# Patient Record
Sex: FEMALE | Race: WHITE | NOT HISPANIC OR LATINO | Employment: UNEMPLOYED | URBAN - METROPOLITAN AREA
[De-identification: names, ages, dates, MRNs, and addresses within clinical notes are randomized per-mention and may not be internally consistent; named-entity substitution may affect disease eponyms.]

---

## 2017-03-09 ENCOUNTER — HISTORICAL (OUTPATIENT)
Dept: LAB | Facility: HOSPITAL | Age: 57
End: 2017-03-09

## 2017-07-07 ENCOUNTER — HISTORICAL (OUTPATIENT)
Dept: RADIOLOGY | Facility: HOSPITAL | Age: 57
End: 2017-07-07

## 2018-07-27 ENCOUNTER — HISTORICAL (OUTPATIENT)
Dept: LAB | Facility: HOSPITAL | Age: 58
End: 2018-07-27

## 2018-07-27 LAB
ALBUMIN SERPL-MCNC: 3.4 GM/DL (ref 3.4–5)
ALBUMIN/GLOB SERPL: 0.9 {RATIO}
ALP SERPL-CCNC: 101 UNIT/L (ref 38–126)
ALT SERPL-CCNC: 30 UNIT/L (ref 12–78)
AST SERPL-CCNC: 16 UNIT/L (ref 15–37)
BILIRUB SERPL-MCNC: 0.4 MG/DL (ref 0.2–1)
BILIRUBIN DIRECT+TOT PNL SERPL-MCNC: 0.1 MG/DL (ref 0–0.2)
BILIRUBIN DIRECT+TOT PNL SERPL-MCNC: 0.3 MG/DL (ref 0–0.8)
BUN SERPL-MCNC: 19 MG/DL (ref 7–18)
CALCIUM SERPL-MCNC: 8.5 MG/DL (ref 8.5–10.1)
CHLORIDE SERPL-SCNC: 110 MMOL/L (ref 98–107)
CHOLEST SERPL-MCNC: 151 MG/DL (ref 0–200)
CHOLEST/HDLC SERPL: 4 {RATIO} (ref 0–4)
CO2 SERPL-SCNC: 28 MMOL/L (ref 21–32)
CREAT SERPL-MCNC: 0.98 MG/DL (ref 0.55–1.02)
DEPRECATED CALCIDIOL+CALCIFEROL SERPL-MC: 19.63 NG/ML (ref 30–80)
GLOBULIN SER-MCNC: 3.8 GM/DL (ref 2.4–3.5)
GLUCOSE SERPL-MCNC: 98 MG/DL (ref 74–106)
HDLC SERPL-MCNC: 40 MG/DL (ref 35–60)
LDLC SERPL CALC-MCNC: 82 MG/DL (ref 50–140)
POTASSIUM SERPL-SCNC: 4.3 MMOL/L (ref 3.5–5.1)
PROT SERPL-MCNC: 7.2 GM/DL (ref 6.4–8.2)
SODIUM SERPL-SCNC: 146 MMOL/L (ref 136–145)
TRIGL SERPL-MCNC: 143 MG/DL (ref 30–150)
TSH SERPL-ACNC: 0.89 MIU/L (ref 0.36–3.74)
VLDLC SERPL CALC-MCNC: 29 MG/DL

## 2018-08-14 ENCOUNTER — HISTORICAL (OUTPATIENT)
Dept: RADIOLOGY | Facility: HOSPITAL | Age: 58
End: 2018-08-14

## 2018-08-22 ENCOUNTER — HISTORICAL (OUTPATIENT)
Dept: RADIOLOGY | Facility: HOSPITAL | Age: 58
End: 2018-08-22

## 2018-10-10 ENCOUNTER — HISTORICAL (OUTPATIENT)
Dept: LAB | Facility: HOSPITAL | Age: 58
End: 2018-10-10

## 2018-10-10 LAB — DEPRECATED CALCIDIOL+CALCIFEROL SERPL-MC: 36 NG/ML (ref 30–100)

## 2019-07-27 ENCOUNTER — HISTORICAL (OUTPATIENT)
Dept: LAB | Facility: HOSPITAL | Age: 59
End: 2019-07-27

## 2019-07-27 LAB
ALBUMIN SERPL-MCNC: 3.4 GM/DL (ref 3.4–5)
ALBUMIN/GLOB SERPL: 1 RATIO (ref 1.1–2)
ALP SERPL-CCNC: 84 UNIT/L (ref 46–116)
ALT SERPL-CCNC: 31 UNIT/L (ref 12–78)
AST SERPL-CCNC: 16 UNIT/L (ref 10–37)
BILIRUB SERPL-MCNC: 0.6 MG/DL (ref 0.2–1)
BILIRUBIN DIRECT+TOT PNL SERPL-MCNC: 0.14 MG/DL (ref 0–0.2)
BILIRUBIN DIRECT+TOT PNL SERPL-MCNC: 0.46 MG/DL
BUN SERPL-MCNC: 19 MG/DL (ref 7–18)
CALCIUM SERPL-MCNC: 8.7 MG/DL (ref 8.5–10.1)
CHLORIDE SERPL-SCNC: 107 MMOL/L (ref 98–107)
CHOLEST SERPL-MCNC: 153 MG/DL (ref 50–200)
CHOLEST/HDLC SERPL: 4 {RATIO} (ref 0–5)
CO2 SERPL-SCNC: 32.5 MMOL/L (ref 21–32)
CREAT SERPL-MCNC: 0.89 MG/DL (ref 0.55–1.02)
DEPRECATED CALCIDIOL+CALCIFEROL SERPL-MC: 52.46 NG/ML (ref 30–80)
GLOBULIN SER-MCNC: 3.5 GM/DL (ref 2.4–3.5)
GLUCOSE SERPL-MCNC: 103 MG/DL (ref 74–106)
HDLC SERPL-MCNC: 42 MG/DL (ref 35–60)
LDLC SERPL CALC-MCNC: 83 MG/DL (ref 50–140)
POTASSIUM SERPL-SCNC: 3.5 MMOL/L (ref 3.5–5.1)
PROT SERPL-MCNC: 6.9 GM/DL (ref 6.4–8.2)
SODIUM SERPL-SCNC: 144 MMOL/L (ref 136–145)
TRIGL SERPL-MCNC: 138 MG/DL (ref 30–150)
TSH SERPL-ACNC: 0.75 MIU/ML (ref 0.35–3.75)
VLDLC SERPL CALC-MCNC: 28 MG/DL

## 2019-12-09 LAB — CRC RECOMMENDATION EXT: NORMAL

## 2020-07-21 ENCOUNTER — HISTORICAL (OUTPATIENT)
Dept: RADIOLOGY | Facility: HOSPITAL | Age: 60
End: 2020-07-21

## 2020-07-21 LAB
ABS NEUT (OLG): 3
ALBUMIN SERPL-MCNC: 2.9 GM/DL (ref 3.4–4.8)
ALBUMIN/GLOB SERPL: 0.7 RATIO (ref 1.1–2)
ALP SERPL-CCNC: 64 UNIT/L (ref 40–150)
ALT SERPL-CCNC: 25 UNIT/L (ref 0–55)
AST SERPL-CCNC: 34 UNIT/L (ref 5–34)
BASOPHILS # BLD AUTO: 0 X10(3)/MCL
BASOPHILS NFR BLD AUTO: 0 %
BILIRUB SERPL-MCNC: 0.6 MG/DL
BILIRUBIN DIRECT+TOT PNL SERPL-MCNC: 0.3 MG/DL
BILIRUBIN DIRECT+TOT PNL SERPL-MCNC: 0.3 MG/DL (ref 0–0.5)
BUN SERPL-MCNC: 13 MG/DL (ref 9.8–20.1)
CALCIUM SERPL-MCNC: 8.7 MG/DL (ref 8.4–10.2)
CHLORIDE SERPL-SCNC: 104 MMOL/L (ref 98–107)
CO2 SERPL-SCNC: 27 MEQ/L (ref 23–31)
CREAT SERPL-MCNC: 0.81 MG/DL (ref 0.55–1.02)
EOSINOPHIL # BLD AUTO: 0 X10(3)/MCL
EOSINOPHIL NFR BLD AUTO: 0 %
ERYTHROCYTE [DISTWIDTH] IN BLOOD BY AUTOMATED COUNT: 17 %
FERRITIN SERPL-MCNC: 454.82 NG/ML (ref 4.63–204)
GLOBULIN SER-MCNC: 3.9 GM/DL (ref 2.4–3.5)
GLUCOSE SERPL-MCNC: 116 MG/DL (ref 82–115)
HCT VFR BLD AUTO: 39.3 % (ref 34–46)
HGB BLD-MCNC: 12 GM/DL (ref 11.3–15.4)
IMM GRANULOCYTES # BLD AUTO: 0.01 10*3/UL (ref 0–0.1)
IMM GRANULOCYTES NFR BLD AUTO: 0.2 % (ref 0–1)
LYMPHOCYTES # BLD AUTO: 0.77 X10(3)/MCL
LYMPHOCYTES NFR BLD AUTO: 18.4 %
MCH RBC QN AUTO: 26.5 PG (ref 27–33)
MCHC RBC AUTO-ENTMCNC: 30.5 GM/DL (ref 32–35)
MCV RBC AUTO: 86.8 FL (ref 81–97)
MONOCYTES # BLD AUTO: 0.41 X10(3)/MCL
MONOCYTES NFR BLD AUTO: 9.8 %
NEUTROPHILS # BLD AUTO: 3 X10(3)/MCL
NEUTROPHILS NFR BLD AUTO: 71.6 %
PLATELET # BLD AUTO: 199 X10(3)/MCL (ref 140–450)
PMV BLD AUTO: 11 FL
POTASSIUM SERPL-SCNC: 3.7 MMOL/L (ref 3.5–5.1)
PROT SERPL-MCNC: 6.8 GM/DL (ref 5.8–7.6)
RBC # BLD AUTO: 4.53 X10(6)/MCL (ref 3.9–5)
SODIUM SERPL-SCNC: 142 MMOL/L (ref 136–145)
WBC # SPEC AUTO: 4.19 X10(3)/MCL (ref 3.4–9.2)

## 2020-11-04 ENCOUNTER — HISTORICAL (OUTPATIENT)
Dept: LAB | Facility: HOSPITAL | Age: 60
End: 2020-11-04

## 2020-11-04 LAB
ALBUMIN SERPL-MCNC: 3.8 GM/DL (ref 3.4–4.8)
ALBUMIN/GLOB SERPL: 1.2 RATIO (ref 1.1–2)
ALP SERPL-CCNC: 85 UNIT/L (ref 40–150)
ALT SERPL-CCNC: 20 UNIT/L (ref 0–55)
AST SERPL-CCNC: 21 UNIT/L (ref 5–34)
BILIRUB SERPL-MCNC: 0.5 MG/DL
BILIRUBIN DIRECT+TOT PNL SERPL-MCNC: 0.2 MG/DL (ref 0–0.5)
BILIRUBIN DIRECT+TOT PNL SERPL-MCNC: 0.3 MG/DL
BUN SERPL-MCNC: 16 MG/DL (ref 9.8–20.1)
CALCIUM SERPL-MCNC: 9.2 MG/DL (ref 8.4–10.2)
CHLORIDE SERPL-SCNC: 103 MMOL/L (ref 98–107)
CHOLEST SERPL-MCNC: 137 MG/DL
CHOLEST/HDLC SERPL: 3 {RATIO} (ref 0–5)
CO2 SERPL-SCNC: 33 MEQ/L (ref 23–31)
CREAT SERPL-MCNC: 0.76 MG/DL (ref 0.55–1.02)
DEPRECATED CALCIDIOL+CALCIFEROL SERPL-MC: 54.7 NG/ML (ref 6.6–49.9)
GLOBULIN SER-MCNC: 3.1 GM/DL (ref 2.4–3.5)
GLUCOSE SERPL-MCNC: 118 MG/DL (ref 82–115)
HDLC SERPL-MCNC: 40 MG/DL (ref 35–60)
LDLC SERPL CALC-MCNC: 68 MG/DL (ref 50–140)
POTASSIUM SERPL-SCNC: 3.3 MMOL/L (ref 3.5–5.1)
PROT SERPL-MCNC: 6.9 GM/DL (ref 5.8–7.6)
SODIUM SERPL-SCNC: 147 MMOL/L (ref 136–145)
TRIGL SERPL-MCNC: 146 MG/DL (ref 37–140)
VLDLC SERPL CALC-MCNC: 29 MG/DL

## 2021-01-06 ENCOUNTER — HISTORICAL (OUTPATIENT)
Dept: LAB | Facility: HOSPITAL | Age: 61
End: 2021-01-06

## 2021-01-06 LAB
BUN SERPL-MCNC: 16 MG/DL (ref 9.8–20.1)
CALCIUM SERPL-MCNC: 9.4 MG/DL (ref 8.4–10.2)
CHLORIDE SERPL-SCNC: 107 MMOL/L (ref 98–107)
CO2 SERPL-SCNC: 30 MEQ/L (ref 23–31)
CREAT SERPL-MCNC: 0.83 MG/DL (ref 0.55–1.02)
CREAT/UREA NIT SERPL: 19
GLUCOSE SERPL-MCNC: 107 MG/DL (ref 82–115)
POTASSIUM SERPL-SCNC: 3.7 MMOL/L (ref 3.5–5.1)
SODIUM SERPL-SCNC: 146 MMOL/L (ref 136–145)

## 2021-04-09 ENCOUNTER — HISTORICAL (OUTPATIENT)
Dept: RADIOLOGY | Facility: HOSPITAL | Age: 61
End: 2021-04-09

## 2021-05-18 ENCOUNTER — HISTORICAL (OUTPATIENT)
Dept: ANESTHESIOLOGY | Facility: HOSPITAL | Age: 61
End: 2021-05-18

## 2021-06-10 ENCOUNTER — HISTORICAL (OUTPATIENT)
Dept: RADIOLOGY | Facility: HOSPITAL | Age: 61
End: 2021-06-10

## 2021-06-15 ENCOUNTER — HISTORICAL (OUTPATIENT)
Dept: ANESTHESIOLOGY | Facility: HOSPITAL | Age: 61
End: 2021-06-15

## 2021-06-29 ENCOUNTER — HISTORICAL (OUTPATIENT)
Dept: ANESTHESIOLOGY | Facility: HOSPITAL | Age: 61
End: 2021-06-29

## 2021-07-13 ENCOUNTER — HISTORICAL (OUTPATIENT)
Dept: ANESTHESIOLOGY | Facility: HOSPITAL | Age: 61
End: 2021-07-13

## 2021-11-01 ENCOUNTER — HISTORICAL (OUTPATIENT)
Dept: LAB | Facility: HOSPITAL | Age: 61
End: 2021-11-01

## 2021-11-01 LAB
ABS NEUT (OLG): 2.31
ALBUMIN SERPL-MCNC: 3.7 GM/DL (ref 3.4–4.8)
ALBUMIN/GLOB SERPL: 1.2 RATIO (ref 1.1–2)
ALP SERPL-CCNC: 76 UNIT/L (ref 40–150)
ALT SERPL-CCNC: 25 UNIT/L (ref 0–55)
AST SERPL-CCNC: 19 UNIT/L (ref 5–34)
BASOPHILS # BLD AUTO: 0.01 X10(3)/MCL
BASOPHILS NFR BLD AUTO: 0.2 %
BILIRUB SERPL-MCNC: 0.6 MG/DL
BILIRUBIN DIRECT+TOT PNL SERPL-MCNC: 0.3 MG/DL
BILIRUBIN DIRECT+TOT PNL SERPL-MCNC: 0.3 MG/DL (ref 0–0.5)
BUN SERPL-MCNC: 17 MG/DL (ref 9.8–20.1)
CALCIUM SERPL-MCNC: 9.5 MG/DL (ref 8.7–10.5)
CHLORIDE SERPL-SCNC: 104 MMOL/L (ref 98–107)
CHOLEST SERPL-MCNC: 120 MG/DL
CHOLEST/HDLC SERPL: 3 {RATIO} (ref 0–5)
CO2 SERPL-SCNC: 30 MEQ/L (ref 23–31)
CREAT SERPL-MCNC: 0.78 MG/DL (ref 0.55–1.02)
DEPRECATED CALCIDIOL+CALCIFEROL SERPL-MC: 74.4 NG/ML (ref 30–80)
EOSINOPHIL # BLD AUTO: 0.16 X10(3)/MCL
EOSINOPHIL NFR BLD AUTO: 3.8 %
ERYTHROCYTE [DISTWIDTH] IN BLOOD BY AUTOMATED COUNT: 16 %
GLOBULIN SER-MCNC: 3.2 GM/DL (ref 2.4–3.5)
GLUCOSE SERPL-MCNC: 106 MG/DL (ref 82–115)
HCT VFR BLD AUTO: 42.2 % (ref 34–46)
HDLC SERPL-MCNC: 35 MG/DL (ref 35–60)
HGB BLD-MCNC: 13 GM/DL (ref 11.3–15.4)
IMM GRANULOCYTES # BLD AUTO: 0.01 10*3/UL (ref 0–0.1)
IMM GRANULOCYTES NFR BLD AUTO: 0.2 % (ref 0–1)
LDLC SERPL CALC-MCNC: 58 MG/DL (ref 50–140)
LYMPHOCYTES # BLD AUTO: 1.1 X10(3)/MCL
LYMPHOCYTES NFR BLD AUTO: 26.4 %
MCH RBC QN AUTO: 28.6 PG (ref 27–33)
MCHC RBC AUTO-ENTMCNC: 30.8 GM/DL (ref 32–35)
MCV RBC AUTO: 93 FL (ref 81–97)
MONOCYTES # BLD AUTO: 0.57 X10(3)/MCL
MONOCYTES NFR BLD AUTO: 13.7 %
NEUTROPHILS # BLD AUTO: 2.31 X10(3)/MCL
NEUTROPHILS NFR BLD AUTO: 55.7 %
PLATELET # BLD AUTO: 228 X10(3)/MCL (ref 140–450)
PMV BLD AUTO: 11 FL
POTASSIUM SERPL-SCNC: 3.5 MMOL/L (ref 3.5–5.1)
PROT SERPL-MCNC: 6.9 GM/DL (ref 5.8–7.6)
RBC # BLD AUTO: 4.54 X10(6)/MCL (ref 3.9–5)
SODIUM SERPL-SCNC: 145 MMOL/L (ref 136–145)
TRIGL SERPL-MCNC: 135 MG/DL (ref 37–140)
TSH SERPL-ACNC: 0.66 UIU/ML (ref 0.35–4.94)
VLDLC SERPL CALC-MCNC: 27 MG/DL
WBC # SPEC AUTO: 4.16 X10(3)/MCL (ref 3.4–9.2)

## 2021-12-08 ENCOUNTER — HISTORICAL (OUTPATIENT)
Dept: RADIOLOGY | Facility: HOSPITAL | Age: 61
End: 2021-12-08

## 2022-02-25 ENCOUNTER — HISTORICAL (OUTPATIENT)
Dept: ANESTHESIOLOGY | Facility: HOSPITAL | Age: 62
End: 2022-02-25

## 2022-03-09 ENCOUNTER — HISTORICAL (OUTPATIENT)
Dept: ADMINISTRATIVE | Facility: HOSPITAL | Age: 62
End: 2022-03-09

## 2022-03-09 ENCOUNTER — HISTORICAL (OUTPATIENT)
Dept: RADIOLOGY | Facility: HOSPITAL | Age: 62
End: 2022-03-09

## 2022-04-27 LAB — CRC RECOMMENDATION EXT: NORMAL

## 2022-09-02 RX ORDER — FUROSEMIDE 40 MG/1
40 TABLET ORAL 2 TIMES DAILY
COMMUNITY
Start: 2021-05-12 | End: 2023-05-02

## 2022-09-02 RX ORDER — PREGABALIN 150 MG/1
150 CAPSULE ORAL 2 TIMES DAILY
COMMUNITY
Start: 2021-05-12

## 2022-09-02 RX ORDER — CETIRIZINE HYDROCHLORIDE 10 MG/1
10 TABLET ORAL NIGHTLY
COMMUNITY
Start: 2021-05-12

## 2022-09-02 RX ORDER — SIMVASTATIN 20 MG/1
20 TABLET, FILM COATED ORAL NIGHTLY
COMMUNITY
Start: 2021-05-12 | End: 2023-05-02

## 2022-09-02 RX ORDER — CYCLOBENZAPRINE HCL 10 MG
10 TABLET ORAL 3 TIMES DAILY PRN
COMMUNITY
Start: 2021-05-12 | End: 2023-05-03

## 2022-09-02 RX ORDER — PRAMIPEXOLE DIHYDROCHLORIDE 0.12 MG/1
0.12 TABLET ORAL NIGHTLY
COMMUNITY
Start: 2021-05-12 | End: 2023-05-02

## 2022-09-02 RX ORDER — LOSARTAN POTASSIUM AND HYDROCHLOROTHIAZIDE 12.5; 5 MG/1; MG/1
1 TABLET ORAL EVERY MORNING
COMMUNITY
Start: 2021-05-12 | End: 2023-03-06 | Stop reason: SDUPTHER

## 2022-09-02 RX ORDER — HYDROCODONE BITARTRATE AND ACETAMINOPHEN 5; 325 MG/1; MG/1
1 TABLET ORAL 2 TIMES DAILY PRN
COMMUNITY
Start: 2021-05-12 | End: 2022-11-22

## 2022-09-02 RX ORDER — DULOXETIN HYDROCHLORIDE 30 MG/1
60 CAPSULE, DELAYED RELEASE ORAL EVERY MORNING
COMMUNITY
Start: 2021-05-12 | End: 2023-07-10

## 2022-09-02 RX ORDER — POLYETHYLENE GLYCOL 3350 17 G/17G
17 POWDER, FOR SOLUTION ORAL NIGHTLY
COMMUNITY

## 2022-09-02 RX ORDER — POTASSIUM CHLORIDE 20 MEQ/1
20 TABLET, EXTENDED RELEASE ORAL 2 TIMES DAILY
COMMUNITY
Start: 2021-05-12 | End: 2023-04-04 | Stop reason: SDUPTHER

## 2022-09-07 ENCOUNTER — ANESTHESIA EVENT (OUTPATIENT)
Dept: SURGERY | Facility: HOSPITAL | Age: 62
End: 2022-09-07
Payer: MEDICARE

## 2022-09-07 NOTE — ANESTHESIA PREPROCEDURE EVALUATION
09/07/2022  Kimberly Agarwal is a 62 y.o., female.      Pre-op Assessment    I have reviewed the Patient Summary Reports.     I have reviewed the Nursing Notes. I have reviewed the NPO Status.   I have reviewed the Medications.     Review of Systems  Anesthesia Hx:  Denies Family Hx of Anesthesia complications.   Denies Personal Hx of Anesthesia complications.   Social:  Non-Smoker, No Alcohol Use    Hematology/Oncology:  Hematology Normal   Oncology Normal     EENT/Dental:EENT/Dental Normal   Cardiovascular:   Hypertension, well controlled    Pulmonary:   Sleep Apnea    Hepatic/GI:   GERD, well controlled    Musculoskeletal:   Arthritis     Neurological:   Neuromuscular Disease,    Endocrine:  Endocrine Normal    Dermatological:  Skin Normal    Psych:  Psychiatric Normal           Physical Exam  General: Cooperative, Alert and Oriented    Airway:  Mallampati: II   Mouth Opening: Normal  Tongue: Normal  Neck ROM: Normal ROM    Dental:  Intact        Anesthesia Plan  Type of Anesthesia, risks & benefits discussed:    Anesthesia Type: MAC  Intra-op Monitoring Plan: Standard ASA Monitors  Post Op Pain Control Plan: multimodal analgesia  Induction:  IV  Informed Consent: Informed consent signed with the Patient and all parties understand the risks and agree with anesthesia plan.  All questions answered. Patient consented to blood products? Yes  ASA Score: 3    Ready For Surgery From Anesthesia Perspective.     .

## 2022-09-09 ENCOUNTER — HOSPITAL ENCOUNTER (OUTPATIENT)
Facility: HOSPITAL | Age: 62
Discharge: HOME | End: 2022-09-09
Attending: ANESTHESIOLOGY | Admitting: ANESTHESIOLOGY
Payer: MEDICARE

## 2022-09-09 ENCOUNTER — ANESTHESIA (OUTPATIENT)
Dept: SURGERY | Facility: HOSPITAL | Age: 62
End: 2022-09-09
Payer: MEDICARE

## 2022-09-09 DIAGNOSIS — M47.816 LUMBAR SPONDYLOSIS: ICD-10-CM

## 2022-09-09 PROCEDURE — 25000003 PHARM REV CODE 250: Performed by: ANESTHESIOLOGY

## 2022-09-09 PROCEDURE — 64636 DESTROY L/S FACET JNT ADDL: CPT | Mod: 50 | Performed by: ANESTHESIOLOGY

## 2022-09-09 PROCEDURE — 64635 DESTROY LUMB/SAC FACET JNT: CPT | Mod: 50 | Performed by: ANESTHESIOLOGY

## 2022-09-09 PROCEDURE — 63600175 PHARM REV CODE 636 W HCPCS: Performed by: NURSE ANESTHETIST, CERTIFIED REGISTERED

## 2022-09-09 PROCEDURE — 63600175 PHARM REV CODE 636 W HCPCS: Performed by: ANESTHESIOLOGY

## 2022-09-09 PROCEDURE — 37000009 HC ANESTHESIA EA ADD 15 MINS: Performed by: ANESTHESIOLOGY

## 2022-09-09 PROCEDURE — 25000003 PHARM REV CODE 250

## 2022-09-09 PROCEDURE — 37000008 HC ANESTHESIA 1ST 15 MINUTES: Performed by: ANESTHESIOLOGY

## 2022-09-09 RX ORDER — FENTANYL CITRATE 50 UG/ML
INJECTION, SOLUTION INTRAMUSCULAR; INTRAVENOUS
Status: DISCONTINUED | OUTPATIENT
Start: 2022-09-09 | End: 2022-09-09

## 2022-09-09 RX ORDER — LIDOCAINE HYDROCHLORIDE 20 MG/ML
INJECTION, SOLUTION EPIDURAL; INFILTRATION; INTRACAUDAL; PERINEURAL
Status: DISCONTINUED | OUTPATIENT
Start: 2022-09-09 | End: 2022-09-09 | Stop reason: HOSPADM

## 2022-09-09 RX ORDER — MIDAZOLAM HYDROCHLORIDE 1 MG/ML
INJECTION INTRAMUSCULAR; INTRAVENOUS
Status: DISCONTINUED | OUTPATIENT
Start: 2022-09-09 | End: 2022-09-09

## 2022-09-09 RX ORDER — BUPIVACAINE HYDROCHLORIDE 2.5 MG/ML
INJECTION, SOLUTION EPIDURAL; INFILTRATION; INTRACAUDAL
Status: DISCONTINUED | OUTPATIENT
Start: 2022-09-09 | End: 2022-09-09 | Stop reason: HOSPADM

## 2022-09-09 RX ORDER — SODIUM CHLORIDE, SODIUM LACTATE, POTASSIUM CHLORIDE, CALCIUM CHLORIDE 600; 310; 30; 20 MG/100ML; MG/100ML; MG/100ML; MG/100ML
INJECTION, SOLUTION INTRAVENOUS CONTINUOUS
Status: ACTIVE | OUTPATIENT
Start: 2022-09-09

## 2022-09-09 RX ADMIN — FENTANYL CITRATE 100 MCG: 50 INJECTION, SOLUTION INTRAMUSCULAR; INTRAVENOUS at 11:09

## 2022-09-09 RX ADMIN — MIDAZOLAM HYDROCHLORIDE 2 MG: 1 INJECTION, SOLUTION INTRAMUSCULAR; INTRAVENOUS at 11:09

## 2022-09-09 RX ADMIN — SODIUM CHLORIDE, POTASSIUM CHLORIDE, SODIUM LACTATE AND CALCIUM CHLORIDE: 600; 310; 30; 20 INJECTION, SOLUTION INTRAVENOUS at 09:09

## 2022-09-09 NOTE — ANESTHESIA POSTPROCEDURE EVALUATION
Anesthesia Post Evaluation    Patient: Kimberly Agarwal    Procedure(s) Performed: Procedure(s) (LRB):  RADIOFREQUENCY ABLATION, NERVE, SPINAL, LUMBAR, MEDIAL BRANCH, 1 LEVEL / Bilateral L3-5 RFA (Bilateral)    Final Anesthesia Type: MAC      Patient location during evaluation: OPS  Patient participation: Yes- Able to Participate  Level of consciousness: awake and alert  Post-procedure vital signs: reviewed and stable  Pain management: adequate  Airway patency: patent  NIKO mitigation strategies: Multimodal analgesia  PONV status at discharge: No PONV  Anesthetic complications: no      Cardiovascular status: hemodynamically stable  Respiratory status: unassisted, spontaneous ventilation and room air  Hydration status: euvolemic  Follow-up not needed.  Comments: Patient to bed per self          Vitals Value Taken Time   /84 09/09/22 0926   Temp  09/09/22 1126   Pulse 71 09/09/22 0926   Resp  09/09/22 1126   SpO2 97 % 09/09/22 0926         No case tracking events are documented in the log.      Pain/Raimundo Score: No data recorded

## 2022-09-09 NOTE — DISCHARGE SUMMARY
[facility] - Periop Services  Discharge Note  Short Stay    Procedure(s) (LRB):  RADIOFREQUENCY ABLATION, NERVE, SPINAL, LUMBAR, MEDIAL BRANCH, 1 LEVEL / Bilateral L3-5 RFA (Bilateral)    OUTCOME: Patient tolerated treatment/procedure well without complication and is now ready for discharge.    DISPOSITION: Home or Self Care    FINAL DIAGNOSIS:  Lumbar spondylosis    FOLLOWUP: In clinic    DISCHARGE INSTRUCTIONS:  No discharge procedures on file.      Clinical Reference Documents Added to Patient Instructions         Document    RADIOFREQUENCY ABLATION FOR PAIN (ENGLISH)            TIME SPENT ON DISCHARGE: 3 minutes

## 2022-09-12 VITALS
HEIGHT: 62 IN | HEART RATE: 69 BPM | BODY MASS INDEX: 53.92 KG/M2 | WEIGHT: 293 LBS | SYSTOLIC BLOOD PRESSURE: 162 MMHG | DIASTOLIC BLOOD PRESSURE: 73 MMHG | OXYGEN SATURATION: 97 %

## 2022-09-14 NOTE — PROCEDURES
Patient:   Kimberly Agarwal            MRN: [id number]            FIN: [id number]               Age:   61 years     Sex:  Female     :  1960   Associated Diagnoses:   None   Author:   Racquel VALVERDE MD, Jake      Type of Procedure: Lumbar Medial Branch Block and Conventional Radiofrequency Ablation    Physician: Phillip Clement III, MD    Diagnosis: Lumbar spondylosis without myelopathy    Description of Procedure:  After appropriate informed consent discussing the risks, benefits and possible complications, the patient was taken to the procedure suite. NIBP, pulse rate, and oxygen saturation were monitored throughout the procedure.     Patient was placed in the prone position with the midriff elevated. The skin was prepped and draped in a sterile fashion. AP and oblique views of the spine were obtained with fluoroscopy. Entry sites were marked over the skin and 2cc of Lidocaine 1% was used to anesthetize the skin and subcutaneous tissues at each entry site. A 18-gauge 10cm curved tip, insulated, RF needle was introduced at an angle to parallel the medial branches in the groove between superior articular process and transverse process. Needles were placed at the junction of the superior articular process and the transverse process. Additionally, needles were placed at the junction of the S1 superior process and the sacral ala. Procedure performed at the levels indicated: Bilateral L3-5 MB    No sensory stimulation was performed. No motor stimulation was elicited at any level at 2V with a frequency of 2Hz. 1cc of 2% lidocaine was injected at each level. Thermal RF was then conducted at each level at 80 degrees for 1:30 minutes. 1 cc of 0.25% bupivicaine was injected at each level. The needles were re-styletted and removed.    The needle was re-styletted and removed. Adhesive dressing was applied over the site. Patient tolerated the procedure well without any apparent complications. The patient was  transferred back to the recovery area and then discharged home.

## 2022-10-22 ENCOUNTER — HOSPITAL ENCOUNTER (EMERGENCY)
Facility: HOSPITAL | Age: 62
Discharge: HOME | End: 2022-10-22
Attending: EMERGENCY MEDICINE
Payer: MEDICARE

## 2022-10-22 VITALS
WEIGHT: 293 LBS | BODY MASS INDEX: 53.92 KG/M2 | DIASTOLIC BLOOD PRESSURE: 93 MMHG | HEIGHT: 62 IN | RESPIRATION RATE: 16 BRPM | OXYGEN SATURATION: 96 % | HEART RATE: 84 BPM | TEMPERATURE: 98 F | SYSTOLIC BLOOD PRESSURE: 174 MMHG

## 2022-10-22 DIAGNOSIS — S81.811A LACERATION OF RIGHT LOWER EXTREMITY, INITIAL ENCOUNTER: Primary | ICD-10-CM
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
## 2022-10-22 PROCEDURE — 25000003 PHARM REV CODE 250: Performed by: EMERGENCY MEDICINE

## 2022-10-22 PROCEDURE — 96372 THER/PROPH/DIAG INJ SC/IM: CPT | Performed by: EMERGENCY MEDICINE

## 2022-10-22 PROCEDURE — 12002 RPR S/N/AX/GEN/TRNK2.6-7.5CM: CPT | Mod: RT

## 2022-10-22 PROCEDURE — 99284 EMERGENCY DEPT VISIT MOD MDM: CPT | Mod: 25

## 2022-10-22 PROCEDURE — 63600175 PHARM REV CODE 636 W HCPCS: Performed by: EMERGENCY MEDICINE

## 2022-10-22 PROCEDURE — 90471 IMMUNIZATION ADMIN: CPT | Performed by: EMERGENCY MEDICINE

## 2022-10-22 PROCEDURE — 90715 TDAP VACCINE 7 YRS/> IM: CPT | Performed by: EMERGENCY MEDICINE

## 2022-10-22 RX ORDER — CEFAZOLIN SODIUM 1 G/3ML
1 INJECTION, POWDER, FOR SOLUTION INTRAMUSCULAR; INTRAVENOUS
Status: COMPLETED | OUTPATIENT
Start: 2022-10-22 | End: 2022-10-22

## 2022-10-22 RX ORDER — LIDOCAINE HYDROCHLORIDE 10 MG/ML
10 INJECTION INFILTRATION; PERINEURAL
Status: COMPLETED | OUTPATIENT
Start: 2022-10-22 | End: 2022-10-22

## 2022-10-22 RX ORDER — CEPHALEXIN 500 MG/1
500 CAPSULE ORAL 4 TIMES DAILY
Qty: 28 CAPSULE | Refills: 0 | Status: SHIPPED | OUTPATIENT
Start: 2022-10-22 | End: 2022-10-29

## 2022-10-22 RX ADMIN — CEFAZOLIN 1 G: 330 INJECTION, POWDER, FOR SOLUTION INTRAMUSCULAR; INTRAVENOUS at 05:10

## 2022-10-22 RX ADMIN — TETANUS TOXOID, REDUCED DIPHTHERIA TOXOID AND ACELLULAR PERTUSSIS VACCINE, ADSORBED 0.5 ML: 5; 2.5; 8; 8; 2.5 SUSPENSION INTRAMUSCULAR at 05:10

## 2022-10-22 RX ADMIN — LIDOCAINE HYDROCHLORIDE 10 ML: 10 INJECTION, SOLUTION INFILTRATION; PERINEURAL at 05:10

## 2022-10-22 NOTE — ED NOTES
Pt to Ed with c/o lac to lower right leg after tripping over a PVC pipe. Pressure dressing applied PTA, pt able to able to ambulate to room with no assitance. Pulses intact.

## 2022-10-22 NOTE — ED PROVIDER NOTES
Encounter Date: 10/22/2022       History     Chief Complaint   Patient presents with    Laceration     Laceration to the right lower leg 20 min PTA.  States fell on a PVC pipe.       Patient is a 62-year-old female presenting with complaint of laceration to the right lower extremity.  Patient states she was working in [employer] when she slipped and fell and cut her right lower extremity on a piece of pipe that was sticking on the ground.  Pipe was plastic.  Patient's tetanus shot is not up-to-date.  Patient denies any allergies to antibiotics.  Patient denies head trauma.  No neck pain.  No syncopal type symptoms.    Review of patient's allergies indicates:   Allergen Reactions    Latex Rash    Adhesive tape-silicones Blisters     Past Medical History:   Diagnosis Date    GERD (gastroesophageal reflux disease)     High cholesterol     HTN (hypertension)     Other spondylosis with radiculopathy, lumbar region     RLS (restless legs syndrome)     Sleep apnea     Sleep disorder      Past Surgical History:   Procedure Laterality Date    APPENDECTOMY      BACK SURGERY      COLONOSCOPY      ESOPHAGOGASTRODUODENOSCOPY      OPEN REDUCTION AND INTERNAL FIXATION (ORIF) OF INJURY OF WRIST Right     RADIOFREQUENCY ABLATION OF LUMBAR MEDIAL BRANCH NERVE AT SINGLE LEVEL Bilateral 9/9/2022    Procedure: RADIOFREQUENCY ABLATION, NERVE, SPINAL, LUMBAR, MEDIAL BRANCH, 1 LEVEL / Bilateral L3-5 RFA;  Surgeon: Phillip Clement MD;  Location: [facility];  Service: Pain Management;  Laterality: Bilateral;    TONSILLECTOMY      TOTAL KNEE ARTHROPLASTY Left      Family History   Problem Relation Age of Onset    Hypertension Mother     Hyperlipidemia Mother     Stroke Mother     Hypertension Father     Diabetes Father     Hyperlipidemia Father      Social History     Tobacco Use    Smoking status: Never    Smokeless tobacco: Never   Substance Use Topics    Alcohol use: Not Currently    Drug use: Never     Review of Systems   Constitutional: Negative.     HENT: Negative.     Respiratory: Negative.     Cardiovascular: Negative.    Gastrointestinal: Negative.    Musculoskeletal:  Positive for myalgias. Negative for arthralgias, gait problem, joint swelling and neck pain.   Skin:  Positive for wound.   Neurological: Negative.    Psychiatric/Behavioral: Negative.       Physical Exam     Initial Vitals [10/22/22 1708]   BP Pulse Resp Temp SpO2   (!) 197/95 (!) 120 18 98.4 °F (36.9 °C) 95 %      MAP       --         Physical Exam    Nursing note and vitals reviewed.  Constitutional: She appears well-developed and well-nourished.   HENT:   Head: Normocephalic and atraumatic.   Eyes: Pupils are equal, round, and reactive to light.   Neck:   Normal range of motion.  Cardiovascular:  Normal rate, regular rhythm and normal heart sounds.           Pulmonary/Chest: Breath sounds normal.   Abdominal: Abdomen is soft. She exhibits no distension. There is no abdominal tenderness. There is no rebound.   Musculoskeletal:         General: Normal range of motion.      Cervical back: Normal range of motion.     Neurological: She is alert and oriented to person, place, and time. She has normal strength.   Skin:   Patient has an L-shaped laceration to the anterior aspect of the right mid tib-fib area.  There is mild bleeding from the area.  No foreign body is visible.  Laceration is approximately 5 cm in length.       ED Course   Lac Repair    Date/Time: 10/22/2022 5:41 PM  Performed by: Kory Bernard MD  Authorized by: Kory Bernard MD     Consent:     Consent obtained:  Verbal  Universal protocol:     Patient identity confirmed:  Verbally with patient  Anesthesia:     Anesthesia method:  Local infiltration    Local anesthetic:  Lidocaine 1% w/o epi  Laceration details:     Location:  Leg    Leg location:  R lower leg    Length (cm):  5  Pre-procedure details:     Preparation:  Patient was prepped and draped in usual sterile fashion and imaging obtained to evaluate for foreign  bodies  Exploration:     Hemostasis achieved with:  Direct pressure    Imaging obtained: x-ray      Imaging outcome: foreign body not noted    Treatment:     Area cleansed with:  Povidone-iodine    Amount of cleaning:  Extensive    Irrigation solution:  Sterile saline    Irrigation volume:  1 l    Irrigation method:  Pressure wash    Debridement:  Minimal  Skin repair:     Repair method:  Sutures    Suture size:  4-0    Suture material:  Nylon    Suture technique:  Simple interrupted    Number of sutures:  12  Approximation:     Approximation:  Close  Repair type:     Repair type:  Intermediate  Post-procedure details:     Dressing:  Non-adherent dressing  Labs Reviewed - No data to display       Imaging Results              X-Ray Tibia Fibula 2 View Right (Final result)  Result time 10/22/22 17:44:58      Final result by Catrachito Montanez MD (10/22/22 17:44:58)                   Impression:      No acute findings.      Electronically signed by: Catrachito Montanez  Date:    10/22/2022  Time:    17:44               Narrative:    EXAMINATION:  XR TIBIA FIBULA 2 VIEW RIGHT    CLINICAL HISTORY:  Unspecified fall, initial encounter    COMPARISON:  None    FINDINGS:  Two views of the right tibia and fibula.  There is no fracture or dislocation.  No radiopaque foreign body is seen.                                    X-Rays:   Independently Interpreted Readings:   Other Readings:  No foreign body seen on right tib-fib x-rays  Medications   LIDOcaine HCL 10 mg/ml (1%) injection 10 mL (10 mLs Infiltration Given 10/22/22 1715)   Tdap (BOOSTRIX) vaccine injection 0.5 mL (0.5 mLs Intramuscular Given 10/22/22 1734)   ceFAZolin injection 1 g (1 g Intramuscular Given 10/22/22 1748)                              Clinical Impression:   Final diagnoses:  [W19.XXXA] Fall  [S81.811A] Laceration of right lower extremity, initial encounter (Primary)        ED Disposition Condition    Discharge Stable          ED Prescriptions       Medication Sig  Dispense Start Date End Date Auth. Provider    cephALEXin (KEFLEX) 500 MG capsule Take 1 capsule (500 mg total) by mouth 4 (four) times daily. for 7 days 28 capsule 10/22/2022 10/29/2022 Kory Bernard MD          Follow-up Information       Follow up With Specialties Details Why Contact Info    [facility] - Emergency Dept Emergency Medicine  As needed, If symptoms worsen [address] [place] [address] [id number] [phone]    [facility] - Emergency Dept Emergency Medicine  As needed, If symptoms worsen [address] [place] [address] [id number] [phone]             Kory Bernard MD  10/22/22 1746       Kory Bernard MD  10/22/22 1801       Kory Bernard MD  10/22/22 1801       Kory Bernard MD  10/22/22 1806

## 2022-10-27 ENCOUNTER — OFFICE VISIT (OUTPATIENT)
Dept: FAMILY MEDICINE | Facility: CLINIC | Age: 62
End: 2022-10-27
Payer: MEDICARE

## 2022-10-27 VITALS
OXYGEN SATURATION: 98 % | TEMPERATURE: 98 F | HEIGHT: 63 IN | RESPIRATION RATE: 20 BRPM | BODY MASS INDEX: 51.91 KG/M2 | HEART RATE: 87 BPM | WEIGHT: 293 LBS | DIASTOLIC BLOOD PRESSURE: 76 MMHG | SYSTOLIC BLOOD PRESSURE: 121 MMHG

## 2022-10-27 DIAGNOSIS — Z76.89 ESTABLISHING CARE WITH NEW DOCTOR, ENCOUNTER FOR: Primary | ICD-10-CM

## 2022-10-27 DIAGNOSIS — Z79.899 OTHER LONG TERM (CURRENT) DRUG THERAPY: ICD-10-CM

## 2022-10-27 DIAGNOSIS — E78.5 HYPERLIPIDEMIA, UNSPECIFIED HYPERLIPIDEMIA TYPE: ICD-10-CM

## 2022-10-27 DIAGNOSIS — S81.801A WOUND OF RIGHT LOWER EXTREMITY, INITIAL ENCOUNTER: ICD-10-CM

## 2022-10-27 DIAGNOSIS — Z00.00 WELLNESS EXAMINATION: ICD-10-CM

## 2022-10-27 DIAGNOSIS — I10 HYPERTENSION, UNSPECIFIED TYPE: ICD-10-CM

## 2022-10-27 DIAGNOSIS — E11.628 TYPE 2 DIABETES MELLITUS WITH OTHER SKIN COMPLICATION, WITHOUT LONG-TERM CURRENT USE OF INSULIN: ICD-10-CM

## 2022-10-27 DIAGNOSIS — R39.15 URINARY URGENCY: ICD-10-CM

## 2022-10-27 PROCEDURE — 3074F SYST BP LT 130 MM HG: CPT | Mod: CPTII,,, | Performed by: REGISTERED NURSE

## 2022-10-27 PROCEDURE — 99204 OFFICE O/P NEW MOD 45 MIN: CPT | Mod: ,,, | Performed by: REGISTERED NURSE

## 2022-10-27 PROCEDURE — 1159F PR MEDICATION LIST DOCUMENTED IN MEDICAL RECORD: ICD-10-PCS | Mod: CPTII,,, | Performed by: REGISTERED NURSE

## 2022-10-27 PROCEDURE — 3078F DIAST BP <80 MM HG: CPT | Mod: CPTII,,, | Performed by: REGISTERED NURSE

## 2022-10-27 PROCEDURE — 3078F PR MOST RECENT DIASTOLIC BLOOD PRESSURE < 80 MM HG: ICD-10-PCS | Mod: CPTII,,, | Performed by: REGISTERED NURSE

## 2022-10-27 PROCEDURE — 99204 PR OFFICE/OUTPT VISIT, NEW, LEVL IV, 45-59 MIN: ICD-10-PCS | Mod: ,,, | Performed by: REGISTERED NURSE

## 2022-10-27 PROCEDURE — 3074F PR MOST RECENT SYSTOLIC BLOOD PRESSURE < 130 MM HG: ICD-10-PCS | Mod: CPTII,,, | Performed by: REGISTERED NURSE

## 2022-10-27 PROCEDURE — 1159F MED LIST DOCD IN RCRD: CPT | Mod: CPTII,,, | Performed by: REGISTERED NURSE

## 2022-10-27 RX ORDER — HYDROGEN PEROXIDE 3 %
20 SOLUTION, NON-ORAL MISCELLANEOUS
COMMUNITY
End: 2023-07-03

## 2022-10-27 RX ORDER — PERPHENAZINE/AMITRIPTYLINE HCL 4 MG-25 MG
1 TABLET ORAL EVERY MORNING
COMMUNITY

## 2022-10-27 RX ORDER — METFORMIN HYDROCHLORIDE 500 MG/1
500 TABLET ORAL 2 TIMES DAILY WITH MEALS
Qty: 180 TABLET | Refills: 3 | Status: SHIPPED | OUTPATIENT
Start: 2022-10-27 | End: 2023-09-19 | Stop reason: SDUPTHER

## 2022-10-27 NOTE — PROGRESS NOTES
Subjective:       Patient ID: Kimberly Agarwal is a 62 y.o. female.    Chief Complaint: Establish Care and Referral (Needs endocrinology referral)    Patient is a 62-year-old female here today to establish care and for medication refills.  Patient has past medical history of hypertension, urinary urgency, hyperlipidemia, type 2 diabetes, and chronic back pain.  Patient presents with wound to right lower leg that occurred 1 week ago, patient stated that she tripped while walking.  Sutures in place, erythema noted.  Review of Systems   Constitutional: Negative.  Negative for chills, fatigue and fever.   HENT: Negative.     Eyes: Negative.    Respiratory: Negative.     Cardiovascular: Negative.  Negative for chest pain and palpitations.   Gastrointestinal: Negative.    Endocrine: Negative.    Genitourinary:  Positive for urgency. Negative for bladder incontinence, flank pain and hematuria.   Musculoskeletal:  Positive for back pain.   Integumentary:  Positive for wound.   Allergic/Immunologic: Negative.    Neurological: Negative.    Hematological: Negative.    Psychiatric/Behavioral: Negative.         Objective:      Physical Exam  Constitutional:       Appearance: Normal appearance. She is obese.   HENT:      Head: Normocephalic.      Right Ear: Tympanic membrane normal.      Left Ear: Tympanic membrane normal.      Nose: Nose normal.      Mouth/Throat:      Mouth: Mucous membranes are moist.   Eyes:      Extraocular Movements: Extraocular movements intact.      Conjunctiva/sclera: Conjunctivae normal.      Pupils: Pupils are equal, round, and reactive to light.   Cardiovascular:      Rate and Rhythm: Normal rate and regular rhythm.      Pulses: Normal pulses.      Heart sounds: Normal heart sounds.   Pulmonary:      Effort: Pulmonary effort is normal.      Breath sounds: Normal breath sounds.   Abdominal:      General: Abdomen is flat. Bowel sounds are normal.      Palpations: Abdomen is soft.   Musculoskeletal:          General: Tenderness present.      Cervical back: Normal range of motion and neck supple.      Lumbar back: Tenderness present.   Skin:     General: Skin is warm.      Capillary Refill: Capillary refill takes less than 2 seconds.      Findings: Erythema and laceration present.             Comments: Laceration with sutures noted to R lower extremity, erythema noted, no drainage. Pt instructed to keep area clean and dry, continue Keflex as previously prescribed.    Neurological:      General: No focal deficit present.      Mental Status: She is alert and oriented to person, place, and time.   Psychiatric:         Mood and Affect: Mood normal.         Behavior: Behavior normal.         Thought Content: Thought content normal.         Judgment: Judgment normal.       Assessment:       Problem List Items Addressed This Visit          Cardiac/Vascular    Hypertension    Hyperlipidemia       Renal/    Urinary urgency       Orthopedic    Wound of right leg       Other    Body mass index (BMI) 50.0-59.9, adult    Relevant Orders    CBC Auto Differential    Vitamin D     Other Visit Diagnoses       Establishing care with new doctor, encounter for    -  Primary    Wellness examination        Relevant Orders    CBC Auto Differential    Comprehensive Metabolic Panel    Vitamin D    Lipid Panel    Hepatitis C Antibody    HIV 1/2 Ag/Ab (4th Gen)    Hemoglobin A1C    Other long term (current) drug therapy        Relevant Orders    Hemoglobin A1C    Type 2 diabetes mellitus with other skin complication, without long-term current use of insulin        Relevant Medications    metFORMIN (GLUCOPHAGE) 500 MG tablet        I spent a total of 45 minutes on the day of the visit.This includes face to face time and non-face to face time preparing to see the patient (eg, review of tests), obtaining and/or reviewing separately obtained history, documenting clinical information in the electronic or other health record, independently  interpreting results and communicating results to the patient/family/caregiver, or care coordinator.      Plan:   Wellness examination-labs ordered today, healthy lifestyle discussed with patient.  Hyperlipidemia-low-cholesterol low-fat diet discussed with patient, continue medication regimen, lipid panel ordered today  Hypertension-low-sodium diet discussed with patient, continue current medication regimen.  Blood pressure within normal limits  Type 2 diabetes-hemoglobin A1c ordered today, metformin sent to pharmacy on file  Urinary urgency-patient doing well on Myrbetric  Right leg wound-patient to return to clinic in 1-2 weeks for wound recheck, finish previously prescribed antibiotic    Return to clinic in 1-2 weeks for lab review and wound recheck

## 2022-10-31 ENCOUNTER — LAB VISIT (OUTPATIENT)
Dept: LAB | Facility: HOSPITAL | Age: 62
End: 2022-10-31
Attending: FAMILY MEDICINE
Payer: MEDICARE

## 2022-10-31 DIAGNOSIS — Z00.00 WELLNESS EXAMINATION: ICD-10-CM

## 2022-10-31 DIAGNOSIS — Z79.899 OTHER LONG TERM (CURRENT) DRUG THERAPY: ICD-10-CM

## 2022-10-31 LAB
ALBUMIN SERPL-MCNC: 3.5 GM/DL (ref 3.4–4.8)
ALBUMIN/GLOB SERPL: 1.2 RATIO (ref 1.1–2)
ALP SERPL-CCNC: 86 UNIT/L (ref 40–150)
ALT SERPL-CCNC: 14 UNIT/L (ref 0–55)
AST SERPL-CCNC: 13 UNIT/L (ref 5–34)
BASOPHILS # BLD AUTO: 0.04 X10(3)/MCL (ref 0–0.2)
BASOPHILS NFR BLD AUTO: 0.7 %
BILIRUBIN DIRECT+TOT PNL SERPL-MCNC: 0.4 MG/DL
BUN SERPL-MCNC: 18 MG/DL (ref 9.8–20.1)
CALCIUM SERPL-MCNC: 9.1 MG/DL (ref 8.4–10.2)
CHLORIDE SERPL-SCNC: 111 MMOL/L (ref 98–107)
CHOLEST SERPL-MCNC: 179 MG/DL
CHOLEST/HDLC SERPL: 4 {RATIO} (ref 0–5)
CO2 SERPL-SCNC: 29 MMOL/L (ref 23–31)
CREAT SERPL-MCNC: 0.8 MG/DL (ref 0.55–1.02)
DEPRECATED CALCIDIOL+CALCIFEROL SERPL-MC: 46.9 NG/ML (ref 30–80)
EOSINOPHIL # BLD AUTO: 0.18 X10(3)/MCL (ref 0–0.9)
EOSINOPHIL NFR BLD AUTO: 3.3 %
ERYTHROCYTE [DISTWIDTH] IN BLOOD BY AUTOMATED COUNT: 14.3 % (ref 11.5–17)
EST. AVERAGE GLUCOSE BLD GHB EST-MCNC: 116.9 MG/DL
GFR SERPLBLD CREATININE-BSD FMLA CKD-EPI: >60 MLS/MIN/1.73/M2
GLOBULIN SER-MCNC: 3 GM/DL (ref 2.4–3.5)
GLUCOSE SERPL-MCNC: 105 MG/DL (ref 82–115)
HBA1C MFR BLD: 5.7 %
HCT VFR BLD AUTO: 38.8 % (ref 37–47)
HCV AB SERPL QL IA: NONREACTIVE
HDLC SERPL-MCNC: 43 MG/DL (ref 35–60)
HGB BLD-MCNC: 12.3 GM/DL (ref 12–16)
HIV 1+2 AB+HIV1 P24 AG SERPL QL IA: NONREACTIVE
IMM GRANULOCYTES # BLD AUTO: 0.04 X10(3)/MCL (ref 0–0.04)
IMM GRANULOCYTES NFR BLD AUTO: 0.7 %
LDLC SERPL CALC-MCNC: 92 MG/DL (ref 50–140)
LYMPHOCYTES # BLD AUTO: 1.33 X10(3)/MCL (ref 0.6–4.6)
LYMPHOCYTES NFR BLD AUTO: 24.2 %
MCH RBC QN AUTO: 29.2 PG (ref 27–31)
MCHC RBC AUTO-ENTMCNC: 31.7 MG/DL (ref 33–36)
MCV RBC AUTO: 92.2 FL (ref 80–94)
MONOCYTES # BLD AUTO: 0.57 X10(3)/MCL (ref 0.1–1.3)
MONOCYTES NFR BLD AUTO: 10.4 %
NEUTROPHILS # BLD AUTO: 3.3 X10(3)/MCL (ref 2.1–9.2)
NEUTROPHILS NFR BLD AUTO: 60.7 %
NRBC BLD AUTO-RTO: 0 %
PLATELET # BLD AUTO: 195 X10(3)/MCL (ref 130–400)
PMV BLD AUTO: 11.1 FL (ref 7.4–10.4)
POTASSIUM SERPL-SCNC: 4.4 MMOL/L (ref 3.5–5.1)
PROT SERPL-MCNC: 6.5 GM/DL (ref 5.8–7.6)
RBC # BLD AUTO: 4.21 X10(6)/MCL (ref 4.2–5.4)
SODIUM SERPL-SCNC: 147 MMOL/L (ref 136–145)
TRIGL SERPL-MCNC: 220 MG/DL (ref 37–140)
VLDLC SERPL CALC-MCNC: 44 MG/DL
WBC # SPEC AUTO: 5.5 X10(3)/MCL (ref 4.5–11.5)

## 2022-10-31 PROCEDURE — 85025 COMPLETE CBC W/AUTO DIFF WBC: CPT

## 2022-10-31 PROCEDURE — 36415 COLL VENOUS BLD VENIPUNCTURE: CPT

## 2022-10-31 PROCEDURE — 87389 HIV-1 AG W/HIV-1&-2 AB AG IA: CPT

## 2022-10-31 PROCEDURE — 86803 HEPATITIS C AB TEST: CPT

## 2022-10-31 PROCEDURE — 80061 LIPID PANEL: CPT

## 2022-10-31 PROCEDURE — 83036 HEMOGLOBIN GLYCOSYLATED A1C: CPT

## 2022-10-31 PROCEDURE — 82306 VITAMIN D 25 HYDROXY: CPT

## 2022-10-31 PROCEDURE — 80053 COMPREHEN METABOLIC PANEL: CPT

## 2022-11-01 ENCOUNTER — TELEPHONE (OUTPATIENT)
Dept: FAMILY MEDICINE | Facility: CLINIC | Age: 62
End: 2022-11-01
Payer: MEDICARE

## 2022-11-01 DIAGNOSIS — L23.7 CONTACT DERMATITIS DUE TO POISON IVY: Primary | ICD-10-CM

## 2022-11-01 RX ORDER — METHYLPREDNISOLONE 4 MG/1
TABLET ORAL
Qty: 21 EACH | Refills: 0 | Status: SHIPPED | OUTPATIENT
Start: 2022-11-01 | End: 2022-11-22

## 2022-11-01 NOTE — TELEPHONE ENCOUNTER
Patient call clinic this morning and stated that she was outside and rubbed against poison oak 2 days ago, patient complaining of erythema and pruritus to lower leg.  Medrol Dosepak sent to pharmacy on file with instructions.  Patient to return to clinic on Thursday for wound check.

## 2022-11-03 ENCOUNTER — OFFICE VISIT (OUTPATIENT)
Dept: FAMILY MEDICINE | Facility: CLINIC | Age: 62
End: 2022-11-03
Payer: MEDICARE

## 2022-11-03 VITALS
HEIGHT: 63 IN | SYSTOLIC BLOOD PRESSURE: 162 MMHG | HEART RATE: 110 BPM | RESPIRATION RATE: 18 BRPM | TEMPERATURE: 98 F | BODY MASS INDEX: 51.91 KG/M2 | OXYGEN SATURATION: 98 % | DIASTOLIC BLOOD PRESSURE: 88 MMHG | WEIGHT: 293 LBS

## 2022-11-03 DIAGNOSIS — Z48.02 VISIT FOR SUTURE REMOVAL: ICD-10-CM

## 2022-11-03 DIAGNOSIS — L08.9 SKIN INFECTION, BACTERIAL: Primary | ICD-10-CM

## 2022-11-03 DIAGNOSIS — E78.1 HYPERTRIGLYCERIDEMIA: ICD-10-CM

## 2022-11-03 DIAGNOSIS — Z00.00 WELLNESS EXAMINATION: ICD-10-CM

## 2022-11-03 DIAGNOSIS — B96.89 SKIN INFECTION, BACTERIAL: Primary | ICD-10-CM

## 2022-11-03 DIAGNOSIS — L23.7 CONTACT DERMATITIS DUE TO POISON IVY: ICD-10-CM

## 2022-11-03 DIAGNOSIS — L29.9 PRURITUS: ICD-10-CM

## 2022-11-03 PROCEDURE — 3008F PR BODY MASS INDEX (BMI) DOCUMENTED: ICD-10-PCS | Mod: CPTII,,, | Performed by: REGISTERED NURSE

## 2022-11-03 PROCEDURE — 3077F SYST BP >= 140 MM HG: CPT | Mod: CPTII,,, | Performed by: REGISTERED NURSE

## 2022-11-03 PROCEDURE — 1159F MED LIST DOCD IN RCRD: CPT | Mod: CPTII,,, | Performed by: REGISTERED NURSE

## 2022-11-03 PROCEDURE — 1159F PR MEDICATION LIST DOCUMENTED IN MEDICAL RECORD: ICD-10-PCS | Mod: CPTII,,, | Performed by: REGISTERED NURSE

## 2022-11-03 PROCEDURE — 3079F DIAST BP 80-89 MM HG: CPT | Mod: CPTII,,, | Performed by: REGISTERED NURSE

## 2022-11-03 PROCEDURE — 99214 OFFICE O/P EST MOD 30 MIN: CPT | Mod: ,,, | Performed by: REGISTERED NURSE

## 2022-11-03 PROCEDURE — 99214 PR OFFICE/OUTPT VISIT, EST, LEVL IV, 30-39 MIN: ICD-10-PCS | Mod: ,,, | Performed by: REGISTERED NURSE

## 2022-11-03 PROCEDURE — 3077F PR MOST RECENT SYSTOLIC BLOOD PRESSURE >= 140 MM HG: ICD-10-PCS | Mod: CPTII,,, | Performed by: REGISTERED NURSE

## 2022-11-03 PROCEDURE — 3079F PR MOST RECENT DIASTOLIC BLOOD PRESSURE 80-89 MM HG: ICD-10-PCS | Mod: CPTII,,, | Performed by: REGISTERED NURSE

## 2022-11-03 PROCEDURE — 3008F BODY MASS INDEX DOCD: CPT | Mod: CPTII,,, | Performed by: REGISTERED NURSE

## 2022-11-03 RX ORDER — HYDROXYZINE PAMOATE 25 MG/1
25 CAPSULE ORAL EVERY 8 HOURS PRN
Qty: 30 CAPSULE | Refills: 0 | Status: SHIPPED | OUTPATIENT
Start: 2022-11-03 | End: 2022-11-13

## 2022-11-03 RX ORDER — MUPIROCIN 20 MG/G
OINTMENT TOPICAL 3 TIMES DAILY
Qty: 2 G | Refills: 0 | Status: SHIPPED | OUTPATIENT
Start: 2022-11-03 | End: 2022-11-10

## 2022-11-03 RX ORDER — CEPHALEXIN 500 MG/1
500 CAPSULE ORAL EVERY 6 HOURS
Qty: 28 CAPSULE | Refills: 0 | Status: SHIPPED | OUTPATIENT
Start: 2022-11-03 | End: 2022-11-10

## 2022-11-04 ENCOUNTER — TELEPHONE (OUTPATIENT)
Dept: FAMILY MEDICINE | Facility: CLINIC | Age: 62
End: 2022-11-04
Payer: MEDICARE

## 2022-11-04 PROBLEM — B96.89 SKIN INFECTION, BACTERIAL: Status: ACTIVE | Noted: 2022-11-04

## 2022-11-04 PROBLEM — L23.7 CONTACT DERMATITIS DUE TO POISON IVY: Status: ACTIVE | Noted: 2022-11-04

## 2022-11-04 PROBLEM — Z48.02 VISIT FOR SUTURE REMOVAL: Status: ACTIVE | Noted: 2022-11-04

## 2022-11-04 PROBLEM — Z00.00 WELLNESS EXAMINATION: Status: ACTIVE | Noted: 2022-11-04

## 2022-11-04 PROBLEM — L08.9 SKIN INFECTION, BACTERIAL: Status: ACTIVE | Noted: 2022-11-04

## 2022-11-04 PROBLEM — L29.9 ITCHING: Status: ACTIVE | Noted: 2022-11-04

## 2022-11-04 RX ORDER — TRIAMCINOLONE ACETONIDE 1 MG/G
CREAM TOPICAL 2 TIMES DAILY
Qty: 80 G | Refills: 0 | Status: SHIPPED | OUTPATIENT
Start: 2022-11-04 | End: 2023-07-03

## 2022-11-04 NOTE — TELEPHONE ENCOUNTER
----- Message from RAFAELA Tinajero sent at 11/4/2022  9:33 AM CDT -----  Regarding: supplements  I spoke with the patient earlier regarding her triglyceride level and forgot to tell her to take the following supplement  Fish oil and COQ10, please call patient and advise to begin taking these 2 supplements she can not find them over-the-counter

## 2022-11-04 NOTE — TELEPHONE ENCOUNTER
Called pt with no answer. Left vm advising pt to start Fish Oil and COQ10 Supplements that can be bought over the counter. Advised a call back  if any further questions.

## 2022-11-04 NOTE — PROGRESS NOTES
Subjective:       Patient ID: Kimberly Agarwal is a 62 y.o. female.    Chief Complaint: Follow-up (Wound check)    Patient is a 62-year-old female here today for wound recheck and wellness.  Patient complaining of pruritic rash to upper left arm.  Review of Systems   Constitutional: Negative.  Negative for chills, fatigue and fever.   HENT: Negative.     Eyes: Negative.    Respiratory: Negative.     Cardiovascular: Negative.    Gastrointestinal: Negative.    Endocrine: Negative.    Genitourinary: Negative.    Musculoskeletal: Negative.    Integumentary:  Positive for rash and wound.   Allergic/Immunologic: Negative.    Neurological: Negative.    Hematological: Negative.    Psychiatric/Behavioral: Negative.         Objective:      Physical Exam  Constitutional:       Appearance: Normal appearance. She is obese.   HENT:      Head: Normocephalic.      Right Ear: Tympanic membrane normal.      Left Ear: Tympanic membrane normal.      Nose: Nose normal.      Mouth/Throat:      Mouth: Mucous membranes are moist.   Eyes:      Extraocular Movements: Extraocular movements intact.      Conjunctiva/sclera: Conjunctivae normal.      Pupils: Pupils are equal, round, and reactive to light.   Cardiovascular:      Rate and Rhythm: Normal rate and regular rhythm.      Pulses: Normal pulses.      Heart sounds: Normal heart sounds.   Pulmonary:      Effort: Pulmonary effort is normal.      Breath sounds: Normal breath sounds.   Abdominal:      General: Abdomen is flat. Bowel sounds are normal.      Palpations: Abdomen is soft.   Musculoskeletal:         General: Normal range of motion.      Cervical back: Normal range of motion and neck supple.   Skin:     General: Skin is warm.      Capillary Refill: Capillary refill takes less than 2 seconds.      Findings: Erythema, rash and wound present. Rash is purpuric.             Comments: Erythemic annular  rash noted to upper arm, pt stated that she had contact with poison oak    Sutures  removed to right lower leg laceration, erythema and edema noted to area         Neurological:      General: No focal deficit present.      Mental Status: She is alert and oriented to person, place, and time.   Psychiatric:         Mood and Affect: Mood normal.         Behavior: Behavior normal.         Thought Content: Thought content normal.         Judgment: Judgment normal.       Assessment:       Problem List Items Addressed This Visit          Derm    Itching    Relevant Medications    hydrOXYzine pamoate (VISTARIL) 25 MG Cap    triamcinolone acetonide 0.1% (KENALOG) 0.1 % cream       ID    Skin infection, bacterial - Primary    Relevant Medications    mupirocin (BACTROBAN) 2 % ointment    cephALEXin (KEFLEX) 500 MG capsule       Orthopedic    Visit for suture removal       Other    Contact dermatitis due to poison ivy    Relevant Medications    triamcinolone acetonide 0.1% (KENALOG) 0.1 % cream    Wellness examination   I spent a total of 30 minutes on the day of the visit.This includes face to face time and non-face to face time preparing to see the patient (eg, review of tests), obtaining and/or reviewing separately obtained history, documenting clinical information in the electronic or other health record, independently interpreting results and communicating results to the patient/family/caregiver, or care coordinator.    Plan:   Wellness examination-healthy lifestyle discussed with patient, labs reviewed today, patient instructed to decrease fat and cholesterol in diet, increase exercise as tolerated, lipid panel to be drawn in 3 months  Skin infection-Bactroban and Keflex sent to pharmacy on file use as directed, patient instructed to report increase in pain, redness, or edema immediately to office, patient verbalized understanding.  Pruritus-Vistaril 25 mg sent to pharmacy on file, use as directed.  Contact dermatitis/poison oak-Kenalog 0.1% cream sent to pharmacy on file, use as directed  Return to  clinic as needed

## 2022-11-22 ENCOUNTER — OFFICE VISIT (OUTPATIENT)
Dept: FAMILY MEDICINE | Facility: CLINIC | Age: 62
End: 2022-11-22
Payer: MEDICARE

## 2022-11-22 ENCOUNTER — HOSPITAL ENCOUNTER (OUTPATIENT)
Dept: RADIOLOGY | Facility: HOSPITAL | Age: 62
Discharge: HOME | End: 2022-11-22
Attending: FAMILY MEDICINE
Payer: MEDICARE

## 2022-11-22 VITALS
SYSTOLIC BLOOD PRESSURE: 136 MMHG | HEART RATE: 96 BPM | WEIGHT: 293 LBS | OXYGEN SATURATION: 97 % | TEMPERATURE: 98 F | HEIGHT: 63 IN | DIASTOLIC BLOOD PRESSURE: 84 MMHG | BODY MASS INDEX: 51.91 KG/M2 | RESPIRATION RATE: 18 BRPM

## 2022-11-22 DIAGNOSIS — L08.9 SKIN INFECTION, BACTERIAL: Primary | ICD-10-CM

## 2022-11-22 DIAGNOSIS — B96.89 SKIN INFECTION, BACTERIAL: ICD-10-CM

## 2022-11-22 DIAGNOSIS — B96.89 SKIN INFECTION, BACTERIAL: Primary | ICD-10-CM

## 2022-11-22 DIAGNOSIS — L08.9 SKIN INFECTION, BACTERIAL: ICD-10-CM

## 2022-11-22 PROBLEM — E66.01 MORBID OBESITY: Status: ACTIVE | Noted: 2022-11-22

## 2022-11-22 PROBLEM — G47.30 SLEEP APNEA: Status: ACTIVE | Noted: 2022-11-22

## 2022-11-22 PROBLEM — R92.8 ABNORMAL MAMMOGRAM OF RIGHT BREAST: Status: ACTIVE | Noted: 2022-11-22

## 2022-11-22 PROBLEM — U07.1 PNEUMONIA DUE TO COVID-19 VIRUS: Status: ACTIVE | Noted: 2020-07-22

## 2022-11-22 PROBLEM — K21.9 GASTROESOPHAGEAL REFLUX DISEASE: Status: ACTIVE | Noted: 2018-10-26

## 2022-11-22 PROBLEM — N60.01: Status: ACTIVE | Noted: 2022-11-22

## 2022-11-22 PROBLEM — Z99.89 USES CONTINUOUS POSITIVE AIRWAY PRESSURE (CPAP) VENTILATION AT HOME: Status: ACTIVE | Noted: 2022-11-22

## 2022-11-22 PROBLEM — J12.82 PNEUMONIA DUE TO COVID-19 VIRUS: Status: ACTIVE | Noted: 2020-07-22

## 2022-11-22 PROBLEM — M47.817 SPONDYLOSIS OF LUMBOSACRAL SPINE WITHOUT MYELOPATHY: Status: ACTIVE | Noted: 2022-11-22

## 2022-11-22 PROBLEM — M54.9 BACK PAIN: Status: ACTIVE | Noted: 2022-11-22

## 2022-11-22 PROBLEM — F32.A DEPRESSIVE DISORDER: Status: ACTIVE | Noted: 2022-11-22

## 2022-11-22 PROBLEM — N63.0 BREAST NODULE: Status: ACTIVE | Noted: 2019-04-30

## 2022-11-22 PROBLEM — R12 HEARTBURN: Status: ACTIVE | Noted: 2022-11-22

## 2022-11-22 PROBLEM — E55.9 VITAMIN D DEFICIENCY: Status: ACTIVE | Noted: 2018-10-26

## 2022-11-22 PROBLEM — E87.6 HYPOKALEMIA: Status: ACTIVE | Noted: 2018-10-26

## 2022-11-22 PROBLEM — G89.29 CHRONIC PAIN: Status: ACTIVE | Noted: 2018-10-26

## 2022-11-22 PROCEDURE — 1159F PR MEDICATION LIST DOCUMENTED IN MEDICAL RECORD: ICD-10-PCS | Mod: CPTII,,, | Performed by: FAMILY MEDICINE

## 2022-11-22 PROCEDURE — 1160F RVW MEDS BY RX/DR IN RCRD: CPT | Mod: CPTII,,, | Performed by: FAMILY MEDICINE

## 2022-11-22 PROCEDURE — 1160F PR REVIEW ALL MEDS BY PRESCRIBER/CLIN PHARMACIST DOCUMENTED: ICD-10-PCS | Mod: CPTII,,, | Performed by: FAMILY MEDICINE

## 2022-11-22 PROCEDURE — 1159F MED LIST DOCD IN RCRD: CPT | Mod: CPTII,,, | Performed by: FAMILY MEDICINE

## 2022-11-22 PROCEDURE — 99213 PR OFFICE/OUTPT VISIT, EST, LEVL III, 20-29 MIN: ICD-10-PCS | Mod: ,,, | Performed by: FAMILY MEDICINE

## 2022-11-22 PROCEDURE — 3075F PR MOST RECENT SYSTOLIC BLOOD PRESS GE 130-139MM HG: ICD-10-PCS | Mod: CPTII,,, | Performed by: FAMILY MEDICINE

## 2022-11-22 PROCEDURE — 99213 OFFICE O/P EST LOW 20 MIN: CPT | Mod: ,,, | Performed by: FAMILY MEDICINE

## 2022-11-22 PROCEDURE — 73590 X-RAY EXAM OF LOWER LEG: CPT | Mod: TC,RT

## 2022-11-22 PROCEDURE — 3075F SYST BP GE 130 - 139MM HG: CPT | Mod: CPTII,,, | Performed by: FAMILY MEDICINE

## 2022-11-22 PROCEDURE — 3079F DIAST BP 80-89 MM HG: CPT | Mod: CPTII,,, | Performed by: FAMILY MEDICINE

## 2022-11-22 PROCEDURE — 3008F BODY MASS INDEX DOCD: CPT | Mod: CPTII,,, | Performed by: FAMILY MEDICINE

## 2022-11-22 PROCEDURE — 3008F PR BODY MASS INDEX (BMI) DOCUMENTED: ICD-10-PCS | Mod: CPTII,,, | Performed by: FAMILY MEDICINE

## 2022-11-22 PROCEDURE — 3079F PR MOST RECENT DIASTOLIC BLOOD PRESSURE 80-89 MM HG: ICD-10-PCS | Mod: CPTII,,, | Performed by: FAMILY MEDICINE

## 2022-11-22 RX ORDER — SULFAMETHOXAZOLE AND TRIMETHOPRIM 800; 160 MG/1; MG/1
1 TABLET ORAL 2 TIMES DAILY
Qty: 20 TABLET | Refills: 0 | Status: SHIPPED | OUTPATIENT
Start: 2022-11-22 | End: 2022-11-22

## 2022-11-22 RX ORDER — HYDROCODONE BITARTRATE AND ACETAMINOPHEN 7.5; 325 MG/1; MG/1
1 TABLET ORAL 2 TIMES DAILY PRN
COMMUNITY

## 2022-11-22 RX ORDER — CHOLECALCIFEROL (VITAMIN D3) 25 MCG
1000 TABLET ORAL EVERY MORNING
COMMUNITY
End: 2023-07-03

## 2022-11-22 RX ORDER — MUPIROCIN 20 MG/G
OINTMENT TOPICAL 3 TIMES DAILY
Qty: 30 G | Refills: 1 | Status: SHIPPED | OUTPATIENT
Start: 2022-11-22 | End: 2022-12-02

## 2022-11-22 NOTE — PROGRESS NOTES
Patient ID: [id number]     Chief Complaint: Wound Check        HPI:     Kimberly Agarwal is a 62 y.o. female here today for Wound Check. Right lower extremity. Has been on Keflex and mupirocin. Original injury was on 10/22/22 from fall and suffered a laceration from a plastic pipe. Some purulent drainage. Sutures were in for 10 days. Now healing by secondary intent.         ----------------------------  Achilles tendinitis, left leg  Bursitis of right shoulder  Chronic back pain  Chronic pain  GERD (gastroesophageal reflux disease)  Herpes labialis  High cholesterol  HLD (hyperlipidemia)  HTN (hypertension)  Hypokalemia  Lower extremity edema  OAB (overactive bladder)  Personal history of colonic polyps      Comment:  s/p polypectomy - Dr Matheus Alba  RLS (restless legs syndrome)  Sciatica  Sleep apnea  Sleep disorder  Spondylosis of lumbosacral spine with radiculopathy  Urinary incontinence, mixed  Vitamin D deficiency     Past Surgical History:   Procedure Laterality Date    APPENDECTOMY      COLONOSCOPY W/ BIOPSIES AND POLYPECTOMY  04/27/2022    Dr Matheus Alba    EPIDURAL STEROID INJECTION INTO LUMBAR SPINE      Dr Lorelei Schmidt    ESOPHAGOGASTRODUODENOSCOPY  04/27/2022    Dr Matheus Alba    ESOPHAGOGASTRODUODENOSCOPY  12/09/2019    Dr Matheus Alba    LUMBAR FUSION  2010    Fused L4-5    OPEN REDUCTION AND INTERNAL FIXATION (ORIF) OF INJURY OF WRIST Right     RADIOFREQUENCY ABLATION OF LUMBAR MEDIAL BRANCH NERVE AT SINGLE LEVEL Bilateral 09/09/2022    Dr Phillip Clement MD    TONSILLECTOMY      TOTAL KNEE ARTHROPLASTY Left 2020       Review of patient's allergies indicates:   Allergen Reactions    Latex Rash    Adhesive tape-silicones Blisters    Ciprofloxacin        Outpatient Medications Marked as Taking for the 11/22/22 encounter (Office Visit) with Kai Back,    Medication Sig Dispense Refill    calcium carbonate/vitamin D3 (CALTRATE 600 PLUS D ORAL) Take 1 tablet by mouth 2 (two) times daily.       cetirizine (ZYRTEC) 10 MG tablet Take 10 mg by mouth every evening.      cyclobenzaprine (FLEXERIL) 10 MG tablet Take 10 mg by mouth 3 (three) times daily as needed.      DULoxetine (CYMBALTA) 30 MG capsule Take 60 mg by mouth every morning.      esomeprazole (NEXIUM) 20 MG capsule Take 20 mg by mouth before breakfast.      furosemide (LASIX) 40 MG tablet Take 40 mg by mouth 2 (two) times a day.      HYDROcodone-acetaminophen (NORCO) 7.5-325 mg per tablet Take 1 tablet by mouth 2 (two) times daily as needed for Pain.      losartan-hydrochlorothiazide 50-12.5 mg (HYZAAR) 50-12.5 mg per tablet Take 1 tablet by mouth every morning.      lutein 40 mg Cap Take 1 capsule by mouth Daily.      metFORMIN (GLUCOPHAGE) 500 MG tablet Take 1 tablet (500 mg total) by mouth 2 (two) times daily with meals. 180 tablet 3    mirabegron (MYRBETRIQ) 50 mg Tb24 Take 50 mg by mouth every evening.      polyethylene glycol (GLYCOLAX) 17 gram PwPk Take 17 g by mouth every evening.      potassium chloride SA (K-DUR,KLOR-CON) 20 MEQ tablet Take 20 mEq by mouth 2 (two) times a day.      pramipexole (MIRAPEX) 0.125 MG tablet Take 0.125 mg by mouth every evening.      pregabalin (LYRICA) 150 MG capsule Take 150 mg by mouth 2 (two) times a day.      simvastatin (ZOCOR) 20 MG tablet Take 20 mg by mouth every evening.      triamcinolone acetonide 0.1% (KENALOG) 0.1 % cream Apply topically 2 (two) times daily. for 14 days 80 g 0    vitamin D (VITAMIN D3) 1000 units Tab Take 1,000 Units by mouth Daily.         Social History     Socioeconomic History    Marital status:    Tobacco Use    Smoking status: Former     Packs/day: 0.25     Years: 5.00     Pack years: 1.25     Types: Cigarettes    Smokeless tobacco: Never   Substance and Sexual Activity    Alcohol use: Not Currently    Drug use: Never    Sexual activity: Not Currently     Partners: Male        Family History   Problem Relation Age of Onset    Hypertension Mother     Hyperlipidemia  "Mother     Stroke Mother     Hypertension Father     Diabetes Father     Hyperlipidemia Father     Heart disease Father     Diabetes Sister     Asthma Brother     Cancer Brother     COPD Brother     Heart disease Brother     Diabetes Mellitus Brother     Diabetes Brother     Heart disease Brother     Diabetes Brother     Heart disease Brother     Diabetes Brother     Diabetes Brother     Heart disease Brother         Patient Care Team:  Pedro Carvajal MD as PCP - General (Family Medicine)  Phillip Clement MD as Consulting Physician (Pain Medicine)  Matheus Alba MD as Consulting Physician (Gastroenterology)  Lorelei Schmidt MD as Anesthesiologist (Interventional Pain Medicine)  Yeison Leal MD (Orthopedic Surgery)     Subjective:     Review of Systems   Constitutional:  Negative for fever.   Cardiovascular:  Negative for chest pain and palpitations.   Musculoskeletal:  Negative for falls.   Skin:  Positive for rash.        Skin laceration with wound infection.    Neurological:  Negative for dizziness and headaches.     See HPI for details  All Other ROS: Negative except as stated in HPI.       Objective:     /84   Pulse 96   Temp 97.5 °F (36.4 °C) (Tympanic)   Resp 18   Ht 5' 3.39" (1.61 m)   Wt (!) 147.8 kg (325 lb 12.8 oz)   SpO2 97%   BMI 57.01 kg/m²     Physical Exam  Vitals reviewed.   Constitutional:       General: She is not in acute distress.     Appearance: Normal appearance.   Cardiovascular:      Rate and Rhythm: Normal rate and regular rhythm.      Heart sounds: No murmur heard.    No friction rub. No gallop.   Pulmonary:      Effort: No respiratory distress.      Breath sounds: No wheezing, rhonchi or rales.   Musculoskeletal:         General: Swelling present. No tenderness or deformity.      Right lower leg: No edema.      Left lower leg: No edema.   Skin:     General: Skin is warm and dry.      Findings: Erythema and lesion present. No rash.      Comments: RLE Lacertation with " surrounding erythema. No active drainage.    Neurological:      General: No focal deficit present.      Mental Status: She is alert.   Psychiatric:         Mood and Affect: Mood normal.       Assessment/Plan:     1. Skin infection, bacterial  -     X-Ray Tibia Fibula 2 View Right; Future; Expected date: 11/22/2022  -     mupirocin (BACTROBAN) 2 % ointment; Apply topically 3 (three) times daily. for 10 days  Dispense: 30 g; Refill: 1  -     Discontinue: sulfamethoxazole-trimethoprim 800-160mg (BACTRIM DS) 800-160 mg Tab; Take 1 tablet by mouth 2 (two) times daily. for 10 days  Dispense: 20 tablet; Refill: 0  -     Ambulatory referral/consult to Home Health; Future; Expected date: 11/23/2022   -Will order X-ray of Tib/fib to confirm no bone involvement given prolonged healing course.     Follow up:     Follow up in about 15 days (around 12/7/2022) for Follow up wound check. In addition to their scheduled follow up, the patient has also been instructed to follow up on as needed basis.

## 2022-11-29 PROCEDURE — G0180 MD CERTIFICATION HHA PATIENT: HCPCS | Mod: ,,, | Performed by: FAMILY MEDICINE

## 2022-11-29 PROCEDURE — G0180 PR HOME HEALTH MD CERTIFICATION: ICD-10-PCS | Mod: ,,, | Performed by: FAMILY MEDICINE

## 2022-12-05 ENCOUNTER — OFFICE VISIT (OUTPATIENT)
Dept: FAMILY MEDICINE | Facility: CLINIC | Age: 62
End: 2022-12-05
Payer: MEDICARE

## 2022-12-05 VITALS
WEIGHT: 293 LBS | BODY MASS INDEX: 51.91 KG/M2 | HEIGHT: 63 IN | TEMPERATURE: 98 F | DIASTOLIC BLOOD PRESSURE: 85 MMHG | OXYGEN SATURATION: 95 % | RESPIRATION RATE: 20 BRPM | SYSTOLIC BLOOD PRESSURE: 134 MMHG | HEART RATE: 105 BPM

## 2022-12-05 DIAGNOSIS — B37.2 YEAST DERMATITIS: ICD-10-CM

## 2022-12-05 DIAGNOSIS — L08.9 SKIN INFECTION, BACTERIAL: Primary | ICD-10-CM

## 2022-12-05 DIAGNOSIS — L71.9 ROSACEA: ICD-10-CM

## 2022-12-05 DIAGNOSIS — L29.9 PRURITUS: ICD-10-CM

## 2022-12-05 DIAGNOSIS — B96.89 SKIN INFECTION, BACTERIAL: Primary | ICD-10-CM

## 2022-12-05 PROCEDURE — 1160F RVW MEDS BY RX/DR IN RCRD: CPT | Mod: CPTII,,, | Performed by: FAMILY MEDICINE

## 2022-12-05 PROCEDURE — 3075F SYST BP GE 130 - 139MM HG: CPT | Mod: CPTII,,, | Performed by: FAMILY MEDICINE

## 2022-12-05 PROCEDURE — 3079F PR MOST RECENT DIASTOLIC BLOOD PRESSURE 80-89 MM HG: ICD-10-PCS | Mod: CPTII,,, | Performed by: FAMILY MEDICINE

## 2022-12-05 PROCEDURE — 99213 OFFICE O/P EST LOW 20 MIN: CPT | Mod: ,,, | Performed by: FAMILY MEDICINE

## 2022-12-05 PROCEDURE — 99213 PR OFFICE/OUTPT VISIT, EST, LEVL III, 20-29 MIN: ICD-10-PCS | Mod: ,,, | Performed by: FAMILY MEDICINE

## 2022-12-05 PROCEDURE — 3075F PR MOST RECENT SYSTOLIC BLOOD PRESS GE 130-139MM HG: ICD-10-PCS | Mod: CPTII,,, | Performed by: FAMILY MEDICINE

## 2022-12-05 PROCEDURE — 1159F MED LIST DOCD IN RCRD: CPT | Mod: CPTII,,, | Performed by: FAMILY MEDICINE

## 2022-12-05 PROCEDURE — 3008F BODY MASS INDEX DOCD: CPT | Mod: CPTII,,, | Performed by: FAMILY MEDICINE

## 2022-12-05 PROCEDURE — 3008F PR BODY MASS INDEX (BMI) DOCUMENTED: ICD-10-PCS | Mod: CPTII,,, | Performed by: FAMILY MEDICINE

## 2022-12-05 PROCEDURE — 3079F DIAST BP 80-89 MM HG: CPT | Mod: CPTII,,, | Performed by: FAMILY MEDICINE

## 2022-12-05 PROCEDURE — 1160F PR REVIEW ALL MEDS BY PRESCRIBER/CLIN PHARMACIST DOCUMENTED: ICD-10-PCS | Mod: CPTII,,, | Performed by: FAMILY MEDICINE

## 2022-12-05 PROCEDURE — 1159F PR MEDICATION LIST DOCUMENTED IN MEDICAL RECORD: ICD-10-PCS | Mod: CPTII,,, | Performed by: FAMILY MEDICINE

## 2022-12-05 RX ORDER — BETAMETHASONE VALERATE 1.2 MG/G
1 CREAM TOPICAL 2 TIMES DAILY
Qty: 45 G | Refills: 2 | Status: SHIPPED | OUTPATIENT
Start: 2022-12-05 | End: 2023-01-04

## 2022-12-05 RX ORDER — BETAMETHASONE VALERATE 1.2 MG/G
1 CREAM TOPICAL 2 TIMES DAILY
COMMUNITY
End: 2022-12-05 | Stop reason: SDUPTHER

## 2022-12-05 RX ORDER — METRONIDAZOLE 7.5 MG/G
GEL TOPICAL 2 TIMES DAILY
Qty: 45 G | Refills: 2 | Status: SHIPPED | OUTPATIENT
Start: 2022-12-05 | End: 2023-02-28

## 2022-12-05 NOTE — PROGRESS NOTES
Patient ID: [id number]     Chief Complaint: Follow-up (Wound recheck)        HPI:     Kimberly Agarwal is a 62 y.o. female here today for Follow-up (Wound recheck). Also requesting medication for her rosacea and rash under her breasts and in her skin folds.       ----------------------------  Achilles tendinitis, left leg  Bursitis of right shoulder  Chronic back pain  Chronic pain  GERD (gastroesophageal reflux disease)  Herpes labialis  High cholesterol  HLD (hyperlipidemia)  HTN (hypertension)  Hypokalemia  Lower extremity edema  OAB (overactive bladder)  Personal history of colonic polyps      Comment:  s/p polypectomy - Dr Matheus Alba  RLS (restless legs syndrome)  Sciatica  Sleep apnea  Sleep disorder  Spondylosis of lumbosacral spine with radiculopathy  Urinary incontinence, mixed  Vitamin D deficiency     Past Surgical History:   Procedure Laterality Date    APPENDECTOMY      COLONOSCOPY W/ BIOPSIES AND POLYPECTOMY  04/27/2022    Dr Matheus Alba    EPIDURAL STEROID INJECTION INTO LUMBAR SPINE      Dr Lorelei Schmidt    ESOPHAGOGASTRODUODENOSCOPY  04/27/2022    Dr Matheus Alba    ESOPHAGOGASTRODUODENOSCOPY  12/09/2019    Dr Matheus Alba    LUMBAR FUSION  2010    Fused L4-5    OPEN REDUCTION AND INTERNAL FIXATION (ORIF) OF INJURY OF WRIST Right     RADIOFREQUENCY ABLATION OF LUMBAR MEDIAL BRANCH NERVE AT SINGLE LEVEL Bilateral 09/09/2022    Dr Phillip Clement MD    TONSILLECTOMY      TOTAL KNEE ARTHROPLASTY Left 2020       Review of patient's allergies indicates:   Allergen Reactions    Latex Rash    Adhesive tape-silicones Blisters    Ciprofloxacin        Outpatient Medications Marked as Taking for the 12/5/22 encounter (Office Visit) with Kai Back,    Medication Sig Dispense Refill    calcium carbonate/vitamin D3 (CALTRATE 600 PLUS D ORAL) Take 1 tablet by mouth 2 (two) times daily.      cetirizine (ZYRTEC) 10 MG tablet Take 10 mg by mouth every evening.      cyclobenzaprine (FLEXERIL) 10 MG tablet  Take 10 mg by mouth 3 (three) times daily as needed.      DULoxetine (CYMBALTA) 30 MG capsule Take 60 mg by mouth every morning.      esomeprazole (NEXIUM) 20 MG capsule Take 20 mg by mouth before breakfast.      furosemide (LASIX) 40 MG tablet Take 40 mg by mouth 2 (two) times a day.      HYDROcodone-acetaminophen (NORCO) 7.5-325 mg per tablet Take 1 tablet by mouth 2 (two) times daily as needed for Pain.      losartan-hydrochlorothiazide 50-12.5 mg (HYZAAR) 50-12.5 mg per tablet Take 1 tablet by mouth every morning.      lutein 40 mg Cap Take 1 capsule by mouth Daily.      metFORMIN (GLUCOPHAGE) 500 MG tablet Take 1 tablet (500 mg total) by mouth 2 (two) times daily with meals. 180 tablet 3    mirabegron (MYRBETRIQ) 50 mg Tb24 Take 50 mg by mouth every evening.      polyethylene glycol (GLYCOLAX) 17 gram PwPk Take 17 g by mouth every evening.      potassium chloride SA (K-DUR,KLOR-CON) 20 MEQ tablet Take 20 mEq by mouth 2 (two) times a day.      pramipexole (MIRAPEX) 0.125 MG tablet Take 0.125 mg by mouth every evening.      pregabalin (LYRICA) 150 MG capsule Take 150 mg by mouth 2 (two) times a day.      simvastatin (ZOCOR) 20 MG tablet Take 20 mg by mouth every evening.      vitamin D (VITAMIN D3) 1000 units Tab Take 1,000 Units by mouth Daily.         Social History     Socioeconomic History    Marital status:    Tobacco Use    Smoking status: Former     Packs/day: 0.25     Years: 5.00     Pack years: 1.25     Types: Cigarettes    Smokeless tobacco: Never   Substance and Sexual Activity    Alcohol use: Not Currently    Drug use: Never    Sexual activity: Not Currently     Partners: Male        Family History   Problem Relation Age of Onset    Hypertension Mother     Hyperlipidemia Mother     Stroke Mother     Hypertension Father     Diabetes Father     Hyperlipidemia Father     Heart disease Father     Diabetes Sister     Asthma Brother     Cancer Brother     COPD Brother     Heart disease Brother      "Diabetes Mellitus Brother     Diabetes Brother     Heart disease Brother     Diabetes Brother     Heart disease Brother     Diabetes Brother     Diabetes Brother     Heart disease Brother         Patient Care Team:  Pedro Carvajal MD as PCP - General (Family Medicine)  Phillip Clement MD as Consulting Physician (Pain Medicine)  Matheus Alba MD as Consulting Physician (Gastroenterology)  Lorelei Schmidt MD as Anesthesiologist (Interventional Pain Medicine)  Yeison Leal MD (Orthopedic Surgery)     Subjective:     Review of Systems   Constitutional:  Negative for chills and fever.   Respiratory:  Negative for shortness of breath.    Cardiovascular:  Negative for chest pain and palpitations.   Skin:  Positive for rash.   Neurological:  Negative for dizziness and headaches.     See HPI for details  All Other ROS: Negative except as stated in HPI.       Objective:     /85 (BP Location: Left arm, Patient Position: Sitting, BP Method: X-Large (Automatic))   Pulse 105   Temp 98.1 °F (36.7 °C)   Resp 20   Ht 5' 3" (1.6 m)   Wt (!) 147.4 kg (325 lb)   SpO2 95%   BMI 57.57 kg/m²     Physical Exam  Vitals reviewed.   Constitutional:       General: She is not in acute distress.     Appearance: Normal appearance.   Cardiovascular:      Rate and Rhythm: Normal rate and regular rhythm.      Heart sounds: No murmur heard.    No friction rub. No gallop.   Pulmonary:      Effort: No respiratory distress.      Breath sounds: No wheezing, rhonchi or rales.   Musculoskeletal:         General: No swelling, tenderness or deformity.      Right lower leg: No edema.      Left lower leg: No edema.   Skin:     General: Skin is warm and dry.      Findings: Lesion and rash present.   Neurological:      General: No focal deficit present.      Mental Status: She is alert.   Psychiatric:         Mood and Affect: Mood normal.       Assessment/Plan:     1. Skin infection, bacterial  -Continue current treatment plan  2. Rosacea  -  "    metroNIDAZOLE (METROGEL) 0.75 % gel; Apply topically 2 (two) times daily.  Dispense: 45 g; Refill: 2    3. Pruritus  -     betamethasone valerate 0.1% (VALISONE) 0.1 % Crea; Apply 1 application topically 2 (two) times a day.  Dispense: 45 g; Refill: 2    4. Yeast dermatitis    -Recommended topical OTC antifungal powder for 4-6 weeks BID     Follow up:     Follow up in about 3 months (around 3/5/2023) for Follow up. In addition to their scheduled follow up, the patient has also been instructed to follow up on as needed basis.

## 2022-12-12 ENCOUNTER — EXTERNAL HOME HEALTH (OUTPATIENT)
Dept: HOME HEALTH SERVICES | Facility: HOSPITAL | Age: 62
End: 2022-12-12
Payer: MEDICARE

## 2023-02-06 PROBLEM — Z00.00 WELLNESS EXAMINATION: Status: RESOLVED | Noted: 2022-11-04 | Resolved: 2023-02-06

## 2023-02-08 ENCOUNTER — OFFICE VISIT (OUTPATIENT)
Dept: URGENT CARE | Facility: CLINIC | Age: 63
End: 2023-02-08
Payer: MEDICARE

## 2023-02-08 VITALS
DIASTOLIC BLOOD PRESSURE: 84 MMHG | RESPIRATION RATE: 20 BRPM | BODY MASS INDEX: 53.92 KG/M2 | WEIGHT: 293 LBS | SYSTOLIC BLOOD PRESSURE: 164 MMHG | OXYGEN SATURATION: 97 % | HEIGHT: 62 IN | TEMPERATURE: 99 F | HEART RATE: 112 BPM

## 2023-02-08 DIAGNOSIS — U07.1 COVID-19: Primary | ICD-10-CM

## 2023-02-08 PROCEDURE — 3077F SYST BP >= 140 MM HG: CPT | Mod: CPTII,,, | Performed by: PHYSICIAN ASSISTANT

## 2023-02-08 PROCEDURE — 1159F PR MEDICATION LIST DOCUMENTED IN MEDICAL RECORD: ICD-10-PCS | Mod: CPTII,,, | Performed by: PHYSICIAN ASSISTANT

## 2023-02-08 PROCEDURE — 3079F DIAST BP 80-89 MM HG: CPT | Mod: CPTII,,, | Performed by: PHYSICIAN ASSISTANT

## 2023-02-08 PROCEDURE — 3077F PR MOST RECENT SYSTOLIC BLOOD PRESSURE >= 140 MM HG: ICD-10-PCS | Mod: CPTII,,, | Performed by: PHYSICIAN ASSISTANT

## 2023-02-08 PROCEDURE — 99203 PR OFFICE/OUTPT VISIT, NEW, LEVL III, 30-44 MIN: ICD-10-PCS | Mod: ,,, | Performed by: PHYSICIAN ASSISTANT

## 2023-02-08 PROCEDURE — 99203 OFFICE O/P NEW LOW 30 MIN: CPT | Mod: ,,, | Performed by: PHYSICIAN ASSISTANT

## 2023-02-08 PROCEDURE — 1159F MED LIST DOCD IN RCRD: CPT | Mod: CPTII,,, | Performed by: PHYSICIAN ASSISTANT

## 2023-02-08 PROCEDURE — 1160F RVW MEDS BY RX/DR IN RCRD: CPT | Mod: CPTII,,, | Performed by: PHYSICIAN ASSISTANT

## 2023-02-08 PROCEDURE — 1160F PR REVIEW ALL MEDS BY PRESCRIBER/CLIN PHARMACIST DOCUMENTED: ICD-10-PCS | Mod: CPTII,,, | Performed by: PHYSICIAN ASSISTANT

## 2023-02-08 PROCEDURE — 3079F PR MOST RECENT DIASTOLIC BLOOD PRESSURE 80-89 MM HG: ICD-10-PCS | Mod: CPTII,,, | Performed by: PHYSICIAN ASSISTANT

## 2023-02-08 PROCEDURE — 3008F PR BODY MASS INDEX (BMI) DOCUMENTED: ICD-10-PCS | Mod: CPTII,,, | Performed by: PHYSICIAN ASSISTANT

## 2023-02-08 PROCEDURE — 3008F BODY MASS INDEX DOCD: CPT | Mod: CPTII,,, | Performed by: PHYSICIAN ASSISTANT

## 2023-02-08 RX ORDER — ALBUTEROL SULFATE 90 UG/1
1-2 AEROSOL, METERED RESPIRATORY (INHALATION) EVERY 6 HOURS PRN
Qty: 1 G | Refills: 0 | Status: SHIPPED | OUTPATIENT
Start: 2023-02-08 | End: 2023-07-03

## 2023-02-08 RX ORDER — VALACYCLOVIR HYDROCHLORIDE 500 MG/1
500 TABLET, FILM COATED ORAL DAILY PRN
COMMUNITY
Start: 2022-12-28

## 2023-02-08 RX ORDER — SERTRALINE HYDROCHLORIDE 50 MG/1
50 TABLET, FILM COATED ORAL
COMMUNITY
End: 2023-02-17

## 2023-02-08 RX ORDER — PROMETHAZINE HYDROCHLORIDE AND DEXTROMETHORPHAN HYDROBROMIDE 6.25; 15 MG/5ML; MG/5ML
5 SYRUP ORAL EVERY 8 HOURS PRN
Qty: 118 ML | Refills: 0 | Status: SHIPPED | OUTPATIENT
Start: 2023-02-08 | End: 2023-02-13

## 2023-02-08 NOTE — PATIENT INSTRUCTIONS
COVID Positive  Start vitamin C 1000mg twice a day, zinc 100mg once a day, and vitamin D3 at least 2000 units a day. Current CDC guidelines recommend that you quarantine for 5 days starting the day after your symptoms began. Quarantine can end after 5 days as long as the last 24 hours of quarantine you are fever free and there is improvement of all your symptoms. Wear a mask around others for 5 additional days after quarantine. Treat your symptoms as you would the common cold. If you live with anybody, isolate yourself in a separate bedroom and use a separate bathroom. If your symptoms worsen or you develop shortness of breath or a fever over 102.5 , seek medical attention immediately.  Recommend alternate Tylenol and ibuprofen every 4-6 hours as needed for aches pains fever chills.      Recommend temporary stop on simvastatin cholesterol medication which may cause unwanted side effects while taking Paxlovid today to help reduce viral sick time return to cholesterol medication after viral prescription.  Recommend alternate decongestant antihistamine nasal spray as needed for upper respiratory symptoms.  Phenergan DM sparingly lows doses needed for severe cough cold congestion body aches.  Recommend plenty water noncarbonated fluids hydration over the next 5-7 days.  Albuterol inhaler 2 puffs up to 4-6 times daily as needed for cough wheezing shortness breath or chest tightness.  Recommend follow-up with primary care physician 1 week for re-evaluation if not improving.  Recommended emergency department evaluation sooner if symptoms worsen

## 2023-02-08 NOTE — PROGRESS NOTES
"Subjective:       Patient ID: Kimberly Agarwal is a 62 y.o. female.    Vitals:  height is 5' 2" (1.575 m) and weight is 147.4 kg (325 lb) (abnormal). Her oral temperature is 98.6 °F (37 °C). Her blood pressure is 164/84 (abnormal) and her pulse is 112 (abnormal). Her respiration is 20 and oxygen saturation is 97%.     Chief Complaint: Sinus Problem (Pt symptoms started yesterday, coughing, hoarse, headache, mucus, sinus congestion, and sore throat.//Pt tested positive for Covid last night (at home test))    HPI  patient reports in the last 24 hours developing acute URI symptoms cough cold congestion body aches presents to urgent care today after positive home COVID testing for treatment    Sinus Problem     Additional comments: Pt symptoms started yesterday, coughing, hoarse,   headache, mucus, sinus congestion, and sore throat.    Pt tested positive for Covid last night (at home test)    Sinus Problem  Associated symptoms include congestion, coughing, headaches, sinus pressure and a sore throat. Pertinent negatives include no ear pain, neck pain or shortness of breath.     Constitution: Positive for fatigue and fever.   HENT:  Positive for congestion, postnasal drip, sinus pressure, sore throat and voice change. Negative for ear pain, sinus pain and trouble swallowing.    Neck: Negative for neck pain and neck swelling.   Cardiovascular:  Negative for chest pain.   Respiratory:  Positive for chest tightness and cough. Negative for shortness of breath, stridor and wheezing.    Gastrointestinal: Negative.  Negative for nausea and vomiting.   Musculoskeletal:  Positive for muscle ache.   Skin: Negative.  Negative for erythema.   Allergic/Immunologic: Negative.    Neurological:  Positive for headaches. Negative for altered mental status.   Psychiatric/Behavioral:  Negative for altered mental status.      Objective:      Physical Exam   Constitutional: She is oriented to person, place, and time. She appears " well-developed. She is cooperative.  Non-toxic appearance.      Comments:Awake alert ambulatory nasally congested female speaks in complete sentences no trismus no drooling no muffled voice   obesity  HENT:   Head: Normocephalic and atraumatic.   Ears:   Right Ear: Hearing, tympanic membrane, external ear and ear canal normal.   Left Ear: Hearing, tympanic membrane, external ear and ear canal normal.   Nose: Congestion present. No mucosal edema, rhinorrhea or nasal deformity. No epistaxis. Right sinus exhibits no maxillary sinus tenderness and no frontal sinus tenderness. Left sinus exhibits no maxillary sinus tenderness and no frontal sinus tenderness.   Mouth/Throat: Uvula is midline, oropharynx is clear and moist and mucous membranes are normal. Mucous membranes are moist. No trismus in the jaw. Normal dentition. No uvula swelling. No oropharyngeal exudate, posterior oropharyngeal edema or posterior oropharyngeal erythema.      Comments: No Edema  Eyes: Conjunctivae and lids are normal. No scleral icterus.   Neck: Trachea normal and phonation normal. Neck supple. Carotid bruit is not present. No edema present. No erythema present. No neck rigidity present.   Cardiovascular: Regular rhythm, normal heart sounds and normal pulses. Tachycardia present.   No murmur heard.Exam reveals no gallop.   Pulmonary/Chest: Effort normal and breath sounds normal. No stridor. No respiratory distress. She has no decreased breath sounds. She has no wheezes. She has no rhonchi. She has no rales.   Musculoskeletal: Normal range of motion.         General: Normal range of motion.      Cervical back: She exhibits no tenderness.   Lymphadenopathy:     She has no cervical adenopathy.   Neurological: no focal deficit. She is alert and oriented to person, place, and time. She exhibits normal muscle tone.   Skin: Skin is warm, dry, intact, not diaphoretic, not pale and no rash. No erythema   Psychiatric: Her speech is normal and behavior is  normal. Mood, judgment and thought content normal.   Nursing note and vitals reviewed.         Previous History      Review of patient's allergies indicates:   Allergen Reactions    Latex Rash    Adhesive tape-silicones Blisters    Ciprofloxacin        Past Medical History:   Diagnosis Date    Achilles tendinitis, left leg     Bursitis of right shoulder     Chronic back pain     Chronic pain     GERD (gastroesophageal reflux disease)     Hepatitis a without hepatic coma     Herpes labialis     High cholesterol     HLD (hyperlipidemia)     HTN (hypertension)     Hypokalemia     Lower extremity edema     OAB (overactive bladder)     Personal history of colonic polyps 04/27/2022    s/p polypectomy - Dr Matheus Alba    RLS (restless legs syndrome)     Sciatica     Sleep apnea     Sleep disorder     Spondylosis of lumbosacral spine with radiculopathy     Urinary incontinence, mixed     Vitamin D deficiency      Current Outpatient Medications   Medication Instructions    albuterol (PROVENTIL/VENTOLIN HFA) 90 mcg/actuation inhaler 1-2 puffs, Inhalation, Every 6 hours PRN, Rescue    betamethasone valerate 0.1% (VALISONE) 0.1 % Crea Topical (Top), Daily    calcium carbonate/vitamin D3 (CALTRATE 600 PLUS D ORAL) 1 tablet, Oral, 2 times daily    cetirizine (ZYRTEC) 10 mg, Oral, Nightly    cyclobenzaprine (FLEXERIL) 10 mg, Oral, 3 times daily PRN    DULoxetine (CYMBALTA) 60 mg, Oral, Every morning    esomeprazole (NEXIUM) 20 mg, Oral, Before breakfast    furosemide (LASIX) 40 mg, Oral, 2 times daily    HYDROcodone-acetaminophen (NORCO) 7.5-325 mg per tablet 1 tablet, Oral, 2 times daily PRN    losartan-hydrochlorothiazide 50-12.5 mg (HYZAAR) 50-12.5 mg per tablet 1 tablet, Oral, Every morning    lutein 40 mg Cap 1 capsule, Oral, Daily    metFORMIN (GLUCOPHAGE) 500 mg, Oral, 2 times daily with meals    metroNIDAZOLE (METROGEL) 0.75 % gel Topical (Top), 2 times daily    mirabegron (MYRBETRIQ) 50 mg, Oral, Nightly     nirmatrelvir-ritonavir 300 mg (150 mg x 2)-100 mg copackaged tablets (EUA) Take 3 tablets by mouth 2 (two) times daily. Each dose contains 2 nirmatrelvir (pink tablets) and 1 ritonavir (white tablet). Take all 3 tablets together    polyethylene glycol (GLYCOLAX) 17 g, Oral, Nightly    potassium chloride SA (K-DUR,KLOR-CON) 20 MEQ tablet 20 mEq, Oral, 2 times daily    pramipexole (MIRAPEX) 0.125 mg, Oral, Nightly    pregabalin (LYRICA) 150 mg, Oral, 2 times daily    promethazine-dextromethorphan (PROMETHAZINE-DM) 6.25-15 mg/5 mL Syrp 5 mLs, Oral, Every 8 hours PRN    sertraline (ZOLOFT) 50 mg, Oral    simvastatin (ZOCOR) 20 mg, Oral, Nightly    triamcinolone acetonide 0.1% (KENALOG) 0.1 % cream Topical (Top), 2 times daily    valACYclovir (VALTREX) 500 MG tablet Oral    vitamin D (VITAMIN D3) 1,000 Units, Oral, Daily     Past Surgical History:   Procedure Laterality Date    APPENDECTOMY      COLONOSCOPY W/ BIOPSIES AND POLYPECTOMY  04/27/2022    Dr Matheus Alba    EPIDURAL STEROID INJECTION INTO LUMBAR SPINE      Dr Lorelei Schmidt    ESOPHAGOGASTRODUODENOSCOPY  04/27/2022    Dr Matheus Alba    ESOPHAGOGASTRODUODENOSCOPY  12/09/2019    Dr Matheus Alba    LIVER BIOPSY      LUMBAR FUSION  2010    Fused L4-5    OPEN REDUCTION AND INTERNAL FIXATION (ORIF) OF INJURY OF WRIST Right     RADIOFREQUENCY ABLATION OF LUMBAR MEDIAL BRANCH NERVE AT SINGLE LEVEL Bilateral 09/09/2022    Dr Phillip Clement MD    SPINE SURGERY      TONSILLECTOMY      TOTAL KNEE ARTHROPLASTY Left 2020     Family History   Problem Relation Age of Onset    Hypertension Mother     Hyperlipidemia Mother     Stroke Mother     Hypertension Father     Diabetes Father     Hyperlipidemia Father     Heart disease Father     Diabetes Sister     Asthma Brother     Cancer Brother     COPD Brother     Heart disease Brother     Diabetes Mellitus Brother     Diabetes Brother     Heart disease Brother     Diabetes Brother     Heart disease Brother     Diabetes Brother      "Diabetes Brother     Heart disease Brother        Social History     Tobacco Use    Smoking status: Former     Packs/day: 0.25     Years: 5.00     Pack years: 1.25     Types: Cigarettes    Smokeless tobacco: Never   Substance Use Topics    Alcohol use: Not Currently    Drug use: Never        Physical Exam      Vital Signs Reviewed   BP (!) 164/84   Pulse (!) 112   Temp 98.6 °F (37 °C) (Oral)   Resp 20   Ht 5' 2" (1.575 m)   Wt (!) 147.4 kg (325 lb)   SpO2 97%   BMI 59.44 kg/m²        Procedures    Procedures     Labs     Results for orders placed or performed in visit on 10/31/22   Comprehensive Metabolic Panel   Result Value Ref Range    Sodium Level 147 (H) 136 - 145 mmol/L    Potassium Level 4.4 3.5 - 5.1 mmol/L    Chloride 111 (H) 98 - 107 mmol/L    Carbon Dioxide 29 23 - 31 mmol/L    Glucose Level 105 82 - 115 mg/dL    Blood Urea Nitrogen 18.0 9.8 - 20.1 mg/dL    Creatinine 0.80 0.55 - 1.02 mg/dL    Calcium Level Total 9.1 8.4 - 10.2 mg/dL    Protein Total 6.5 5.8 - 7.6 gm/dL    Albumin Level 3.5 3.4 - 4.8 gm/dL    Globulin 3.0 2.4 - 3.5 gm/dL    Albumin/Globulin Ratio 1.2 1.1 - 2.0 ratio    Bilirubin Total 0.4 <=1.5 mg/dL    Alkaline Phosphatase 86 40 - 150 unit/L    Alanine Aminotransferase 14 0 - 55 unit/L    Aspartate Aminotransferase 13 5 - 34 unit/L    eGFR >60 mls/min/1.73/m2   Vitamin D   Result Value Ref Range    Vit D 25 OH 46.9 30.0 - 80.0 ng/mL   Lipid Panel   Result Value Ref Range    Cholesterol Total 179 <=200 mg/dL    HDL Cholesterol 43 35 - 60 mg/dL    Triglyceride 220 (H) 37 - 140 mg/dL    Cholesterol/HDL Ratio 4 0 - 5    Very Low Density Lipoprotein 44     LDL Cholesterol 92.00 50.00 - 140.00 mg/dL   Hepatitis C Antibody   Result Value Ref Range    Hepatitis C Antibody Nonreactive Nonreactive   HIV 1/2 Ag/Ab (4th Gen)   Result Value Ref Range    HIV Nonreactive Nonreactive   Hemoglobin A1C   Result Value Ref Range    Hemoglobin A1c 5.7 <=7.0 %    Estimated Average Glucose 116.9 mg/dL "   CBC with Differential   Result Value Ref Range    WBC 5.5 4.5 - 11.5 x10(3)/mcL    RBC 4.21 4.20 - 5.40 x10(6)/mcL    Hgb 12.3 12.0 - 16.0 gm/dL    Hct 38.8 37.0 - 47.0 %    MCV 92.2 80.0 - 94.0 fL    MCH 29.2 27.0 - 31.0 pg    MCHC 31.7 (L) 33.0 - 36.0 mg/dL    RDW 14.3 11.5 - 17.0 %    Platelet 195 130 - 400 x10(3)/mcL    MPV 11.1 (H) 7.4 - 10.4 fL    Neut % 60.7 %    Lymph % 24.2 %    Mono % 10.4 %    Eos % 3.3 %    Basophil % 0.7 %    Lymph # 1.33 0.6 - 4.6 x10(3)/mcL    Neut # 3.3 2.1 - 9.2 x10(3)/mcL    Mono # 0.57 0.1 - 1.3 x10(3)/mcL    Eos # 0.18 0 - 0.9 x10(3)/mcL    Baso # 0.04 0 - 0.2 x10(3)/mcL    IG# 0.04 0 - 0.04 x10(3)/mcL    IG% 0.7 %    NRBC% 0.0 %       Assessment:       1. COVID-19            Plan:     COVID Positive  Start vitamin C 1000mg twice a day, zinc 100mg once a day, and vitamin D3 at least 2000 units a day. Current CDC guidelines recommend that you quarantine for 5 days starting the day after your symptoms began. Quarantine can end after 5 days as long as the last 24 hours of quarantine you are fever free and there is improvement of all your symptoms. Wear a mask around others for 5 additional days after quarantine. Treat your symptoms as you would the common cold. If you live with anybody, isolate yourself in a separate bedroom and use a separate bathroom. If your symptoms worsen or you develop shortness of breath or a fever over 102.5 , seek medical attention immediately.  Recommend alternate Tylenol and ibuprofen every 4-6 hours as needed for aches pains fever chills.      Recommend temporary stop on simvastatin cholesterol medication which may cause unwanted side effects while taking Paxlovid today to help reduce viral sick time return to cholesterol medication after viral prescription.  Recommend alternate decongestant antihistamine nasal spray as needed for upper respiratory symptoms.  Phenergan DM sparingly lows doses needed for severe cough cold congestion body aches.   Recommend plenty water noncarbonated fluids hydration over the next 5-7 days.  Albuterol inhaler 2 puffs up to 4-6 times daily as needed for cough wheezing shortness breath or chest tightness.  Recommend follow-up with primary care physician 1 week for re-evaluation if not improving.  Recommended emergency department evaluation sooner if symptoms worsen      COVID-19    Other orders  -     nirmatrelvir-ritonavir 300 mg (150 mg x 2)-100 mg copackaged tablets (EUA); Take 3 tablets by mouth 2 (two) times daily. Each dose contains 2 nirmatrelvir (pink tablets) and 1 ritonavir (white tablet). Take all 3 tablets together  Dispense: 30 tablet; Refill: 0  -     promethazine-dextromethorphan (PROMETHAZINE-DM) 6.25-15 mg/5 mL Syrp; Take 5 mLs by mouth every 8 (eight) hours as needed (cough).  Dispense: 118 mL; Refill: 0  -     albuterol (PROVENTIL/VENTOLIN HFA) 90 mcg/actuation inhaler; Inhale 1-2 puffs into the lungs every 6 (six) hours as needed for Wheezing or Shortness of Breath. Rescue  Dispense: 1 g; Refill: 0

## 2023-02-15 ENCOUNTER — PATIENT OUTREACH (OUTPATIENT)
Dept: ADMINISTRATIVE | Facility: HOSPITAL | Age: 63
End: 2023-02-15
Payer: MEDICARE

## 2023-02-15 NOTE — PROGRESS NOTES
Population Health. [facility]. Reviewing patient's chart for quality metrics. Made attempt to contact pt to see if she has had a recent pap or colonoscopy. No answer. Left message

## 2023-02-17 ENCOUNTER — OFFICE VISIT (OUTPATIENT)
Dept: FAMILY MEDICINE | Facility: CLINIC | Age: 63
End: 2023-02-17
Payer: MEDICARE

## 2023-02-17 VITALS
OXYGEN SATURATION: 96 % | DIASTOLIC BLOOD PRESSURE: 80 MMHG | BODY MASS INDEX: 53.92 KG/M2 | HEART RATE: 101 BPM | HEIGHT: 62 IN | SYSTOLIC BLOOD PRESSURE: 137 MMHG | WEIGHT: 293 LBS | RESPIRATION RATE: 20 BRPM | TEMPERATURE: 98 F

## 2023-02-17 DIAGNOSIS — R39.15 URINARY URGENCY: Primary | ICD-10-CM

## 2023-02-17 DIAGNOSIS — S81.801D WOUND OF RIGHT LOWER EXTREMITY, SUBSEQUENT ENCOUNTER: Primary | ICD-10-CM

## 2023-02-17 DIAGNOSIS — I10 PRIMARY HYPERTENSION: Chronic | ICD-10-CM

## 2023-02-17 PROBLEM — G25.81 RESTLESS LEGS: Status: ACTIVE | Noted: 2018-10-26

## 2023-02-17 PROCEDURE — 1159F PR MEDICATION LIST DOCUMENTED IN MEDICAL RECORD: ICD-10-PCS | Mod: CPTII,,, | Performed by: FAMILY MEDICINE

## 2023-02-17 PROCEDURE — 1160F RVW MEDS BY RX/DR IN RCRD: CPT | Mod: CPTII,,, | Performed by: FAMILY MEDICINE

## 2023-02-17 PROCEDURE — 99213 OFFICE O/P EST LOW 20 MIN: CPT | Mod: ,,, | Performed by: FAMILY MEDICINE

## 2023-02-17 PROCEDURE — 3075F PR MOST RECENT SYSTOLIC BLOOD PRESS GE 130-139MM HG: ICD-10-PCS | Mod: CPTII,,, | Performed by: FAMILY MEDICINE

## 2023-02-17 PROCEDURE — 99213 PR OFFICE/OUTPT VISIT, EST, LEVL III, 20-29 MIN: ICD-10-PCS | Mod: ,,, | Performed by: FAMILY MEDICINE

## 2023-02-17 PROCEDURE — 3079F DIAST BP 80-89 MM HG: CPT | Mod: CPTII,,, | Performed by: FAMILY MEDICINE

## 2023-02-17 PROCEDURE — 3008F PR BODY MASS INDEX (BMI) DOCUMENTED: ICD-10-PCS | Mod: CPTII,,, | Performed by: FAMILY MEDICINE

## 2023-02-17 PROCEDURE — 3008F BODY MASS INDEX DOCD: CPT | Mod: CPTII,,, | Performed by: FAMILY MEDICINE

## 2023-02-17 PROCEDURE — 3075F SYST BP GE 130 - 139MM HG: CPT | Mod: CPTII,,, | Performed by: FAMILY MEDICINE

## 2023-02-17 PROCEDURE — 3079F PR MOST RECENT DIASTOLIC BLOOD PRESSURE 80-89 MM HG: ICD-10-PCS | Mod: CPTII,,, | Performed by: FAMILY MEDICINE

## 2023-02-17 PROCEDURE — 1160F PR REVIEW ALL MEDS BY PRESCRIBER/CLIN PHARMACIST DOCUMENTED: ICD-10-PCS | Mod: CPTII,,, | Performed by: FAMILY MEDICINE

## 2023-02-17 PROCEDURE — 1159F MED LIST DOCD IN RCRD: CPT | Mod: CPTII,,, | Performed by: FAMILY MEDICINE

## 2023-02-17 RX ORDER — BENZONATATE 200 MG/1
200 CAPSULE ORAL 3 TIMES DAILY PRN
Qty: 30 CAPSULE | Refills: 0 | Status: SHIPPED | OUTPATIENT
Start: 2023-02-17 | End: 2023-02-27

## 2023-02-17 RX ORDER — GUAIFENESIN 600 MG/1
1200 TABLET, EXTENDED RELEASE ORAL 2 TIMES DAILY
Qty: 20 TABLET | Refills: 1 | Status: SHIPPED | OUTPATIENT
Start: 2023-02-17 | End: 2023-02-27

## 2023-02-17 NOTE — PROGRESS NOTES
Patient ID: [id number]     Chief Complaint: Hypertension and Follow-up (Requesting home health referral for wound care)        HPI:     Kimberly Agarwal is a 62 y.o. female here today for Hypertension and Follow-up (Requesting home health referral for wound care) No other complaints today.         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 coma  Herpes labialis  High cholesterol  HLD (hyperlipidemia)  HTN (hypertension)  Hypokalemia  Lower extremity edema  OAB (overactive bladder)  Personal history of colonic polyps      Comment:  s/p polypectomy - Dr Matheus Alba  RLS (restless legs syndrome)  Sciatica  Sleep apnea  Sleep disorder  Spondylosis of lumbosacral spine with radiculopathy  Urinary incontinence, mixed  Vitamin D deficiency     Past Surgical History:   Procedure Laterality Date    APPENDECTOMY      COLONOSCOPY W/ BIOPSIES AND POLYPECTOMY  04/27/2022    Dr Matheus Alba    EPIDURAL STEROID INJECTION INTO LUMBAR SPINE      Dr Lorelei Schmidt    ESOPHAGOGASTRODUODENOSCOPY  04/27/2022    Dr Matheus Alba    ESOPHAGOGASTRODUODENOSCOPY  12/09/2019    Dr Matheus Alba    LIVER BIOPSY      LUMBAR FUSION  2010    Fused L4-5    OPEN REDUCTION AND INTERNAL FIXATION (ORIF) OF INJURY OF WRIST Right     RADIOFREQUENCY ABLATION OF LUMBAR MEDIAL BRANCH NERVE AT SINGLE LEVEL Bilateral 09/09/2022    Dr Phillip Clement MD    SPINE SURGERY      TONSILLECTOMY      TOTAL KNEE ARTHROPLASTY Left 2020       Review of patient's allergies indicates:   Allergen Reactions    Latex Rash    Adhesive tape-silicones Blisters    Ciprofloxacin        Outpatient Medications Marked as Taking for the 2/17/23 encounter (Office Visit) with Kai Back,    Medication Sig Dispense Refill    albuterol (PROVENTIL/VENTOLIN HFA) 90 mcg/actuation inhaler Inhale 1-2 puffs into the lungs every 6 (six) hours as  needed for Wheezing or Shortness of Breath. Rescue 1 g 0    betamethasone valerate 0.1% (VALISONE) 0.1 % Crea Apply topically once daily. 45 g 3    calcium carbonate/vitamin D3 (CALTRATE 600 PLUS D ORAL) Take 1 tablet by mouth 2 (two) times daily.      cetirizine (ZYRTEC) 10 MG tablet Take 10 mg by mouth every evening.      cyclobenzaprine (FLEXERIL) 10 MG tablet Take 10 mg by mouth 3 (three) times daily as needed.      DULoxetine (CYMBALTA) 30 MG capsule Take 60 mg by mouth every morning.      esomeprazole (NEXIUM) 20 MG capsule Take 20 mg by mouth before breakfast.      furosemide (LASIX) 40 MG tablet Take 40 mg by mouth 2 (two) times a day.      HYDROcodone-acetaminophen (NORCO) 7.5-325 mg per tablet Take 1 tablet by mouth 2 (two) times daily as needed for Pain.      losartan-hydrochlorothiazide 50-12.5 mg (HYZAAR) 50-12.5 mg per tablet Take 1 tablet by mouth every morning.      lutein 40 mg Cap Take 1 capsule by mouth Daily.      metFORMIN (GLUCOPHAGE) 500 MG tablet Take 1 tablet (500 mg total) by mouth 2 (two) times daily with meals. 180 tablet 3    metroNIDAZOLE (METROGEL) 0.75 % gel Apply topically 2 (two) times daily. 45 g 2    nirmatrelvir-ritonavir 300 mg (150 mg x 2)-100 mg copackaged tablets (EUA) Take 3 tablets by mouth 2 (two) times daily. Each dose contains 2 nirmatrelvir (pink tablets) and 1 ritonavir (white tablet). Take all 3 tablets together 30 tablet 0    polyethylene glycol (GLYCOLAX) 17 gram PwPk Take 17 g by mouth every evening.      potassium chloride SA (K-DUR,KLOR-CON) 20 MEQ tablet Take 20 mEq by mouth 2 (two) times a day.      pramipexole (MIRAPEX) 0.125 MG tablet Take 0.125 mg by mouth every evening.      pregabalin (LYRICA) 150 MG capsule Take 150 mg by mouth 2 (two) times a day.      simvastatin (ZOCOR) 20 MG tablet Take 20 mg by mouth every evening.      valACYclovir (VALTREX) 500 MG tablet Take by mouth.      vitamin D (VITAMIN D3) 1000 units Tab Take 1,000 Units by mouth Daily.       [DISCONTINUED] mirabegron (MYRBETRIQ) 50 mg Tb24 Take 50 mg by mouth every evening.      [DISCONTINUED] sertraline (ZOLOFT) 50 MG tablet Take 50 mg by mouth.         Social History     Socioeconomic History    Marital status:    Tobacco Use    Smoking status: Former     Packs/day: 0.25     Years: 5.00     Pack years: 1.25     Types: Cigarettes    Smokeless tobacco: Never   Substance and Sexual Activity    Alcohol use: Not Currently    Drug use: Never    Sexual activity: Not Currently     Partners: Male        Family History   Problem Relation Age of Onset    Hypertension Mother     Hyperlipidemia Mother     Stroke Mother     Hypertension Father     Diabetes Father     Hyperlipidemia Father     Heart disease Father     Diabetes Sister     Asthma Brother     Cancer Brother     COPD Brother     Heart disease Brother     Diabetes Mellitus Brother     Diabetes Brother     Heart disease Brother     Diabetes Brother     Heart disease Brother     Diabetes Brother     Diabetes Brother     Heart disease Brother         Patient Care Team:  Kai Back DO as PCP - General (Family Medicine)  Phillip Clement MD as Consulting Physician (Pain Medicine)  Matheus Alba MD as Consulting Physician (Gastroenterology)  Lorelei Schmidt MD as Anesthesiologist (Interventional Pain Medicine)  Yeison Leal MD (Orthopedic Surgery)  [facility] [place] [facility])     Subjective:     Review of Systems   Constitutional:  Negative for chills and fever.   Respiratory:  Positive for cough and sputum production. Negative for shortness of breath.    Cardiovascular:  Negative for chest pain.   Gastrointestinal:  Negative for constipation and diarrhea.   Neurological:  Negative for headaches.   Psychiatric/Behavioral:  The patient does not have insomnia.      See HPI for details  All Other ROS: Negative except as stated in HPI.       Objective:     /80 (BP Location: Left arm, Patient Position: Sitting, BP  "Method: Large (Automatic))   Pulse 101   Temp 97.8 °F (36.6 °C)   Resp 20   Ht 5' 2" (1.575 m)   Wt (!) 145.9 kg (321 lb 9.6 oz)   SpO2 96%   BMI 58.82 kg/m²     Physical Exam  Vitals reviewed.   Constitutional:       General: She is not in acute distress.     Appearance: Normal appearance.   Cardiovascular:      Rate and Rhythm: Normal rate and regular rhythm.      Heart sounds: No murmur heard.    No friction rub. No gallop.   Pulmonary:      Effort: No respiratory distress.      Breath sounds: No wheezing, rhonchi or rales.   Musculoskeletal:         General: No swelling, tenderness or deformity.      Right lower leg: No edema.      Left lower leg: No edema.   Skin:     General: Skin is warm and dry.      Findings: No erythema, lesion or rash.      Comments: Chronic laceration with delayed healing on anterior right lower extremity.    Neurological:      General: No focal deficit present.      Mental Status: She is alert.   Psychiatric:         Mood and Affect: Mood normal.       Assessment/Plan:     1. Wound of right lower extremity, subsequent encounter  -Will order home health with wound care. Recommended aquaphor for freshly healed portions of wound.   2. Primary hypertension  Will have patient fill out BP log and return it to clinic once completed.         Follow up:     Follow up in about 3 months (around 5/17/2023) for Medicare Wellness. In addition to their scheduled follow up, the patient has also been instructed to follow up on as needed basis.         "

## 2023-02-21 PROCEDURE — G0180 MD CERTIFICATION HHA PATIENT: HCPCS | Mod: ,,, | Performed by: FAMILY MEDICINE

## 2023-02-21 PROCEDURE — G0180 PR HOME HEALTH MD CERTIFICATION: ICD-10-PCS | Mod: ,,, | Performed by: FAMILY MEDICINE

## 2023-02-27 PROBLEM — J12.82 PNEUMONIA DUE TO COVID-19 VIRUS: Status: RESOLVED | Noted: 2020-07-22 | Resolved: 2023-02-27

## 2023-02-27 PROBLEM — U07.1 PNEUMONIA DUE TO COVID-19 VIRUS: Status: RESOLVED | Noted: 2020-07-22 | Resolved: 2023-02-27

## 2023-03-06 ENCOUNTER — OFFICE VISIT (OUTPATIENT)
Dept: FAMILY MEDICINE | Facility: CLINIC | Age: 63
End: 2023-03-06
Payer: MEDICARE

## 2023-03-06 VITALS
WEIGHT: 293 LBS | SYSTOLIC BLOOD PRESSURE: 156 MMHG | DIASTOLIC BLOOD PRESSURE: 90 MMHG | RESPIRATION RATE: 18 BRPM | HEART RATE: 91 BPM | OXYGEN SATURATION: 98 % | BODY MASS INDEX: 53.92 KG/M2 | TEMPERATURE: 98 F | HEIGHT: 62 IN

## 2023-03-06 DIAGNOSIS — I10 ESSENTIAL HYPERTENSION: ICD-10-CM

## 2023-03-06 DIAGNOSIS — E66.01 CLASS 3 SEVERE OBESITY DUE TO EXCESS CALORIES WITH SERIOUS COMORBIDITY AND BODY MASS INDEX (BMI) OF 50.0 TO 59.9 IN ADULT: ICD-10-CM

## 2023-03-06 DIAGNOSIS — S81.801D WOUND OF RIGHT LOWER EXTREMITY, SUBSEQUENT ENCOUNTER: ICD-10-CM

## 2023-03-06 DIAGNOSIS — I10 ESSENTIAL HYPERTENSION: Primary | ICD-10-CM

## 2023-03-06 DIAGNOSIS — Z00.00 ROUTINE GENERAL MEDICAL EXAMINATION AT A HEALTH CARE FACILITY: Primary | ICD-10-CM

## 2023-03-06 PROCEDURE — 1160F RVW MEDS BY RX/DR IN RCRD: CPT | Mod: CPTII,,, | Performed by: FAMILY MEDICINE

## 2023-03-06 PROCEDURE — 3077F SYST BP >= 140 MM HG: CPT | Mod: CPTII,,, | Performed by: FAMILY MEDICINE

## 2023-03-06 PROCEDURE — 99214 OFFICE O/P EST MOD 30 MIN: CPT | Mod: ,,, | Performed by: FAMILY MEDICINE

## 2023-03-06 PROCEDURE — 3080F DIAST BP >= 90 MM HG: CPT | Mod: CPTII,,, | Performed by: FAMILY MEDICINE

## 2023-03-06 PROCEDURE — 99214 PR OFFICE/OUTPT VISIT, EST, LEVL IV, 30-39 MIN: ICD-10-PCS | Mod: ,,, | Performed by: FAMILY MEDICINE

## 2023-03-06 PROCEDURE — 3077F PR MOST RECENT SYSTOLIC BLOOD PRESSURE >= 140 MM HG: ICD-10-PCS | Mod: CPTII,,, | Performed by: FAMILY MEDICINE

## 2023-03-06 PROCEDURE — 3008F PR BODY MASS INDEX (BMI) DOCUMENTED: ICD-10-PCS | Mod: CPTII,,, | Performed by: FAMILY MEDICINE

## 2023-03-06 PROCEDURE — 1159F PR MEDICATION LIST DOCUMENTED IN MEDICAL RECORD: ICD-10-PCS | Mod: CPTII,,, | Performed by: FAMILY MEDICINE

## 2023-03-06 PROCEDURE — 1160F PR REVIEW ALL MEDS BY PRESCRIBER/CLIN PHARMACIST DOCUMENTED: ICD-10-PCS | Mod: CPTII,,, | Performed by: FAMILY MEDICINE

## 2023-03-06 PROCEDURE — 3008F BODY MASS INDEX DOCD: CPT | Mod: CPTII,,, | Performed by: FAMILY MEDICINE

## 2023-03-06 PROCEDURE — 1159F MED LIST DOCD IN RCRD: CPT | Mod: CPTII,,, | Performed by: FAMILY MEDICINE

## 2023-03-06 PROCEDURE — 3080F PR MOST RECENT DIASTOLIC BLOOD PRESSURE >= 90 MM HG: ICD-10-PCS | Mod: CPTII,,, | Performed by: FAMILY MEDICINE

## 2023-03-06 RX ORDER — LOSARTAN POTASSIUM 25 MG/1
25 TABLET ORAL DAILY
Qty: 90 TABLET | Refills: 3 | Status: SHIPPED | OUTPATIENT
Start: 2023-03-06 | End: 2023-08-07 | Stop reason: SDUPTHER

## 2023-03-06 RX ORDER — LOSARTAN POTASSIUM AND HYDROCHLOROTHIAZIDE 12.5; 5 MG/1; MG/1
1 TABLET ORAL EVERY MORNING
Qty: 90 TABLET | Refills: 1 | Status: SHIPPED | OUTPATIENT
Start: 2023-03-06 | End: 2023-08-21

## 2023-03-06 NOTE — PROGRESS NOTES
Patient ID: [id number]     Chief Complaint: Follow-up        HPI:     Kimberly Agarwal is a 62 y.o. female here today for Follow-up for HTN and chronic would on right lower extremity. Chronic wound is doing well. Applying medical honey and aquaphor. Sister passed away recently.         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 coma  Herpes labialis  High cholesterol  HLD (hyperlipidemia)  HTN (hypertension)  Hypokalemia  Lower extremity edema  OAB (overactive bladder)  Personal history of colonic polyps      Comment:  s/p polypectomy - Dr Matheus Alba  RLS (restless legs syndrome)  Sciatica  Sleep apnea  Sleep disorder  Spondylosis of lumbosacral spine with radiculopathy  Urinary incontinence, mixed  Vitamin D deficiency     Past Surgical History:   Procedure Laterality Date    APPENDECTOMY      COLONOSCOPY W/ BIOPSIES AND POLYPECTOMY  04/27/2022    Dr Matheus Alba    EPIDURAL STEROID INJECTION INTO LUMBAR SPINE      Dr Lorelei Schmidt    ESOPHAGOGASTRODUODENOSCOPY  04/27/2022    Dr Matheus Alba    ESOPHAGOGASTRODUODENOSCOPY  12/09/2019    Dr Matheus Alba    LIVER BIOPSY      LUMBAR FUSION  2010    Fused L4-5    OPEN REDUCTION AND INTERNAL FIXATION (ORIF) OF INJURY OF WRIST Right     RADIOFREQUENCY ABLATION OF LUMBAR MEDIAL BRANCH NERVE AT SINGLE LEVEL Bilateral 09/09/2022    Dr Phillip Clement MD    SPINE SURGERY      TONSILLECTOMY      TOTAL KNEE ARTHROPLASTY Left 2020       Review of patient's allergies indicates:   Allergen Reactions    Latex Rash    Adhesive tape-silicones Blisters    Ciprofloxacin        Outpatient Medications Marked as Taking for the 3/6/23 encounter (Office Visit) with Kai Back,    Medication Sig Dispense Refill    albuterol (PROVENTIL/VENTOLIN HFA) 90 mcg/actuation inhaler Inhale 1-2 puffs into the lungs every 6 (six) hours as needed for  Wheezing or Shortness of Breath. Rescue 1 g 0    betamethasone valerate 0.1% (VALISONE) 0.1 % Crea Apply topically once daily. 45 g 3    calcium carbonate/vitamin D3 (CALTRATE 600 PLUS D ORAL) Take 1 tablet by mouth 2 (two) times daily.      cetirizine (ZYRTEC) 10 MG tablet Take 10 mg by mouth every evening.      cyclobenzaprine (FLEXERIL) 10 MG tablet Take 10 mg by mouth 3 (three) times daily as needed.      DULoxetine (CYMBALTA) 30 MG capsule Take 60 mg by mouth every morning.      esomeprazole (NEXIUM) 20 MG capsule Take 20 mg by mouth before breakfast.      furosemide (LASIX) 40 MG tablet Take 40 mg by mouth 2 (two) times a day.      HYDROcodone-acetaminophen (NORCO) 7.5-325 mg per tablet Take 1 tablet by mouth 2 (two) times daily as needed for Pain.      losartan-hydrochlorothiazide 50-12.5 mg (HYZAAR) 50-12.5 mg per tablet Take 1 tablet by mouth every morning.      lutein 40 mg Cap Take 1 capsule by mouth Daily.      metFORMIN (GLUCOPHAGE) 500 MG tablet Take 1 tablet (500 mg total) by mouth 2 (two) times daily with meals. 180 tablet 3    metroNIDAZOLE (METROGEL) 0.75 % gel APPLY TOPICALLY 2 (TWO) TIMES DAILY. 45 g 2    mirabegron (MYRBETRIQ) 50 mg Tb24 Take 1 tablet (50 mg total) by mouth every evening. 30 tablet 5    nirmatrelvir-ritonavir 300 mg (150 mg x 2)-100 mg copackaged tablets (EUA) Take 3 tablets by mouth 2 (two) times daily. Each dose contains 2 nirmatrelvir (pink tablets) and 1 ritonavir (white tablet). Take all 3 tablets together 30 tablet 0    polyethylene glycol (GLYCOLAX) 17 gram PwPk Take 17 g by mouth every evening.      potassium chloride SA (K-DUR,KLOR-CON) 20 MEQ tablet Take 20 mEq by mouth 2 (two) times a day.      pramipexole (MIRAPEX) 0.125 MG tablet Take 0.125 mg by mouth every evening.      pregabalin (LYRICA) 150 MG capsule Take 150 mg by mouth 2 (two) times a day.      simvastatin (ZOCOR) 20 MG tablet Take 20 mg by mouth every evening.      valACYclovir (VALTREX) 500 MG tablet Take  "by mouth.      vitamin D (VITAMIN D3) 1000 units Tab Take 1,000 Units by mouth Daily.         Social History     Socioeconomic History    Marital status:    Tobacco Use    Smoking status: Former     Packs/day: 0.25     Years: 5.00     Pack years: 1.25     Types: Cigarettes    Smokeless tobacco: Never   Substance and Sexual Activity    Alcohol use: Not Currently    Drug use: Never    Sexual activity: Not Currently     Partners: Male        Family History   Problem Relation Age of Onset    Hypertension Mother     Hyperlipidemia Mother     Stroke Mother     Hypertension Father     Diabetes Father     Hyperlipidemia Father     Heart disease Father     Diabetes Sister     Pancreatic cancer Sister 68        Cause of death    Liver cancer Sister 68        Cause of death    Asthma Brother     Cancer Brother     COPD Brother     Heart disease Brother     Diabetes Mellitus Brother     Diabetes Brother     Heart disease Brother     Diabetes Brother     Heart disease Brother     Diabetes Brother     Diabetes Brother     Heart disease Brother         Patient Care Team:  Kai Back DO as PCP - General (Family Medicine)  Phillip Clement MD as Consulting Physician (Pain Medicine)  Matheus Alba MD as Consulting Physician (Gastroenterology)  Lorelei Schmidt MD as Anesthesiologist (Interventional Pain Medicine)  Yeison Leal MD (Orthopedic Surgery)  [facility])     Subjective:     Review of Systems   Constitutional:  Negative for fever.   Respiratory:  Negative for shortness of breath.    Cardiovascular:  Negative for chest pain.   Musculoskeletal:  Positive for myalgias.   Neurological:  Negative for dizziness and headaches.     See HPI for details  All Other ROS: Negative except as stated in HPI.       Objective:     BP (!) 156/90   Pulse 91   Temp 98.2 °F (36.8 °C) (Tympanic)   Resp 18   Ht 5' 1.81" (1.57 m)   Wt (!) 146.4 kg (322 lb 12.8 oz)   SpO2 98%   BMI " 59.40 kg/m²     Physical Exam  Vitals reviewed.   Constitutional:       General: She is not in acute distress.     Appearance: Normal appearance.   Cardiovascular:      Rate and Rhythm: Normal rate and regular rhythm.      Heart sounds: No murmur heard.    No friction rub. No gallop.   Pulmonary:      Effort: No respiratory distress.      Breath sounds: No wheezing, rhonchi or rales.   Musculoskeletal:         General: No swelling, tenderness or deformity.      Right lower leg: Edema present.      Left lower leg: Edema present.   Skin:     General: Skin is warm and dry.      Findings: Lesion present. No rash.   Neurological:      General: No focal deficit present.      Mental Status: She is alert.   Psychiatric:         Mood and Affect: Mood normal.       Assessment/Plan:     1. Essential hypertension  -     losartan (COZAAR) 25 MG tablet; Take 1 tablet (25 mg total) by mouth once daily.  Dispense: 90 tablet; Refill: 3    2. Wound of right lower extremity, subsequent encounter  -improving, healing well. Continue current management.   3. Class 3 severe obesity due to excess calories with serious comorbidity and body mass index (BMI) of 50.0 to 59.9 in adult  -Advised patient to consider bariatric surgery. Will discuss further at next visit.   4. Body mass index (BMI) 50.0-59.9, adult          Follow up:     Follow up if symptoms worsen or fail to improve. In addition to their scheduled follow up, the patient has also been instructed to follow up on as needed basis.

## 2023-03-13 ENCOUNTER — EXTERNAL HOME HEALTH (OUTPATIENT)
Dept: HOME HEALTH SERVICES | Facility: HOSPITAL | Age: 63
End: 2023-03-13
Payer: MEDICARE

## 2023-03-21 NOTE — PROGRESS NOTES
Patient, Kimberly Agarwal (MRN #[id number]), presented with a recorded BMI of 58.82 kg/m^2 consistent with the definition of morbid obesity (ICD-10 E66.01). The patient's morbid obesity was monitored, evaluated, addressed and/or treated. This addendum to the medical record is made on 03/21/2023.

## 2023-04-04 DIAGNOSIS — E87.6 HYPOKALEMIA: Primary | ICD-10-CM

## 2023-04-04 RX ORDER — POTASSIUM CHLORIDE 20 MEQ/1
20 TABLET, EXTENDED RELEASE ORAL 2 TIMES DAILY
Qty: 90 TABLET | Refills: 1 | Status: SHIPPED | OUTPATIENT
Start: 2023-04-04 | End: 2023-06-15

## 2023-04-17 ENCOUNTER — PATIENT MESSAGE (OUTPATIENT)
Dept: ADMINISTRATIVE | Facility: HOSPITAL | Age: 63
End: 2023-04-17
Payer: MEDICARE

## 2023-05-01 ENCOUNTER — PATIENT MESSAGE (OUTPATIENT)
Dept: ADMINISTRATIVE | Facility: HOSPITAL | Age: 63
End: 2023-05-01
Payer: MEDICARE

## 2023-05-01 DIAGNOSIS — E78.00 PURE HYPERCHOLESTEROLEMIA: ICD-10-CM

## 2023-05-01 DIAGNOSIS — L71.9 ROSACEA: ICD-10-CM

## 2023-05-01 DIAGNOSIS — G25.81 RESTLESS LEGS: Primary | ICD-10-CM

## 2023-05-01 DIAGNOSIS — I10 ESSENTIAL HYPERTENSION: ICD-10-CM

## 2023-05-02 RX ORDER — MINERAL OIL
180 ENEMA (ML) RECTAL NIGHTLY
COMMUNITY
End: 2023-07-03

## 2023-05-02 RX ORDER — SIMVASTATIN 20 MG/1
20 TABLET, FILM COATED ORAL NIGHTLY
Qty: 90 TABLET | Refills: 0 | Status: SHIPPED | OUTPATIENT
Start: 2023-05-02 | End: 2023-08-01

## 2023-05-02 RX ORDER — FUROSEMIDE 40 MG/1
40 TABLET ORAL 2 TIMES DAILY
Qty: 180 TABLET | Refills: 0 | Status: SHIPPED | OUTPATIENT
Start: 2023-05-02 | End: 2023-08-01

## 2023-05-02 RX ORDER — METRONIDAZOLE 7.5 MG/G
GEL TOPICAL 2 TIMES DAILY
Qty: 135 G | Refills: 0 | Status: SHIPPED | OUTPATIENT
Start: 2023-05-02

## 2023-05-02 RX ORDER — PRAMIPEXOLE DIHYDROCHLORIDE 0.12 MG/1
0.12 TABLET ORAL NIGHTLY
Qty: 90 TABLET | Refills: 0 | Status: SHIPPED | OUTPATIENT
Start: 2023-05-02 | End: 2023-08-01

## 2023-05-03 RX ORDER — CYCLOBENZAPRINE HCL 10 MG
10 TABLET ORAL 3 TIMES DAILY PRN
Qty: 270 TABLET | Refills: 0 | Status: SHIPPED | OUTPATIENT
Start: 2023-05-03 | End: 2023-08-01

## 2023-05-04 ENCOUNTER — ANESTHESIA EVENT (OUTPATIENT)
Dept: SURGERY | Facility: HOSPITAL | Age: 63
End: 2023-05-04
Payer: MEDICARE

## 2023-05-04 NOTE — ANESTHESIA PREPROCEDURE EVALUATION
05/04/2023  Kimberly Agarwal is a 62 y.o., female.      Pre-op Assessment    I have reviewed the Patient Summary Reports.     I have reviewed the Nursing Notes. I have reviewed the NPO Status.   I have reviewed the Medications.     Review of Systems  Anesthesia Hx:  Denies Family Hx of Anesthesia complications.   Denies Personal Hx of Anesthesia complications.   Hematology/Oncology:  Hematology Normal   Oncology Normal     EENT/Dental:EENT/Dental Normal   Cardiovascular:   Hypertension, well controlled    Pulmonary:   Sleep Apnea    Renal/:  Renal/ Normal     Hepatic/GI:   GERD Liver Disease, Hepatitis    Musculoskeletal:   Arthritis     Neurological:   Neuromuscular Disease,    Psych:   Psychiatric History          Physical Exam  General: Cooperative, Alert and Oriented    Airway:  Mallampati: II   Mouth Opening: Normal  TM Distance: Normal  Tongue: Normal  Neck ROM: Normal ROM    Dental:  Intact        Anesthesia Plan  Type of Anesthesia, risks & benefits discussed:    Anesthesia Type: MAC  Intra-op Monitoring Plan: Standard ASA Monitors  Post Op Pain Control Plan:   (medical reason for not using multimodal pain management)  Induction:  IV  Informed Consent: Informed consent signed with the Patient and all parties understand the risks and agree with anesthesia plan.  All questions answered. Patient consented to blood products? Yes  ASA Score: 3    Ready For Surgery From Anesthesia Perspective.     .

## 2023-05-05 ENCOUNTER — ANESTHESIA (OUTPATIENT)
Dept: SURGERY | Facility: HOSPITAL | Age: 63
End: 2023-05-05
Payer: MEDICARE

## 2023-05-05 ENCOUNTER — HOSPITAL ENCOUNTER (OUTPATIENT)
Facility: HOSPITAL | Age: 63
Discharge: HOME | End: 2023-05-05
Attending: ANESTHESIOLOGY | Admitting: ANESTHESIOLOGY
Payer: MEDICARE

## 2023-05-05 DIAGNOSIS — M25.561 CHRONIC PAIN OF BOTH KNEES: ICD-10-CM

## 2023-05-05 DIAGNOSIS — G89.29 CHRONIC PAIN OF BOTH KNEES: ICD-10-CM

## 2023-05-05 DIAGNOSIS — M25.562 CHRONIC PAIN OF BOTH KNEES: ICD-10-CM

## 2023-05-05 LAB — POCT GLUCOSE: 103 MG/DL (ref 70–110)

## 2023-05-05 PROCEDURE — D9220A PRA ANESTHESIA: ICD-10-PCS | Mod: ,,, | Performed by: NURSE ANESTHETIST, CERTIFIED REGISTERED

## 2023-05-05 PROCEDURE — D9220A PRA ANESTHESIA: Mod: ,,, | Performed by: NURSE ANESTHETIST, CERTIFIED REGISTERED

## 2023-05-05 PROCEDURE — 63600175 PHARM REV CODE 636 W HCPCS: Performed by: ANESTHESIOLOGY

## 2023-05-05 PROCEDURE — 64454 NJX AA&/STRD GNCLR NRV BRNCH: CPT | Mod: LT | Performed by: ANESTHESIOLOGY

## 2023-05-05 PROCEDURE — 37000008 HC ANESTHESIA 1ST 15 MINUTES: Performed by: ANESTHESIOLOGY

## 2023-05-05 PROCEDURE — 25000003 PHARM REV CODE 250: Performed by: ANESTHESIOLOGY

## 2023-05-05 PROCEDURE — 37000009 HC ANESTHESIA EA ADD 15 MINS: Performed by: ANESTHESIOLOGY

## 2023-05-05 PROCEDURE — 63600175 PHARM REV CODE 636 W HCPCS

## 2023-05-05 PROCEDURE — 63600175 PHARM REV CODE 636 W HCPCS: Performed by: NURSE ANESTHETIST, CERTIFIED REGISTERED

## 2023-05-05 RX ORDER — LIDOCAINE HYDROCHLORIDE 20 MG/ML
INJECTION, SOLUTION EPIDURAL; INFILTRATION; INTRACAUDAL; PERINEURAL
Status: DISCONTINUED | OUTPATIENT
Start: 2023-05-05 | End: 2023-05-05 | Stop reason: HOSPADM

## 2023-05-05 RX ORDER — MIDAZOLAM HYDROCHLORIDE 1 MG/ML
INJECTION INTRAMUSCULAR; INTRAVENOUS
Status: DISCONTINUED | OUTPATIENT
Start: 2023-05-05 | End: 2023-05-05

## 2023-05-05 RX ORDER — SODIUM CHLORIDE, SODIUM LACTATE, POTASSIUM CHLORIDE, CALCIUM CHLORIDE 600; 310; 30; 20 MG/100ML; MG/100ML; MG/100ML; MG/100ML
INJECTION, SOLUTION INTRAVENOUS CONTINUOUS
Status: ACTIVE | OUTPATIENT
Start: 2023-05-05

## 2023-05-05 RX ORDER — BUPIVACAINE HYDROCHLORIDE 5 MG/ML
INJECTION, SOLUTION EPIDURAL; INTRACAUDAL
Status: DISCONTINUED | OUTPATIENT
Start: 2023-05-05 | End: 2023-05-05 | Stop reason: HOSPADM

## 2023-05-05 RX ORDER — FENTANYL CITRATE 50 UG/ML
INJECTION, SOLUTION INTRAMUSCULAR; INTRAVENOUS
Status: DISCONTINUED | OUTPATIENT
Start: 2023-05-05 | End: 2023-05-05

## 2023-05-05 RX ADMIN — SODIUM CHLORIDE, POTASSIUM CHLORIDE, SODIUM LACTATE AND CALCIUM CHLORIDE: 600; 310; 30; 20 INJECTION, SOLUTION INTRAVENOUS at 07:05

## 2023-05-05 RX ADMIN — MIDAZOLAM HYDROCHLORIDE 1 MG: 1 INJECTION INTRAMUSCULAR; INTRAVENOUS at 08:05

## 2023-05-05 RX ADMIN — MIDAZOLAM HYDROCHLORIDE 4 MG: 1 INJECTION INTRAMUSCULAR; INTRAVENOUS at 08:05

## 2023-05-05 RX ADMIN — FENTANYL CITRATE 100 MCG: 50 INJECTION, SOLUTION INTRAMUSCULAR; INTRAVENOUS at 08:05

## 2023-05-05 NOTE — H&P
Patient presenting for Procedure(s) (LRB):  BLOCK, NERVE, GENICULAR edwina knee (Bilateral)     Patient on Anti-coagulation No    No health changes since previous encounter    Past Medical History:   Diagnosis Date    Achilles tendinitis, left leg     Bursitis of right shoulder     Chronic back pain     Chronic pain     GERD (gastroesophageal reflux disease)     Hepatitis a without hepatic coma     Herpes labialis     High cholesterol     HLD (hyperlipidemia)     HTN (hypertension)     Hypokalemia     Lower extremity edema     OAB (overactive bladder)     Personal history of colonic polyps 04/27/2022    s/p polypectomy - Dr Matheus Alba    RLS (restless legs syndrome)     Sciatica     Sleep apnea     CPAP    Sleep disorder     Spondylosis of lumbosacral spine with radiculopathy     Urinary incontinence, mixed     Vitamin D deficiency      Past Surgical History:   Procedure Laterality Date    APPENDECTOMY      COLONOSCOPY W/ BIOPSIES AND POLYPECTOMY  04/27/2022    Dr Matheus Alba    EPIDURAL STEROID INJECTION INTO LUMBAR SPINE      Dr Lorelei Schmidt    ESOPHAGOGASTRODUODENOSCOPY  04/27/2022    Dr Matheus Alba    ESOPHAGOGASTRODUODENOSCOPY  12/09/2019    Dr Matheus Alba    LIVER BIOPSY      LUMBAR FUSION  2010    Fused L4-5    OPEN REDUCTION AND INTERNAL FIXATION (ORIF) OF INJURY OF WRIST Right     RADIOFREQUENCY ABLATION OF LUMBAR MEDIAL BRANCH NERVE AT SINGLE LEVEL Bilateral 09/09/2022    Dr Phillip Clement MD    SPINE SURGERY      TONSILLECTOMY      TOTAL KNEE ARTHROPLASTY Left 2020     Review of patient's allergies indicates:   Allergen Reactions    Latex Rash    Adhesive tape-silicones Blisters    Ciprofloxacin         Current Facility-Administered Medications on File Prior to Encounter   Medication Dose Route Frequency Provider Last Rate Last Admin    lactated ringers infusion   Intravenous Continuous Jayjay Lagunas, DO 10 mL/hr at 09/09/22 0945 New Bag at 09/09/22 0945     Current Outpatient Medications on File Prior to  Encounter   Medication Sig Dispense Refill    albuterol (PROVENTIL/VENTOLIN HFA) 90 mcg/actuation inhaler Inhale 1-2 puffs into the lungs every 6 (six) hours as needed for Wheezing or Shortness of Breath. Rescue 1 g 0    calcium carbonate/vitamin D3 (CALTRATE 600 PLUS D ORAL) Take 1 tablet by mouth 2 (two) times daily.      DULoxetine (CYMBALTA) 30 MG capsule Take 60 mg by mouth every morning.      esomeprazole (NEXIUM) 20 MG capsule Take 20 mg by mouth before breakfast.      fexofenadine (ALLEGRA) 180 MG tablet Take 180 mg by mouth every evening.      HYDROcodone-acetaminophen (NORCO) 7.5-325 mg per tablet Take 1 tablet by mouth 2 (two) times daily as needed for Pain.      losartan (COZAAR) 25 MG tablet Take 1 tablet (25 mg total) by mouth once daily. (Patient taking differently: Take 25 mg by mouth every morning.) 90 tablet 3    losartan-hydrochlorothiazide 50-12.5 mg (HYZAAR) 50-12.5 mg per tablet Take 1 tablet by mouth every morning. 90 tablet 1    lutein 40 mg Cap Take 1 capsule by mouth every morning.      metFORMIN (GLUCOPHAGE) 500 MG tablet Take 1 tablet (500 mg total) by mouth 2 (two) times daily with meals. 180 tablet 3    mirabegron (MYRBETRIQ) 50 mg Tb24 Take 1 tablet (50 mg total) by mouth every evening. 30 tablet 5    polyethylene glycol (GLYCOLAX) 17 gram PwPk Take 17 g by mouth every evening.      potassium chloride SA (K-DUR,KLOR-CON) 20 MEQ tablet Take 1 tablet (20 mEq total) by mouth 2 (two) times a day. 90 tablet 1    pregabalin (LYRICA) 150 MG capsule Take 150 mg by mouth 2 (two) times a day.      valACYclovir (VALTREX) 500 MG tablet Take 500 mg by mouth daily as needed.      vitamin D (VITAMIN D3) 1000 units Tab Take 1,000 Units by mouth every morning.      betamethasone valerate 0.1% (VALISONE) 0.1 % Crea Apply topically once daily. 45 g 3    cetirizine (ZYRTEC) 10 MG tablet Take 10 mg by mouth every evening.      nirmatrelvir-ritonavir 300 mg (150 mg x 2)-100 mg copackaged tablets (EUA) Take  3 tablets by mouth 2 (two) times daily. Each dose contains 2 nirmatrelvir (pink tablets) and 1 ritonavir (white tablet). Take all 3 tablets together 30 tablet 0    triamcinolone acetonide 0.1% (KENALOG) 0.1 % cream Apply topically 2 (two) times daily. for 14 days 80 g 0        PMHx, PSHx, Allergies, Medications reviewed in epic    ROS negative except pain complaints in HPI    OBJECTIVE:    BP (!) 147/86   Pulse 72   Temp 98.5 °F (36.9 °C) (Oral)   Wt (!) 146.1 kg (322 lb)   SpO2 97%   Breastfeeding No   BMI 59.26 kg/m²     PHYSICAL EXAMINATION:    GENERAL: Well appearing, in no acute distress, alert and oriented x3.  PSYCH:  Mood and affect appropriate.  SKIN: Skin color, texture, turgor normal, no rashes or lesions which will impact the procedure.  CV: RRR with palpation of the radial artery.  PULM: No evidence of respiratory difficulty, symmetric chest rise. Clear to auscultation.  NEURO: Cranial nerves grossly intact.    Plan:    Proceed with procedure as planned Procedure(s) (LRB):  BLOCK, NERVE, GENICULAR edwina knee (Bilateral)    Phillip Clement MD  05/05/2023

## 2023-05-05 NOTE — DISCHARGE SUMMARY
[facility] - Periop Services  Discharge Note  Short Stay    Procedure(s) (LRB):  BLOCK, NERVE, GENICULAR edwina knee (Bilateral)      OUTCOME: Patient tolerated treatment/procedure well without complication and is now ready for discharge.    DISPOSITION: Home or Self Care    FINAL DIAGNOSIS:  Chronic pain of both knees    FOLLOWUP: In clinic    DISCHARGE INSTRUCTIONS:  No discharge procedures on file.      Clinical Reference Documents Added to Patient Instructions         Document    NERVE BLOCKS (ENGLISH)            TIME SPENT ON DISCHARGE: 2 minutes

## 2023-05-05 NOTE — ANESTHESIA POSTPROCEDURE EVALUATION
Anesthesia Post Evaluation    Patient: Kimberly Agarwal    Procedure(s) Performed: Procedure(s) (LRB):  BLOCK, NERVE, GENICULAR edwina knee (Bilateral)    Final Anesthesia Type: MAC      Patient location during evaluation: OPS  Patient participation: Yes- Able to Participate  Level of consciousness: awake and alert  Post-procedure vital signs: reviewed and stable  Pain management: adequate  Airway patency: patent  NIKO mitigation strategies: Multimodal analgesia  PONV status at discharge: No PONV  Anesthetic complications: no      Cardiovascular status: hemodynamically stable  Respiratory status: unassisted, spontaneous ventilation and room air  Hydration status: euvolemic  Follow-up not needed.  Comments: Patient to bed per self                No case tracking events are documented in the log.      Pain/Raimundo Score: No data recorded

## 2023-05-10 VITALS
DIASTOLIC BLOOD PRESSURE: 81 MMHG | OXYGEN SATURATION: 97 % | WEIGHT: 293 LBS | HEART RATE: 73 BPM | TEMPERATURE: 97 F | SYSTOLIC BLOOD PRESSURE: 144 MMHG | BODY MASS INDEX: 59.26 KG/M2 | RESPIRATION RATE: 20 BRPM

## 2023-05-19 ENCOUNTER — OFFICE VISIT (OUTPATIENT)
Dept: FAMILY MEDICINE | Facility: CLINIC | Age: 63
End: 2023-05-19
Payer: MEDICARE

## 2023-05-19 VITALS
DIASTOLIC BLOOD PRESSURE: 84 MMHG | RESPIRATION RATE: 18 BRPM | HEIGHT: 62 IN | TEMPERATURE: 98 F | OXYGEN SATURATION: 98 % | WEIGHT: 293 LBS | SYSTOLIC BLOOD PRESSURE: 136 MMHG | BODY MASS INDEX: 53.92 KG/M2 | HEART RATE: 77 BPM

## 2023-05-19 DIAGNOSIS — E55.9 VITAMIN D DEFICIENCY: ICD-10-CM

## 2023-05-19 DIAGNOSIS — Z12.31 BREAST CANCER SCREENING BY MAMMOGRAM: ICD-10-CM

## 2023-05-19 DIAGNOSIS — Z12.4 CERVICAL CANCER SCREENING: ICD-10-CM

## 2023-05-19 DIAGNOSIS — Z78.0 POST-MENOPAUSE: Chronic | ICD-10-CM

## 2023-05-19 DIAGNOSIS — Z00.00 ROUTINE GENERAL MEDICAL EXAMINATION AT A HEALTH CARE FACILITY: Primary | ICD-10-CM

## 2023-05-19 DIAGNOSIS — E66.01 CLASS 3 SEVERE OBESITY DUE TO EXCESS CALORIES WITH SERIOUS COMORBIDITY AND BODY MASS INDEX (BMI) OF 50.0 TO 59.9 IN ADULT: Chronic | ICD-10-CM

## 2023-05-19 PROBLEM — E66.813 CLASS 3 SEVERE OBESITY DUE TO EXCESS CALORIES WITH SERIOUS COMORBIDITY AND BODY MASS INDEX (BMI) OF 50.0 TO 59.9 IN ADULT: Chronic | Status: ACTIVE | Noted: 2022-10-27

## 2023-05-19 PROCEDURE — 4010F PR ACE/ARB THEARPY RXD/TAKEN: ICD-10-PCS | Mod: CPTII,,, | Performed by: FAMILY MEDICINE

## 2023-05-19 PROCEDURE — 3075F SYST BP GE 130 - 139MM HG: CPT | Mod: CPTII,,, | Performed by: FAMILY MEDICINE

## 2023-05-19 PROCEDURE — 1159F MED LIST DOCD IN RCRD: CPT | Mod: CPTII,,, | Performed by: FAMILY MEDICINE

## 2023-05-19 PROCEDURE — G0439 PR MEDICARE ANNUAL WELLNESS SUBSEQUENT VISIT: ICD-10-PCS | Mod: ,,, | Performed by: FAMILY MEDICINE

## 2023-05-19 PROCEDURE — 1160F PR REVIEW ALL MEDS BY PRESCRIBER/CLIN PHARMACIST DOCUMENTED: ICD-10-PCS | Mod: CPTII,,, | Performed by: FAMILY MEDICINE

## 2023-05-19 PROCEDURE — 3008F BODY MASS INDEX DOCD: CPT | Mod: CPTII,,, | Performed by: FAMILY MEDICINE

## 2023-05-19 PROCEDURE — 3079F DIAST BP 80-89 MM HG: CPT | Mod: CPTII,,, | Performed by: FAMILY MEDICINE

## 2023-05-19 PROCEDURE — 3008F PR BODY MASS INDEX (BMI) DOCUMENTED: ICD-10-PCS | Mod: CPTII,,, | Performed by: FAMILY MEDICINE

## 2023-05-19 PROCEDURE — 1160F RVW MEDS BY RX/DR IN RCRD: CPT | Mod: CPTII,,, | Performed by: FAMILY MEDICINE

## 2023-05-19 PROCEDURE — 3079F PR MOST RECENT DIASTOLIC BLOOD PRESSURE 80-89 MM HG: ICD-10-PCS | Mod: CPTII,,, | Performed by: FAMILY MEDICINE

## 2023-05-19 PROCEDURE — 1159F PR MEDICATION LIST DOCUMENTED IN MEDICAL RECORD: ICD-10-PCS | Mod: CPTII,,, | Performed by: FAMILY MEDICINE

## 2023-05-19 PROCEDURE — G0439 PPPS, SUBSEQ VISIT: HCPCS | Mod: ,,, | Performed by: FAMILY MEDICINE

## 2023-05-19 PROCEDURE — 3075F PR MOST RECENT SYSTOLIC BLOOD PRESS GE 130-139MM HG: ICD-10-PCS | Mod: CPTII,,, | Performed by: FAMILY MEDICINE

## 2023-05-19 PROCEDURE — 4010F ACE/ARB THERAPY RXD/TAKEN: CPT | Mod: CPTII,,, | Performed by: FAMILY MEDICINE

## 2023-05-19 NOTE — PROGRESS NOTES
Patient ID: [id number]     Chief Complaint: Medicare AWV        HPI:     Kimberly Agarwal is a 62 y.o. female here today for a Medicare Wellness. No other complaints today.       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 coma  Herpes labialis  High cholesterol  HLD (hyperlipidemia)  HTN (hypertension)  Hypokalemia  Lower extremity edema  OAB (overactive bladder)  Personal history of colonic polyps      Comment:  s/p polypectomy - Dr Matheus Alba  RLS (restless legs syndrome)  Sciatica  Sleep apnea      Comment:  CPAP  Sleep disorder  Spondylosis of lumbosacral spine with radiculopathy  Urinary incontinence, mixed  Vitamin D deficiency     Past Surgical History:   Procedure Laterality Date    APPENDECTOMY      BLOCK, NERVE, GENICULAR Bilateral 5/5/2023    Procedure: BLOCK, NERVE, GENICULAR edwina knee;  Surgeon: Phillip Clement MD;  Location: [facility];  Service: Pain Management;  Laterality: Bilateral;    COLONOSCOPY W/ BIOPSIES AND POLYPECTOMY  04/27/2022    Dr Matheus Alba    EPIDURAL STEROID INJECTION INTO LUMBAR SPINE      Dr Lorelei Schmidt    ESOPHAGOGASTRODUODENOSCOPY  04/27/2022    Dr Matheus Alba    ESOPHAGOGASTRODUODENOSCOPY  12/09/2019    Dr Matheus Alba    LIVER BIOPSY      LUMBAR FUSION  2010    Fused L4-5    OPEN REDUCTION AND INTERNAL FIXATION (ORIF) OF INJURY OF WRIST Right     RADIOFREQUENCY ABLATION OF LUMBAR MEDIAL BRANCH NERVE AT SINGLE LEVEL Bilateral 09/09/2022    Dr Phillip Clement MD    SPINE SURGERY      TONSILLECTOMY      TOTAL KNEE ARTHROPLASTY Left 2020       Review of patient's allergies indicates:   Allergen Reactions    Latex Rash    Adhesive tape-silicones Blisters    Ciprofloxacin        Outpatient Medications Marked as Taking for the 5/19/23 encounter (Office Visit) with Kai Back, DO   Medication Sig Dispense Refill    albuterol  (PROVENTIL/VENTOLIN HFA) 90 mcg/actuation inhaler Inhale 1-2 puffs into the lungs every 6 (six) hours as needed for Wheezing or Shortness of Breath. Rescue 1 g 0    betamethasone valerate 0.1% (VALISONE) 0.1 % Crea Apply topically once daily. 45 g 3    calcium carbonate/vitamin D3 (CALTRATE 600 PLUS D ORAL) Take 1 tablet by mouth 2 (two) times daily.      cetirizine (ZYRTEC) 10 MG tablet Take 10 mg by mouth every evening.      cyclobenzaprine (FLEXERIL) 10 MG tablet Take 1 tablet (10 mg total) by mouth 3 (three) times daily as needed for Muscle spasms. 270 tablet 0    DULoxetine (CYMBALTA) 30 MG capsule Take 60 mg by mouth every morning.      esomeprazole (NEXIUM) 20 MG capsule Take 20 mg by mouth before breakfast.      fexofenadine (ALLEGRA) 180 MG tablet Take 180 mg by mouth every evening.      furosemide (LASIX) 40 MG tablet Take 1 tablet (40 mg total) by mouth 2 (two) times a day. 180 tablet 0    HYDROcodone-acetaminophen (NORCO) 7.5-325 mg per tablet Take 1 tablet by mouth 2 (two) times daily as needed for Pain.      losartan (COZAAR) 25 MG tablet Take 1 tablet (25 mg total) by mouth once daily. 90 tablet 3    losartan-hydrochlorothiazide 50-12.5 mg (HYZAAR) 50-12.5 mg per tablet Take 1 tablet by mouth every morning. 90 tablet 1    lutein 40 mg Cap Take 1 capsule by mouth every morning.      metFORMIN (GLUCOPHAGE) 500 MG tablet Take 1 tablet (500 mg total) by mouth 2 (two) times daily with meals. 180 tablet 3    metroNIDAZOLE (METROGEL) 0.75 % gel Apply topically 2 (two) times daily. 135 g 0    mirabegron (MYRBETRIQ) 50 mg Tb24 Take 1 tablet (50 mg total) by mouth every evening. 30 tablet 5    polyethylene glycol (GLYCOLAX) 17 gram PwPk Take 17 g by mouth every evening.      potassium chloride SA (K-DUR,KLOR-CON) 20 MEQ tablet Take 1 tablet (20 mEq total) by mouth 2 (two) times a day. 90 tablet 1    pramipexole (MIRAPEX) 0.125 MG tablet Take 1 tablet (0.125 mg total) by mouth every evening. 90 tablet 0     pregabalin (LYRICA) 150 MG capsule Take 150 mg by mouth 2 (two) times a day.      simvastatin (ZOCOR) 20 MG tablet Take 1 tablet (20 mg total) by mouth every evening. 90 tablet 0    triamcinolone acetonide 0.1% (KENALOG) 0.1 % cream Apply topically 2 (two) times daily. for 14 days 80 g 0    valACYclovir (VALTREX) 500 MG tablet Take 500 mg by mouth daily as needed.      vitamin D (VITAMIN D3) 1000 units Tab Take 1,000 Units by mouth every morning.         Social History     Socioeconomic History    Marital status:    Tobacco Use    Smoking status: Former     Packs/day: 0.25     Years: 5.00     Pack years: 1.25     Types: Cigarettes    Smokeless tobacco: Never   Substance and Sexual Activity    Alcohol use: Not Currently    Drug use: Never    Sexual activity: Not Currently     Partners: Male     Social Determinants of Health     Financial Resource Strain: Low Risk     Difficulty of Paying Living Expenses: Not hard at all   Food Insecurity: No Food Insecurity    Worried About Running Out of Food in the Last Year: Never true    Ran Out of Food in the Last Year: Never true   Transportation Needs: No Transportation Needs    Lack of Transportation (Medical): No    Lack of Transportation (Non-Medical): No   Physical Activity: Inactive    Days of Exercise per Week: 0 days    Minutes of Exercise per Session: 0 min   Stress: Stress Concern Present    Feeling of Stress : To some extent   Social Connections: Socially Integrated    Frequency of Communication with Friends and Family: More than three times a week    Frequency of Social Gatherings with Friends and Family: Three times a week    Attends Anab[employer] Services: More than 4 times per year    Active Member of Clubs or Organizations: Yes    Attends Club or Organization Meetings: More than 4 times per year    Marital Status:    Housing Stability: Low Risk     Unable to Pay for Housing in the Last Year: No    Number of Places Lived in the Last Year: 1    Unstable  "Housing in the Last Year: No        Family History   Problem Relation Age of Onset    Hypertension Mother     Hyperlipidemia Mother     Stroke Mother     Hypertension Father     Diabetes Father     Hyperlipidemia Father     Heart disease Father     Diabetes Sister     Pancreatic cancer Sister 68        Cause of death    Liver cancer Sister 68        Cause of death    Asthma Brother     Cancer Brother     COPD Brother     Heart disease Brother     Diabetes Mellitus Brother     Diabetes Brother     Heart disease Brother     Diabetes Brother     Heart disease Brother     Diabetes Brother     Diabetes Brother     Heart disease Brother         Patient Care Team:  Kai Back DO as PCP - General (Family Medicine)  Phillip Clement MD as Consulting Physician (Pain Medicine)  Matheus Alba MD as Consulting Physician (Gastroenterology)  Lorleei Schmidt MD as Anesthesiologist (Interventional Pain Medicine)  Yeison Leal MD (Orthopedic Surgery)  [facility] [place] [facility])       Subjective:     Review of Systems   Constitutional:  Negative for chills and fever.   Respiratory:  Negative for shortness of breath.    Cardiovascular:  Negative for chest pain.   Gastrointestinal:  Positive for constipation. Negative for diarrhea.   Neurological:  Negative for dizziness and headaches.   Psychiatric/Behavioral:  The patient does not have insomnia.        Patient Reported Health Risk Assessments:       Objective:     /84   Pulse 77   Temp 97.9 °F (36.6 °C) (Tympanic)   Resp 18   Ht 5' 1.81" (1.57 m)   Wt (!) 145.1 kg (319 lb 12.8 oz)   SpO2 98%   BMI 58.85 kg/m²     Physical Exam  Vitals reviewed.   Constitutional:       General: She is not in acute distress.     Appearance: Normal appearance. She is obese.   Cardiovascular:      Rate and Rhythm: Normal rate and regular rhythm.      Heart sounds: No murmur heard.    No friction rub. No gallop.   Pulmonary:      Effort: No respiratory " distress.      Breath sounds: No wheezing, rhonchi or rales.   Musculoskeletal:         General: No swelling, tenderness or deformity.      Right lower leg: No edema.      Left lower leg: No edema.   Skin:     General: Skin is warm and dry.      Findings: No lesion or rash.   Neurological:      General: No focal deficit present.      Mental Status: She is alert.   Psychiatric:         Mood and Affect: Mood normal.           Assessment:       ICD-10-CM ICD-9-CM   1. Routine general medical examination at a health care facility  Z00.00 V70.0   2. Breast cancer screening by mammogram  Z12.31 V76.12   3. Class 3 severe obesity due to excess calories with serious comorbidity and body mass index (BMI) of 50.0 to 59.9 in adult  E66.01 278.01    Z68.43 V85.43   4. Vitamin D deficiency  E55.9 268.9   5. Cervical cancer screening  Z12.4 V76.2   6. Post-menopause  Z78.0 V49.81        Plan:       Medicare Annual Wellness and Personalized Prevention Plan:     Fall Risk + Home Safety + Living Situation + Whisper Test + Depression Screen + CAGE + Cognitive Impairment Screen + ADL Screen + Timed Get Up and Go + Nutrition Screen + PAQ Screen + Health Risk Assessment all reviewed.     No flowsheet data found.  Fall Risk Assessment - Outpatient 5/19/2023 2/17/2023 12/5/2022 11/22/2022 11/3/2022 10/27/2022   Mobility Status Ambulatory Ambulatory Ambulatory Ambulatory Ambulatory Ambulatory   Number of falls 1 0 0 0 0 2+   Identified as fall risk 0 0 0 0 0 1                             Alcohol/Tobacco Use - Stressed importance of smoking cessation and limiting alcohol intake.  CVD Risk Factors - Reviewed  Obesity/Physical Activity -  Encouraged daily 30 minute physical activity x 5 days per week.    Opioid Screening: Patient medication list reviewed, patient is taking prescription opioids. Patient is not using additional opioids than prescribed. Patient is at low risk of substance abuse based on this opioid use history.        Health  Maintenance Topics with due status: Not Due       Topic Last Completion Date    TETANUS VACCINE 10/22/2022    Hemoglobin A1c (Diabetic Prevention Screening) 10/31/2022    Lipid Panel 10/31/2022        Cervical Cancer Screening -Will refer to GYN   Breast Cancer Screening - Will order today   Colon Cancer Screening - Will obtain records  Osteoporosis Screening - Will order today  Eye Exam - Recommend annually  Dental Exam - Recommend biannually  Vaccinations -   Immunization History   Administered Date(s) Administered    COVID-19, MRNA, LN-S, PF (Pfizer) (Gray Cap) 03/18/2021, 04/09/2021    COVID-19, MRNA, LN-S, PF (Pfizer) (Purple Cap) 03/18/2021, 04/09/2021, 11/18/2021    Influenza - Quadrivalent 10/09/2018, 09/20/2019    Influenza - Quadrivalent - PF *Preferred* (6 months and older) 10/12/2005, 11/12/2020, 10/11/2021, 10/08/2022    Influenza A (H1N1) 2009 Monovalent - IM 10/19/2009    Td - PF (ADULT) 10/12/2005    Tdap 10/22/2022        Advance Care Planning   Advanced Care Planning: I offered to discuss Advanced Care Planning, which consists of how you would like to be cared for as you end the near of your natural life.  This includes how to pick a person who would make decisions for you if you were unable to make them for yourself, which is called a Medical Power of . These discussions include what kind of decisions you will make regarding life sustaining measures, such as ventilators and feeding tubes, when faced with a life limiting illness recorded on a living will that they will need to know.  Spent 20 minutes with the patient/family on the importance of Advanced Care Planning.          1. Routine general medical examination at a health care facility  - Mammo Digital Screening Bilat w/ [facility]; Future    2. Breast cancer screening by mammogram  - Mammo Digital Screening Bilat w/ [facility]; Future    3. Class 3 severe obesity due to excess calories with serious comorbidity and body mass index (BMI) of 50.0 to  59.9 in adult    4. Vitamin D deficiency    5. Cervical cancer screening  - Ambulatory referral/consult to Gynecology; Future    6. Post-menopause  - DXA Bone Density Axial Skeleton 1 or more sites; Future        Medication List with Changes/Refills   Current Medications    ALBUTEROL (PROVENTIL/VENTOLIN HFA) 90 MCG/ACTUATION INHALER    Inhale 1-2 puffs into the lungs every 6 (six) hours as needed for Wheezing or Shortness of Breath. Rescue       Start Date: 2/8/2023  End Date: 5/19/2023    BETAMETHASONE VALERATE 0.1% (VALISONE) 0.1 % CREA    Apply topically once daily.       Start Date: 12/28/2022End Date: --    CALCIUM CARBONATE/VITAMIN D3 (CALTRATE 600 PLUS D ORAL)    Take 1 tablet by mouth 2 (two) times daily.       Start Date: --        End Date: --    CETIRIZINE (ZYRTEC) 10 MG TABLET    Take 10 mg by mouth every evening.       Start Date: 5/12/2021 End Date: --    CYCLOBENZAPRINE (FLEXERIL) 10 MG TABLET    Take 1 tablet (10 mg total) by mouth 3 (three) times daily as needed for Muscle spasms.       Start Date: 5/3/2023  End Date: 8/1/2023    DULOXETINE (CYMBALTA) 30 MG CAPSULE    Take 60 mg by mouth every morning.       Start Date: 5/12/2021 End Date: --    ESOMEPRAZOLE (NEXIUM) 20 MG CAPSULE    Take 20 mg by mouth before breakfast.       Start Date: --        End Date: --    FEXOFENADINE (ALLEGRA) 180 MG TABLET    Take 180 mg by mouth every evening.       Start Date: --        End Date: --    FUROSEMIDE (LASIX) 40 MG TABLET    Take 1 tablet (40 mg total) by mouth 2 (two) times a day.       Start Date: 5/2/2023  End Date: --    HYDROCODONE-ACETAMINOPHEN (NORCO) 7.5-325 MG PER TABLET    Take 1 tablet by mouth 2 (two) times daily as needed for Pain.       Start Date: --        End Date: --    LOSARTAN (COZAAR) 25 MG TABLET    Take 1 tablet (25 mg total) by mouth once daily.       Start Date: 3/6/2023  End Date: 3/5/2024    LOSARTAN-HYDROCHLOROTHIAZIDE 50-12.5 MG (HYZAAR) 50-12.5 MG PER TABLET    Take 1 tablet by  mouth every morning.       Start Date: 3/6/2023  End Date: --    LUTEIN 40 MG CAP    Take 1 capsule by mouth every morning.       Start Date: --        End Date: --    METFORMIN (GLUCOPHAGE) 500 MG TABLET    Take 1 tablet (500 mg total) by mouth 2 (two) times daily with meals.       Start Date: 10/27/2022End Date: 10/27/2023    METRONIDAZOLE (METROGEL) 0.75 % GEL    Apply topically 2 (two) times daily.       Start Date: 5/2/2023  End Date: --    MIRABEGRON (MYRBETRIQ) 50 MG TB24    Take 1 tablet (50 mg total) by mouth every evening.       Start Date: 2/17/2023 End Date: --    POLYETHYLENE GLYCOL (GLYCOLAX) 17 GRAM PWPK    Take 17 g by mouth every evening.       Start Date: --        End Date: --    POTASSIUM CHLORIDE SA (K-DUR,KLOR-CON) 20 MEQ TABLET    Take 1 tablet (20 mEq total) by mouth 2 (two) times a day.       Start Date: 4/4/2023  End Date: --    PRAMIPEXOLE (MIRAPEX) 0.125 MG TABLET    Take 1 tablet (0.125 mg total) by mouth every evening.       Start Date: 5/2/2023  End Date: --    PREGABALIN (LYRICA) 150 MG CAPSULE    Take 150 mg by mouth 2 (two) times a day.       Start Date: 5/12/2021 End Date: --    SIMVASTATIN (ZOCOR) 20 MG TABLET    Take 1 tablet (20 mg total) by mouth every evening.       Start Date: 5/2/2023  End Date: --    TRIAMCINOLONE ACETONIDE 0.1% (KENALOG) 0.1 % CREAM    Apply topically 2 (two) times daily. for 14 days       Start Date: 11/4/2022 End Date: 5/19/2023    VALACYCLOVIR (VALTREX) 500 MG TABLET    Take 500 mg by mouth daily as needed.       Start Date: 12/28/2022End Date: --    VITAMIN D (VITAMIN D3) 1000 UNITS TAB    Take 1,000 Units by mouth every morning.       Start Date: --        End Date: --   Discontinued Medications    NIRMATRELVIR-RITONAVIR 300 MG (150 MG X 2)-100 MG COPACKAGED TABLETS (EUA)    Take 3 tablets by mouth 2 (two) times daily. Each dose contains 2 nirmatrelvir (pink tablets) and 1 ritonavir (white tablet). Take all 3 tablets together       Start Date:  2/8/2023  End Date: 5/19/2023          The patient's Health Maintenance was reviewed and the following appears to be due at this time:   Health Maintenance Due   Topic Date Due    Cervical Cancer Screening  Never done    Colorectal Cancer Screening  Never done    Shingles Vaccine (1 of 2) Never done    Mammogram  03/09/2023           Follow up in about 6 months (around 11/19/2023) for Follow up. In addition to their scheduled follow up, the patient has also been instructed to follow up on as needed basis.     Provided patient with a 5-10 year written screening schedule and personal prevention plan. Recommendations were developed using the USPSTF age appropriate recommendations. Education, counseling, and referrals were provided as needed. After Visit Summary printed and given to patient which includes a list of additional screenings\tests needed.

## 2023-05-19 NOTE — LETTER
AUTHORIZATION FOR RELEASE OF   CONFIDENTIAL INFORMATION    Dear Dr Alba,    We are seeing Kimberly Agarwal, date of birth 1960, in the clinic at [facility]. Kai Back DO is the patient's PCP. Kimberly Agarwal has an outstanding lab/procedure at the time we reviewed her chart. In order to help keep her health information updated, she has authorized us to request the following medical record(s):        (  )  MAMMOGRAM                                      (X)  COLONOSCOPY REPORT & PATH      (  )  PAP SMEAR                                          (  )  OUTSIDE LAB RESULTS     (  )  DEXA SCAN                                          (  )  EYE EXAM            (  )  FOOT EXAM                                          (  )  ENTIRE RECORD     (  )  OUTSIDE IMMUNIZATIONS                 (  )  _______________         Please fax records to [facility], Quinn Quebodeaux, DO, [phone].              Patient Name: Kimberly Agarwal  : 1960  Patient Phone #: [phone]

## 2023-05-19 NOTE — LETTER
AUTHORIZATION FOR RELEASE OF   CONFIDENTIAL INFORMATION    Dear Dr Chapo Steele,    We are seeing Kimberly Agarwal, date of birth 1960, in the clinic at [facility]. Kai Back DO is the patient's PCP. Kimberly Agarwal has an outstanding lab/procedure at the time we reviewed her chart. In order to help keep her health information updated, she has authorized us to request the following medical record(s):        (  )  MAMMOGRAM                                      (  )  COLONOSCOPY      (X) LAST PAP SMEAR                                (  )  OUTSIDE LAB RESULTS     (  )  DEXA SCAN                                          (  )  EYE EXAM            (  )  FOOT EXAM                                          (  )  ENTIRE RECORD     (  )  OUTSIDE IMMUNIZATIONS                 (  )  _______________         Please fax records to [facility], Quinn Quebodeaux, DO, [phone].              Patient Name: Kimberly Agarwal  : 1960  Patient Phone #: [phone]

## 2023-05-22 ENCOUNTER — DOCUMENTATION ONLY (OUTPATIENT)
Dept: FAMILY MEDICINE | Facility: CLINIC | Age: 63
End: 2023-05-22
Payer: MEDICARE

## 2023-05-25 ENCOUNTER — HOSPITAL ENCOUNTER (OUTPATIENT)
Dept: RADIOLOGY | Facility: HOSPITAL | Age: 63
Discharge: HOME | End: 2023-05-25
Attending: FAMILY MEDICINE
Payer: MEDICARE

## 2023-05-25 DIAGNOSIS — Z12.31 BREAST CANCER SCREENING BY MAMMOGRAM: ICD-10-CM

## 2023-05-25 DIAGNOSIS — Z00.00 ROUTINE GENERAL MEDICAL EXAMINATION AT A HEALTH CARE FACILITY: ICD-10-CM

## 2023-05-25 PROCEDURE — 77063 BREAST TOMOSYNTHESIS BI: CPT | Mod: 26,,, | Performed by: STUDENT IN AN ORGANIZED HEALTH CARE EDUCATION/TRAINING PROGRAM

## 2023-05-25 PROCEDURE — 77067 SCR MAMMO BI INCL CAD: CPT | Mod: 26,,, | Performed by: STUDENT IN AN ORGANIZED HEALTH CARE EDUCATION/TRAINING PROGRAM

## 2023-05-25 PROCEDURE — 77067 MAMMO DIGITAL SCREENING BILAT WITH TOMO: ICD-10-PCS | Mod: 26,,, | Performed by: STUDENT IN AN ORGANIZED HEALTH CARE EDUCATION/TRAINING PROGRAM

## 2023-05-25 PROCEDURE — 77063 MAMMO DIGITAL SCREENING BILAT WITH TOMO: ICD-10-PCS | Mod: 26,,, | Performed by: STUDENT IN AN ORGANIZED HEALTH CARE EDUCATION/TRAINING PROGRAM

## 2023-05-25 PROCEDURE — 77067 SCR MAMMO BI INCL CAD: CPT | Mod: TC

## 2023-06-01 ENCOUNTER — TELEPHONE (OUTPATIENT)
Dept: FAMILY MEDICINE | Facility: CLINIC | Age: 63
End: 2023-06-01
Payer: MEDICARE

## 2023-06-01 ENCOUNTER — HOSPITAL ENCOUNTER (OUTPATIENT)
Dept: RADIOLOGY | Facility: HOSPITAL | Age: 63
Discharge: HOME | End: 2023-06-01
Attending: FAMILY MEDICINE
Payer: MEDICARE

## 2023-06-01 DIAGNOSIS — Z78.0 POST-MENOPAUSE: ICD-10-CM

## 2023-06-01 PROCEDURE — 77080 DXA BONE DENSITY AXIAL: CPT | Mod: TC

## 2023-06-01 NOTE — TELEPHONE ENCOUNTER
----- Message from Kai Back DO sent at 6/1/2023  2:46 PM CDT -----  I have reviewed the imaging results. There are no significant abnormalities noted.

## 2023-06-15 DIAGNOSIS — E87.6 HYPOKALEMIA: ICD-10-CM

## 2023-06-15 RX ORDER — POTASSIUM CHLORIDE 20 MEQ/1
TABLET, EXTENDED RELEASE ORAL
Qty: 180 TABLET | Refills: 3 | Status: SHIPPED | OUTPATIENT
Start: 2023-06-15

## 2023-06-19 ENCOUNTER — TELEPHONE (OUTPATIENT)
Dept: FAMILY MEDICINE | Facility: CLINIC | Age: 63
End: 2023-06-19
Payer: MEDICARE

## 2023-06-19 NOTE — TELEPHONE ENCOUNTER
----- Message from Flora Markham sent at 6/19/2023  8:20 AM CDT -----  Regarding: LEG INFECTED  Patients right leg had a would a while back that she had home health for and seen doc for, she followed up with rox and he said it was healed looks like  back in February, she said its infected again and has puss since Saturday.. we have no openings, wondering what to do    [phone]

## 2023-06-20 ENCOUNTER — OFFICE VISIT (OUTPATIENT)
Dept: FAMILY MEDICINE | Facility: CLINIC | Age: 63
End: 2023-06-20
Payer: MEDICARE

## 2023-06-20 VITALS
OXYGEN SATURATION: 98 % | RESPIRATION RATE: 18 BRPM | TEMPERATURE: 98 F | HEIGHT: 62 IN | DIASTOLIC BLOOD PRESSURE: 84 MMHG | SYSTOLIC BLOOD PRESSURE: 133 MMHG | WEIGHT: 293 LBS | HEART RATE: 112 BPM | BODY MASS INDEX: 53.92 KG/M2

## 2023-06-20 DIAGNOSIS — N32.81 OVERACTIVE BLADDER: ICD-10-CM

## 2023-06-20 DIAGNOSIS — S81.801D WOUND OF RIGHT LOWER EXTREMITY, SUBSEQUENT ENCOUNTER: Primary | ICD-10-CM

## 2023-06-20 DIAGNOSIS — E11.628 TYPE 2 DIABETES MELLITUS WITH OTHER SKIN COMPLICATION, WITHOUT LONG-TERM CURRENT USE OF INSULIN: ICD-10-CM

## 2023-06-20 PROCEDURE — 3075F PR MOST RECENT SYSTOLIC BLOOD PRESS GE 130-139MM HG: ICD-10-PCS | Mod: CPTII,,, | Performed by: FAMILY MEDICINE

## 2023-06-20 PROCEDURE — 4010F ACE/ARB THERAPY RXD/TAKEN: CPT | Mod: CPTII,,, | Performed by: FAMILY MEDICINE

## 2023-06-20 PROCEDURE — 3079F PR MOST RECENT DIASTOLIC BLOOD PRESSURE 80-89 MM HG: ICD-10-PCS | Mod: CPTII,,, | Performed by: FAMILY MEDICINE

## 2023-06-20 PROCEDURE — 99214 OFFICE O/P EST MOD 30 MIN: CPT | Mod: ,,, | Performed by: FAMILY MEDICINE

## 2023-06-20 PROCEDURE — 1160F PR REVIEW ALL MEDS BY PRESCRIBER/CLIN PHARMACIST DOCUMENTED: ICD-10-PCS | Mod: CPTII,,, | Performed by: FAMILY MEDICINE

## 2023-06-20 PROCEDURE — 3075F SYST BP GE 130 - 139MM HG: CPT | Mod: CPTII,,, | Performed by: FAMILY MEDICINE

## 2023-06-20 PROCEDURE — 3079F DIAST BP 80-89 MM HG: CPT | Mod: CPTII,,, | Performed by: FAMILY MEDICINE

## 2023-06-20 PROCEDURE — 1159F PR MEDICATION LIST DOCUMENTED IN MEDICAL RECORD: ICD-10-PCS | Mod: CPTII,,, | Performed by: FAMILY MEDICINE

## 2023-06-20 PROCEDURE — 4010F PR ACE/ARB THEARPY RXD/TAKEN: ICD-10-PCS | Mod: CPTII,,, | Performed by: FAMILY MEDICINE

## 2023-06-20 PROCEDURE — 3008F BODY MASS INDEX DOCD: CPT | Mod: CPTII,,, | Performed by: FAMILY MEDICINE

## 2023-06-20 PROCEDURE — 3008F PR BODY MASS INDEX (BMI) DOCUMENTED: ICD-10-PCS | Mod: CPTII,,, | Performed by: FAMILY MEDICINE

## 2023-06-20 PROCEDURE — 99214 PR OFFICE/OUTPT VISIT, EST, LEVL IV, 30-39 MIN: ICD-10-PCS | Mod: ,,, | Performed by: FAMILY MEDICINE

## 2023-06-20 PROCEDURE — 1159F MED LIST DOCD IN RCRD: CPT | Mod: CPTII,,, | Performed by: FAMILY MEDICINE

## 2023-06-20 PROCEDURE — 1160F RVW MEDS BY RX/DR IN RCRD: CPT | Mod: CPTII,,, | Performed by: FAMILY MEDICINE

## 2023-06-20 RX ORDER — MUPIROCIN 20 MG/G
OINTMENT TOPICAL 3 TIMES DAILY
Qty: 30 G | Refills: 3 | Status: SHIPPED | OUTPATIENT
Start: 2023-06-20 | End: 2023-07-20

## 2023-06-20 RX ORDER — LANCETS
EACH MISCELLANEOUS
Qty: 200 EACH | Refills: 5 | Status: SHIPPED | OUTPATIENT
Start: 2023-06-20 | End: 2023-07-03

## 2023-06-20 RX ORDER — INSULIN PUMP SYRINGE, 3 ML
EACH MISCELLANEOUS
Qty: 1 EACH | Refills: 0 | Status: SHIPPED | OUTPATIENT
Start: 2023-06-20 | End: 2023-07-24

## 2023-06-20 RX ORDER — OXYBUTYNIN CHLORIDE 5 MG/1
5 TABLET ORAL 3 TIMES DAILY
Qty: 90 TABLET | Refills: 11 | Status: SHIPPED | OUTPATIENT
Start: 2023-06-20 | End: 2023-07-03

## 2023-06-20 NOTE — PROGRESS NOTES
Patient ID: [id number]     Chief Complaint: Wound Infection (Chronic non-healing wound infection to RLE)        HPI:     Kimberly Agarwal is a 63 y.o. female here today for Wound Infection (Chronic non-healing wound infection to RLE) Wound had previously closed and nearly healed. Last Thursday patient went in a private pool and this past Saturday the wound partially returned and began oozing purulent fluid. Denies fevers or re injuring it.         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 coma  Herpes labialis  High cholesterol  HLD (hyperlipidemia)  HTN (hypertension)  Hypokalemia  Lower extremity edema  OAB (overactive bladder)  Personal history of colonic polyps      Comment:  s/p polypectomy - Dr Matheus Alba  RLS (restless legs syndrome)  Sciatica  Sleep apnea      Comment:  CPAP  Sleep disorder  Spondylosis of lumbosacral spine with radiculopathy  Urinary incontinence, mixed  Vitamin D deficiency     Past Surgical History:   Procedure Laterality Date    APPENDECTOMY      BLOCK, NERVE, GENICULAR Bilateral 05/05/2023    Dr Phillip Clement    COLONOSCOPY W/ BIOPSIES AND POLYPECTOMY  04/27/2022    Dr Matheus Alba    EPIDURAL STEROID INJECTION INTO LUMBAR SPINE      Dr Lorelei Schmidt    ESOPHAGOGASTRODUODENOSCOPY  04/27/2022    Dr Matheus Alba    ESOPHAGOGASTRODUODENOSCOPY  12/09/2019    Dr Matheus Alba    LIVER BIOPSY      LUMBAR FUSION  2010    Fused L4-5    OPEN REDUCTION AND INTERNAL FIXATION (ORIF) OF INJURY OF WRIST Right     RADIOFREQUENCY ABLATION OF LUMBAR MEDIAL BRANCH NERVE AT SINGLE LEVEL Bilateral 09/09/2022    Dr Phillip Clement MD    SPINE SURGERY      TONSILLECTOMY      TOTAL KNEE ARTHROPLASTY Left 2020       Review of patient's allergies indicates:   Allergen Reactions    Latex Rash    Adhesive tape-silicones Blisters    Ciprofloxacin        Outpatient  Medications Marked as Taking for the 6/20/23 encounter (Office Visit) with Kai Back, DO   Medication Sig Dispense Refill    albuterol (PROVENTIL/VENTOLIN HFA) 90 mcg/actuation inhaler Inhale 1-2 puffs into the lungs every 6 (six) hours as needed for Wheezing or Shortness of Breath. Rescue 1 g 0    betamethasone valerate 0.1% (VALISONE) 0.1 % Crea Apply topically once daily. 45 g 3    calcium carbonate/vitamin D3 (CALTRATE 600 PLUS D ORAL) Take 1 tablet by mouth 2 (two) times daily.      cetirizine (ZYRTEC) 10 MG tablet Take 10 mg by mouth every evening.      cyclobenzaprine (FLEXERIL) 10 MG tablet Take 1 tablet (10 mg total) by mouth 3 (three) times daily as needed for Muscle spasms. 270 tablet 0    DULoxetine (CYMBALTA) 30 MG capsule Take 60 mg by mouth every morning.      esomeprazole (NEXIUM) 20 MG capsule Take 20 mg by mouth before breakfast.      fexofenadine (ALLEGRA) 180 MG tablet Take 180 mg by mouth every evening.      furosemide (LASIX) 40 MG tablet Take 1 tablet (40 mg total) by mouth 2 (two) times a day. 180 tablet 0    HYDROcodone-acetaminophen (NORCO) 7.5-325 mg per tablet Take 1 tablet by mouth 2 (two) times daily as needed for Pain.      losartan (COZAAR) 25 MG tablet Take 1 tablet (25 mg total) by mouth once daily. 90 tablet 3    losartan-hydrochlorothiazide 50-12.5 mg (HYZAAR) 50-12.5 mg per tablet Take 1 tablet by mouth every morning. 90 tablet 1    lutein 40 mg Cap Take 1 capsule by mouth every morning.      metFORMIN (GLUCOPHAGE) 500 MG tablet Take 1 tablet (500 mg total) by mouth 2 (two) times daily with meals. 180 tablet 3    metroNIDAZOLE (METROGEL) 0.75 % gel Apply topically 2 (two) times daily. 135 g 0    polyethylene glycol (GLYCOLAX) 17 gram PwPk Take 17 g by mouth every evening.      potassium chloride SA (K-DUR,KLOR-CON) 20 MEQ tablet TAKE 1 TABLET TWICE DAILY 180 tablet 3    pramipexole (MIRAPEX) 0.125 MG tablet Take 1 tablet (0.125 mg total) by mouth every evening. 90 tablet 0     pregabalin (LYRICA) 150 MG capsule Take 150 mg by mouth 2 (two) times a day.      simvastatin (ZOCOR) 20 MG tablet Take 1 tablet (20 mg total) by mouth every evening. 90 tablet 0    triamcinolone acetonide 0.1% (KENALOG) 0.1 % cream Apply topically 2 (two) times daily. for 14 days 80 g 0    valACYclovir (VALTREX) 500 MG tablet Take 500 mg by mouth daily as needed.      vitamin D (VITAMIN D3) 1000 units Tab Take 1,000 Units by mouth every morning.      [DISCONTINUED] mirabegron (MYRBETRIQ) 50 mg Tb24 Take 1 tablet (50 mg total) by mouth every evening. 30 tablet 5       Social History     Socioeconomic History    Marital status:    Tobacco Use    Smoking status: Former     Packs/day: 0.25     Years: 5.00     Pack years: 1.25     Types: Cigarettes    Smokeless tobacco: Never   Substance and Sexual Activity    Alcohol use: Not Currently    Drug use: Never    Sexual activity: Not Currently     Partners: Male     Social Determinants of Health     Financial Resource Strain: Low Risk     Difficulty of Paying Living Expenses: Not hard at all   Food Insecurity: No Food Insecurity    Worried About Running Out of Food in the Last Year: Never true    Ran Out of Food in the Last Year: Never true   Transportation Needs: No Transportation Needs    Lack of Transportation (Medical): No    Lack of Transportation (Non-Medical): No   Physical Activity: Inactive    Days of Exercise per Week: 0 days    Minutes of Exercise per Session: 0 min   Stress: Stress Concern Present    Feeling of Stress : To some extent   Social Connections: Socially Integrated    Frequency of Communication with Friends and Family: More than three times a week    Frequency of Social Gatherings with Friends and Family: Three times a week    Attends Mu-ism Services: More than 4 times per year    Active Member of Clubs or Organizations: Yes    Attends Club or Organization Meetings: More than 4 times per year    Marital Status:    Housing Stability:  "Low Risk     Unable to Pay for Housing in the Last Year: No    Number of Places Lived in the Last Year: 1    Unstable Housing in the Last Year: No        Family History   Problem Relation Age of Onset    Hypertension Mother     Hyperlipidemia Mother     Stroke Mother     Hypertension Father     Diabetes Father     Hyperlipidemia Father     Heart disease Father     Diabetes Sister     Pancreatic cancer Sister 68        Cause of death    Liver cancer Sister 68        Cause of death    Asthma Brother     Cancer Brother     COPD Brother     Heart disease Brother     Diabetes Mellitus Brother     Diabetes Brother     Heart disease Brother     Diabetes Brother     Heart disease Brother     Diabetes Brother     Diabetes Brother     Heart disease Brother         Patient Care Team:  Kai Back DO as PCP - General (Family Medicine)  Phillip Clement MD as Consulting Physician (Pain Medicine)  Matheus Alba MD as Consulting Physician (Gastroenterology)  Lorelei Schmidt MD as Anesthesiologist (Interventional Pain Medicine)  Yeison Leal MD (Orthopedic Surgery)  [facility]  Chapo Steele MD (Obstetrics and Gynecology)     Subjective:     Review of Systems   Constitutional:  Negative for chills and fever.   Respiratory:  Negative for shortness of breath.    Cardiovascular:  Negative for chest pain.   Gastrointestinal:  Negative for constipation and diarrhea.   Neurological:  Negative for headaches.   Psychiatric/Behavioral:  The patient does not have insomnia.      See HPI for details  All Other ROS: Negative except as stated in HPI.       Objective:     /84   Pulse (!) 112   Temp 97.5 °F (36.4 °C) (Tympanic)   Resp 18   Ht 5' 1.81" (1.57 m)   Wt (!) 144.1 kg (317 lb 9.6 oz)   SpO2 98%   BMI 58.45 kg/m²     Physical Exam  Vitals reviewed.   Constitutional:       General: She is not in acute distress.     Appearance: Normal appearance.   Cardiovascular:     "  Rate and Rhythm: Normal rate and regular rhythm.      Heart sounds: No murmur heard.    No friction rub. No gallop.   Pulmonary:      Effort: No respiratory distress.      Breath sounds: No wheezing, rhonchi or rales.   Musculoskeletal:         General: No swelling, tenderness or deformity.      Right lower leg: No edema.      Left lower leg: No edema.   Skin:     General: Skin is warm and dry.      Findings: Lesion present. No rash.   Neurological:      General: No focal deficit present.      Mental Status: She is alert.   Psychiatric:         Mood and Affect: Mood normal.       Assessment/Plan:     1. Wound of right lower extremity, subsequent encounter  -     mupirocin (BACTROBAN) 2 % ointment; Apply topically 3 (three) times daily.  Dispense: 30 g; Refill: 3  -     Ambulatory referral/consult to Wound Clinic; Future; Expected date: 06/27/2023    2. Type 2 diabetes mellitus with other skin complication, without long-term current use of insulin  -     blood-glucose meter kit; To check BG 1 times daily, to use with insurance preferred meter  Dispense: 1 each; Refill: 0  -     lancets Misc; To check BG 1 times daily, to use with insurance preferred meter  Dispense: 200 each; Refill: 5  -     blood sugar diagnostic Strp; To check BG 1 times daily, to use with insurance preferred meter  Dispense: 200 each; Refill: 5  -     Lipid Panel; Future; Expected date: 06/20/2023  -     Comprehensive Metabolic Panel; Future; Expected date: 06/20/2023  -     Hemoglobin A1C; Future; Expected date: 06/20/2023    3. Overactive bladder  -     oxybutynin (DITROPAN) 5 MG Tab; Take 1 tablet (5 mg total) by mouth 3 (three) times daily.  Dispense: 90 tablet; Refill: 11          Follow up:     Follow up if symptoms worsen or fail to improve. In addition to their scheduled follow up, the patient has also been instructed to follow up on as needed basis.

## 2023-06-21 ENCOUNTER — LAB VISIT (OUTPATIENT)
Dept: LAB | Facility: HOSPITAL | Age: 63
End: 2023-06-21
Attending: FAMILY MEDICINE
Payer: MEDICARE

## 2023-06-21 DIAGNOSIS — E11.628 TYPE 2 DIABETES MELLITUS WITH OTHER SKIN COMPLICATION, WITHOUT LONG-TERM CURRENT USE OF INSULIN: ICD-10-CM

## 2023-06-21 LAB
ALBUMIN SERPL-MCNC: 3.8 G/DL (ref 3.4–4.8)
ALBUMIN/GLOB SERPL: 1.2 RATIO (ref 1.1–2)
ALP SERPL-CCNC: 88 UNIT/L (ref 40–150)
ALT SERPL-CCNC: 15 UNIT/L (ref 0–55)
AST SERPL-CCNC: 13 UNIT/L (ref 5–34)
BILIRUBIN DIRECT+TOT PNL SERPL-MCNC: 0.6 MG/DL
BUN SERPL-MCNC: 14 MG/DL (ref 9.8–20.1)
CALCIUM SERPL-MCNC: 9.9 MG/DL (ref 8.4–10.2)
CHLORIDE SERPL-SCNC: 109 MMOL/L (ref 98–107)
CHOLEST SERPL-MCNC: 145 MG/DL
CHOLEST/HDLC SERPL: 3 {RATIO} (ref 0–5)
CO2 SERPL-SCNC: 28 MMOL/L (ref 23–31)
CREAT SERPL-MCNC: 0.79 MG/DL (ref 0.55–1.02)
EST. AVERAGE GLUCOSE BLD GHB EST-MCNC: 116.9 MG/DL
GFR SERPLBLD CREATININE-BSD FMLA CKD-EPI: >60 MLS/MIN/1.73/M2
GLOBULIN SER-MCNC: 3.3 GM/DL (ref 2.4–3.5)
GLUCOSE SERPL-MCNC: 96 MG/DL (ref 82–115)
HBA1C MFR BLD: 5.7 %
HDLC SERPL-MCNC: 45 MG/DL (ref 35–60)
LDLC SERPL CALC-MCNC: 75 MG/DL (ref 50–140)
POTASSIUM SERPL-SCNC: 4.1 MMOL/L (ref 3.5–5.1)
PROT SERPL-MCNC: 7.1 GM/DL (ref 5.8–7.6)
SODIUM SERPL-SCNC: 146 MMOL/L (ref 136–145)
TRIGL SERPL-MCNC: 123 MG/DL (ref 37–140)
VLDLC SERPL CALC-MCNC: 25 MG/DL

## 2023-06-21 PROCEDURE — 36415 COLL VENOUS BLD VENIPUNCTURE: CPT

## 2023-06-21 PROCEDURE — 80061 LIPID PANEL: CPT

## 2023-06-21 PROCEDURE — 80053 COMPREHEN METABOLIC PANEL: CPT

## 2023-06-21 PROCEDURE — 83036 HEMOGLOBIN GLYCOSYLATED A1C: CPT

## 2023-06-27 ENCOUNTER — TELEPHONE (OUTPATIENT)
Dept: FAMILY MEDICINE | Facility: CLINIC | Age: 63
End: 2023-06-27
Payer: MEDICARE

## 2023-06-27 DIAGNOSIS — S81.801D WOUND OF RIGHT LOWER EXTREMITY, SUBSEQUENT ENCOUNTER: Primary | ICD-10-CM

## 2023-06-27 NOTE — TELEPHONE ENCOUNTER
----- Message from Shauna Charlie sent at 6/23/2023 11:47 AM CDT -----  Regarding: referral  Please give Mrs Harris a call back she said the place we referred her to for her leg can't take her till sometime in July, she wanted to see if a nurse could come in to take care her leg please give her a call back #[phone]

## 2023-06-30 PROCEDURE — G0180 PR HOME HEALTH MD CERTIFICATION: ICD-10-PCS | Mod: ,,, | Performed by: FAMILY MEDICINE

## 2023-06-30 PROCEDURE — G0180 MD CERTIFICATION HHA PATIENT: HCPCS | Mod: ,,, | Performed by: FAMILY MEDICINE

## 2023-07-03 ENCOUNTER — HOSPITAL ENCOUNTER (OUTPATIENT)
Dept: WOUND CARE | Facility: HOSPITAL | Age: 63
Discharge: HOME | End: 2023-07-03
Attending: FAMILY MEDICINE
Payer: MEDICARE

## 2023-07-03 VITALS
HEIGHT: 62 IN | RESPIRATION RATE: 18 BRPM | DIASTOLIC BLOOD PRESSURE: 71 MMHG | SYSTOLIC BLOOD PRESSURE: 160 MMHG | WEIGHT: 293 LBS | BODY MASS INDEX: 53.92 KG/M2 | HEART RATE: 84 BPM

## 2023-07-03 DIAGNOSIS — S81.801A OPEN WOUND OF RIGHT LOWER LEG, INITIAL ENCOUNTER: Primary | ICD-10-CM

## 2023-07-03 DIAGNOSIS — S81.801D WOUND OF RIGHT LOWER EXTREMITY, SUBSEQUENT ENCOUNTER: ICD-10-CM

## 2023-07-03 PROCEDURE — 99203 OFFICE O/P NEW LOW 30 MIN: CPT

## 2023-07-03 PROCEDURE — 27000999 HC MEDICAL RECORD PHOTO DOCUMENTATION

## 2023-07-03 RX ORDER — MIRABEGRON 50 MG/1
1 TABLET, FILM COATED, EXTENDED RELEASE ORAL NIGHTLY
COMMUNITY
Start: 2023-06-20

## 2023-07-03 RX ORDER — ESOMEPRAZOLE MAGNESIUM 40 MG/1
40 CAPSULE, DELAYED RELEASE ORAL
COMMUNITY
End: 2023-11-16 | Stop reason: SDUPTHER

## 2023-07-03 NOTE — PROGRESS NOTES
NOTES:  Patient fell in October 2022 and cut her right lower leg on a jagged pipe, she went to ED in [place] where it was debrided and stitched. She states that she completed 3 rounds of oral abx after this and the wound was healed. She saw her PCP on 6/20/23 complaining that the wound to right lower leg had opened back up on 6/17/23, after swimming and being bit by a mosquito. She was prescribed Mupirocin to use on the wound, her home health nurse had her d/c the Mupirocin and start silver alginate. Her last A1C was 5.7 on 6/21/23, she states that she is not a diabetic but has been instructed to monitor her sugar daily, today it was 101. She has never had vascular ultrasounds. She does not have a cardiologist or vascular doctor.   Subjective:       Patient ID: Kimberly Agarwal is a 63 y.o. female.    Chief Complaint: Non-healing Wound (Right lower leg)    HPI        Review of Systems   Constitutional: Negative.    Respiratory: Negative.     Cardiovascular: Negative.    Skin:         As documented in the HPI.   All other systems reviewed and are negative.      Objective:      Vitals:    07/03/23 0952   BP: (!) 160/71   Pulse: 84   Resp: 18       Physical Exam  Vitals reviewed.   Constitutional:       Appearance: Normal appearance.   Cardiovascular:      Rate and Rhythm: Normal rate.   Pulmonary:      Effort: Pulmonary effort is normal.   Skin:     General: Skin is warm and dry.      Comments: There is a small tiny open wound on the right Lower leg.  There is no tenderness or surrounding erythema.  There is no fluctuance.   Neurological:      Mental Status: She is alert.          Altered Skin Integrity 07/03/23 0955 Right lower Leg #1 (Active)   07/03/23 0955   Altered Skin Integrity Present on Admission - Did Patient arrive to the hospital with altered skin?: yes   Side: Right   Orientation: lower   Location: Leg   Wound Number: #1   Is this injury device related?:    Primary Wound Type:    Description of Altered  Skin Integrity:    Ankle-Brachial Index:    Pulses:    Removal Indication and Assessment:    Wound Outcome:    (Retired) Wound Length (cm):    (Retired) Wound Width (cm):    (Retired) Depth (cm):    Wound Description (Comments):    Removal Indications:    Dressing Appearance Moist drainage 07/03/23 0955   Drainage Amount Moderate 07/03/23 0955   Drainage Characteristics/Odor Serosanguineous 07/03/23 0955   Appearance Red;Yellow;Moist 07/03/23 0955   Tissue loss description Full thickness 07/03/23 0955   Periwound Area Dry;Kendrick 07/03/23 0955   Wound Edges Defined 07/03/23 0955   Wound Length (cm) 0.3 cm 07/03/23 0955   Wound Width (cm) 0.4 cm 07/03/23 0955   Wound Depth (cm) 0.2 cm 07/03/23 0955   Wound Volume (cm^3) 0.024 cm^3 07/03/23 0955   Wound Surface Area (cm^2) 0.12 cm^2 07/03/23 0955   Care Cleansed with:;Wound cleanser 07/03/23 0955   Dressing Applied 07/03/23 0955   Dressing Change Due 07/04/23 07/03/23 0955         7/3/23    Right lower leg (pre)      Assessment:         ICD-10-CM ICD-9-CM   1. Wound of right lower extremity, subsequent encounter  S81.801D V58.89     894.0           Procedures:           [] Yes [x] No   I & D performed  [] Yes [x] No   Excisional debridement performed  [] Yes [x] No   Selective debridement performed           [] Yes [x] No   Mechanical debridement performed         [] Yes [x] No  Silver nitrate applied                                     [] Yes [x] No  Labs ordered this visit                                  [] Yes [x] No   Imaging ordered this visit                           [] Yes [x] No   Tissue pathology and/or culture taken             MEDICATIONS    Current Outpatient Medications:     betamethasone valerate 0.1% (VALISONE) 0.1 % Crea, Apply topically once daily., Disp: 45 g, Rfl: 3    blood sugar diagnostic Strp, To check BG 1 times daily, to use with insurance preferred meter, Disp: 200 each, Rfl: 5    blood-glucose meter kit, To check BG 1 times daily, to use with  insurance preferred meter, Disp: 1 each, Rfl: 0    calcium carbonate/vitamin D3 (CALTRATE 600 PLUS D ORAL), Take 1 tablet by mouth 2 (two) times daily., Disp: , Rfl:     cetirizine (ZYRTEC) 10 MG tablet, Take 10 mg by mouth every evening., Disp: , Rfl:     cyclobenzaprine (FLEXERIL) 10 MG tablet, Take 1 tablet (10 mg total) by mouth 3 (three) times daily as needed for Muscle spasms., Disp: 270 tablet, Rfl: 0    DULoxetine (CYMBALTA) 30 MG capsule, Take 60 mg by mouth every morning., Disp: , Rfl:     esomeprazole (NEXIUM) 40 MG capsule, Take 40 mg by mouth., Disp: , Rfl:     furosemide (LASIX) 40 MG tablet, Take 1 tablet (40 mg total) by mouth 2 (two) times a day., Disp: 180 tablet, Rfl: 0    HYDROcodone-acetaminophen (NORCO) 7.5-325 mg per tablet, Take 1 tablet by mouth 2 (two) times daily as needed for Pain., Disp: , Rfl:     losartan (COZAAR) 25 MG tablet, Take 1 tablet (25 mg total) by mouth once daily., Disp: 90 tablet, Rfl: 3    losartan-hydrochlorothiazide 50-12.5 mg (HYZAAR) 50-12.5 mg per tablet, Take 1 tablet by mouth every morning., Disp: 90 tablet, Rfl: 1    lutein 40 mg Cap, Take 1 capsule by mouth every morning., Disp: , Rfl:     metFORMIN (GLUCOPHAGE) 500 MG tablet, Take 1 tablet (500 mg total) by mouth 2 (two) times daily with meals., Disp: 180 tablet, Rfl: 3    metroNIDAZOLE (METROGEL) 0.75 % gel, Apply topically 2 (two) times daily., Disp: 135 g, Rfl: 0    MYRBETRIQ 50 mg Tb24, Take 1 tablet by mouth., Disp: , Rfl:     polyethylene glycol (GLYCOLAX) 17 gram PwPk, Take 17 g by mouth every evening., Disp: , Rfl:     potassium chloride SA (K-DUR,KLOR-CON) 20 MEQ tablet, TAKE 1 TABLET TWICE DAILY, Disp: 180 tablet, Rfl: 3    pramipexole (MIRAPEX) 0.125 MG tablet, Take 1 tablet (0.125 mg total) by mouth every evening., Disp: 90 tablet, Rfl: 0    pregabalin (LYRICA) 150 MG capsule, Take 150 mg by mouth 2 (two) times a day., Disp: , Rfl:     simvastatin (ZOCOR) 20 MG tablet, Take 1 tablet (20 mg total) by  mouth every evening., Disp: 90 tablet, Rfl: 0    valACYclovir (VALTREX) 500 MG tablet, Take 500 mg by mouth daily as needed., Disp: , Rfl:     mupirocin (BACTROBAN) 2 % ointment, Apply topically 3 (three) times daily. (Patient not taking: Reported on 7/3/2023), Disp: 30 g, Rfl: 3  No current facility-administered medications for this encounter.    Facility-Administered Medications Ordered in Other Encounters:     lactated ringers infusion, , Intravenous, Continuous, Jayjay E Baudoin, DO, Last Rate: 10 mL/hr at 09/09/22 0945, New Bag at 09/09/22 0945    lactated ringers infusion, , Intravenous, Continuous, Jayjay E Baudoin, DO, Last Rate: 10 mL/hr at 05/05/23 0713, New Bag at 05/05/23 0713   Review of patient's allergies indicates:   Allergen Reactions    Latex Rash    Adhesive tape-silicones Blisters    Ciprofloxacin        DIAGNOSTICS      Labs/Scans/Micro:    CBC:   Lab Results   Component Value Date    WBC 5.5 10/31/2022    HGB 12.3 10/31/2022    HCT 38.8 10/31/2022    MCV 92.2 10/31/2022     10/31/2022       Sedimentation rate: No results found for: SEDRATE C-reactive protein: No results found for: CRP Chemistry: [unfilled]  HBA1C: No components found for: HBA1C    Ankle Brachial Index: No results found for this or any previous visit.      Imaging:    Plain film: No results found for this or any previous visit.    MRI: No results found for this or any previous visit.   No results found for this or any previous visit.    Bone Scan: No results found for this or any previous visit.   No results found for this or any previous visit.      Vascular studies: none    Microbiology: No results found for: MICRO    HOME [facility]  TIMES PER WEEK/DAYS:  1  WOUND CARE ORDERS: right lower leg: cleanse with wound cleanser, apply Mupirocin to wound, cover with foam border dressing, change daily      Follow up in 1 week (on 7/10/2023) for right lower leg.

## 2023-07-10 ENCOUNTER — HOSPITAL ENCOUNTER (OUTPATIENT)
Dept: WOUND CARE | Facility: HOSPITAL | Age: 63
Discharge: HOME | End: 2023-07-10
Attending: NURSE PRACTITIONER
Payer: MEDICARE

## 2023-07-10 ENCOUNTER — EXTERNAL HOME HEALTH (OUTPATIENT)
Dept: HOME HEALTH SERVICES | Facility: HOSPITAL | Age: 63
End: 2023-07-10
Payer: MEDICARE

## 2023-07-10 VITALS
HEART RATE: 75 BPM | DIASTOLIC BLOOD PRESSURE: 86 MMHG | BODY MASS INDEX: 53.92 KG/M2 | SYSTOLIC BLOOD PRESSURE: 174 MMHG | HEIGHT: 62 IN | WEIGHT: 293 LBS | RESPIRATION RATE: 18 BRPM

## 2023-07-10 DIAGNOSIS — S81.801D WOUND OF RIGHT LOWER EXTREMITY, SUBSEQUENT ENCOUNTER: Primary | ICD-10-CM

## 2023-07-10 PROCEDURE — 99213 OFFICE O/P EST LOW 20 MIN: CPT

## 2023-07-10 PROCEDURE — 27000999 HC MEDICAL RECORD PHOTO DOCUMENTATION

## 2023-07-10 PROCEDURE — 97597 DBRDMT OPN WND 1ST 20 CM/<: CPT

## 2023-07-10 RX ORDER — DULOXETIN HYDROCHLORIDE 60 MG/1
CAPSULE, DELAYED RELEASE ORAL EVERY MORNING
COMMUNITY
Start: 2023-07-05

## 2023-07-10 NOTE — PROGRESS NOTES
Subjective:       Patient ID: Kimberly Agarwal is a 63 y.o. female.    Chief Complaint: Non-healing Wound Follow Up (Right lower leg)    HPI  Ms. Agarwal returns to clinic today for follow up on an area of concern at the right lower leg.  She reports that she fell in October, 2022, cutting her right lower leg on a jagged piece of PVC piping.  She did present to the [place] ED at that time, the wound was debrided and stitched.    She states that she completed 3 rounds of oral abx after this and the wound was healed.   She saw her PCP on 6/20/23 complaining that the wound to right lower leg had opened back up on 6/17/23, after swimming and being bit by a mosquito.   She was prescribed Mupirocin to use on the wound, her home health nurse had her d/c the Mupirocin and start silver alginate. (Without an order)  Her last A1C was 5.7 on 6/21/23, she states that she is not a diabetic but has been instructed to monitor her sugar daily, today it was 101. She has never had vascular ultrasounds. She does not have a cardiologist or vascular doctor.     Today the area was assessed and there was a thin layer of fibrin over an open area.  A small amount of purulent exudate was expressed using a martini curette.  A culture was obtained from the site.    There is a very small tiny open wound.   There is no tenderness or surrounding erythema.  There is no fluctuance.  However, there is some depth which does extend to 0.6 cm.  The patient will be notified of her culture results once they received and she will be prescribed medication if indicated.  She was advised that for the time being she is to continue using the mupirocin regardless of what Home Health has instructed her to do.    Review of Systems   Constitutional: Negative.    Respiratory: Negative.     Cardiovascular: Negative.    Skin:         As documented in the HPI.   All other systems reviewed and are negative.      Objective:      Vitals:    07/10/23 0958   BP: (!)  174/86   Pulse: 75   Resp: 18       Physical Exam  Vitals reviewed.   Constitutional:       Appearance: Normal appearance.   Cardiovascular:      Rate and Rhythm: Normal rate.   Pulmonary:      Effort: Pulmonary effort is normal.   Skin:     General: Skin is warm and dry.      Comments: Small open wound, right lower leg   Neurological:      Mental Status: She is alert.          Altered Skin Integrity 07/03/23 0955 Right lower Leg #1 (Active)   07/03/23 0955   Altered Skin Integrity Present on Admission - Did Patient arrive to the hospital with altered skin?: yes   Side: Right   Orientation: lower   Location: Leg   Wound Number: #1   Is this injury device related?:    Primary Wound Type:    Description of Altered Skin Integrity:    Ankle-Brachial Index:    Pulses:    Removal Indication and Assessment:    Wound Outcome:    (Retired) Wound Length (cm):    (Retired) Wound Width (cm):    (Retired) Depth (cm):    Wound Description (Comments):    Removal Indications:    Dressing Appearance Moist drainage 07/10/23 1000   Drainage Amount Moderate 07/10/23 1000   Drainage Characteristics/Odor Serosanguineous 07/10/23 1000   Appearance Qiu;Dry;Fibrin 07/10/23 1000   Tissue loss description Full thickness 07/10/23 1000   Periwound Area Intact 07/10/23 1000   Wound Length (cm) 0.4 cm 07/10/23 1000   Wound Width (cm) 0.5 cm 07/10/23 1000   Wound Depth (cm) 0.2 cm 07/10/23 1000   Wound Volume (cm^3) 0.04 cm^3 07/10/23 1000   Wound Surface Area (cm^2) 0.2 cm^2 07/10/23 1000   Undermining (depth (cm)/location) 0.6cm from 7-9 o'clock 07/10/23 1000   Care Cleansed with:;Wound cleanser;Debrided 07/10/23 1000   Dressing Applied;Other (comment) 07/10/23 1000   Dressing Change Due 07/11/23 07/10/23 1000         7/10/23         Right lower leg         Right lower leg - post       Assessment:         ICD-10-CM ICD-9-CM   1. Wound of right lower extremity, subsequent encounter  S81.801D V58.89     894.0             Procedures:  "    Debridement     Date/Time: 7/10/2023 9:37 AM  Performed by: Fatou Patel NP  Authorized by: Fatou Patel NP      Time out: Immediately prior to procedure a "time out" was called to verify the correct patient, procedure, equipment, support staff and site/side marked as required.     Consent Done?:  Yes (Verbal)     Preparation: Patient was prepped and draped with clean technique    Local anesthesia used?: No       Wound Details:    Location:  Right leg       Length (cm):  0.4       Area (sq cm):  0.2       Width (cm):  0.5       Percent Debrided (%):  100       Depth (cm):  0.2       Total Area Debrided (sq cm):  0.2    Depth of debridement:  Epidermis/Dermis    Devitalized tissue debrided:  Biofilm, Exudate and Fibrin    Instruments:  Curette (Martini)  Bleeding:  None      [] Yes [x] No   I & D performed  [] Yes [x] No   Excisional debridement performed  [x] Yes [] No   Selective debridement performed  (martini curette)      [] Yes [x] No   Mechanical debridement performed         [] Yes [x] No  Silver nitrate applied                                     [] Yes [x] No  Labs ordered this visit                                  [] Yes [x] No   Imaging ordered this visit                           [x] Yes [] No   Tissue pathology and/or culture taken (1Z 9RX 645 [id number])      MEDICATIONS    Current Outpatient Medications:     betamethasone valerate 0.1% (VALISONE) 0.1 % Crea, Apply topically once daily., Disp: 45 g, Rfl: 3    blood sugar diagnostic Strp, To check BG 1 times daily, to use with insurance preferred meter, Disp: 200 each, Rfl: 5    blood-glucose meter kit, To check BG 1 times daily, to use with insurance preferred meter, Disp: 1 each, Rfl: 0    calcium carbonate/vitamin D3 (CALTRATE 600 PLUS D ORAL), Take 1 tablet by mouth 2 (two) times daily., Disp: , Rfl:     cetirizine (ZYRTEC) 10 MG tablet, Take 10 mg by mouth every evening., Disp: , Rfl:     cyclobenzaprine (FLEXERIL) 10 MG tablet, " Take 1 tablet (10 mg total) by mouth 3 (three) times daily as needed for Muscle spasms., Disp: 270 tablet, Rfl: 0    DULoxetine (CYMBALTA) 60 MG capsule, , Disp: , Rfl:     esomeprazole (NEXIUM) 40 MG capsule, Take 40 mg by mouth., Disp: , Rfl:     furosemide (LASIX) 40 MG tablet, Take 1 tablet (40 mg total) by mouth 2 (two) times a day., Disp: 180 tablet, Rfl: 0    HYDROcodone-acetaminophen (NORCO) 7.5-325 mg per tablet, Take 1 tablet by mouth 2 (two) times daily as needed for Pain., Disp: , Rfl:     losartan (COZAAR) 25 MG tablet, Take 1 tablet (25 mg total) by mouth once daily., Disp: 90 tablet, Rfl: 3    losartan-hydrochlorothiazide 50-12.5 mg (HYZAAR) 50-12.5 mg per tablet, Take 1 tablet by mouth every morning., Disp: 90 tablet, Rfl: 1    lutein 40 mg Cap, Take 1 capsule by mouth every morning., Disp: , Rfl:     metFORMIN (GLUCOPHAGE) 500 MG tablet, Take 1 tablet (500 mg total) by mouth 2 (two) times daily with meals., Disp: 180 tablet, Rfl: 3    metroNIDAZOLE (METROGEL) 0.75 % gel, Apply topically 2 (two) times daily., Disp: 135 g, Rfl: 0    mupirocin (BACTROBAN) 2 % ointment, Apply topically 3 (three) times daily., Disp: 30 g, Rfl: 3    MYRBETRIQ 50 mg Tb24, Take 1 tablet by mouth., Disp: , Rfl:     polyethylene glycol (GLYCOLAX) 17 gram PwPk, Take 17 g by mouth every evening., Disp: , Rfl:     potassium chloride SA (K-DUR,KLOR-CON) 20 MEQ tablet, TAKE 1 TABLET TWICE DAILY, Disp: 180 tablet, Rfl: 3    pramipexole (MIRAPEX) 0.125 MG tablet, Take 1 tablet (0.125 mg total) by mouth every evening., Disp: 90 tablet, Rfl: 0    pregabalin (LYRICA) 150 MG capsule, Take 150 mg by mouth 2 (two) times a day., Disp: , Rfl:     simvastatin (ZOCOR) 20 MG tablet, Take 1 tablet (20 mg total) by mouth every evening., Disp: 90 tablet, Rfl: 0    valACYclovir (VALTREX) 500 MG tablet, Take 500 mg by mouth daily as needed., Disp: , Rfl:   No current facility-administered medications for this encounter.    Facility-Administered  Medications Ordered in Other Encounters:     lactated ringers infusion, , Intravenous, Continuous, Jayjay E Baudoin, DO, Last Rate: 10 mL/hr at 09/09/22 0945, New Bag at 09/09/22 0945    lactated ringers infusion, , Intravenous, Continuous, Jayjay E Baudoin, DO, Last Rate: 10 mL/hr at 05/05/23 0713, New Bag at 05/05/23 0713   Review of patient's allergies indicates:   Allergen Reactions    Latex Rash    Adhesive tape-silicones Blisters    Ciprofloxacin        DIAGNOSTICS      Labs/Scans/Micro:    CBC:   Lab Results   Component Value Date    WBC 5.5 10/31/2022    HGB 12.3 10/31/2022    HCT 38.8 10/31/2022    MCV 92.2 10/31/2022     10/31/2022       Sedimentation rate: No results found for: SEDRATE C-reactive protein: No results found for: CRP Chemistry: [unfilled]  HBA1C: No components found for: HBA1C    Ankle Brachial Index: No results found for this or any previous visit.      Imaging:    Plain film: No results found for this or any previous visit.    MRI: No results found for this or any previous visit.   No results found for this or any previous visit.    Bone Scan: No results found for this or any previous visit.   No results found for this or any previous visit.      Vascular studies: none    Microbiology: obtained 7/10/23    [facility] [place]  TIMES PER WEEK/DAYS:  1  WOUND CARE ORDERS:   Right lower leg wound: Cleanse with wound cleanser, apply mupirocin ointment to wound, cover with mepilex foam, and kerlix to be changed daily.  HH is not to change orders without expressed consent from provider.       Follow up in 1 week (on 7/17/2023) for right lower leg.

## 2023-07-11 NOTE — PROCEDURES
"Debridement    Date/Time: 7/10/2023 9:37 AM  Performed by: Fatou Patel NP  Authorized by: Fatou Patel NP     Time out: Immediately prior to procedure a "time out" was called to verify the correct patient, procedure, equipment, support staff and site/side marked as required.    Consent Done?:  Yes (Verbal)    Preparation: Patient was prepped and draped with clean technique    Local anesthesia used?: No      Wound Details:    Location:  Right leg       Length (cm):  0.4       Area (sq cm):  0.2       Width (cm):  0.5       Percent Debrided (%):  100       Depth (cm):  0.2       Total Area Debrided (sq cm):  0.2    Depth of debridement:  Epidermis/Dermis    Devitalized tissue debrided:  Biofilm, Exudate and Fibrin    Instruments:  Curette (Martini)  Bleeding:  None  "

## 2023-07-17 ENCOUNTER — HOSPITAL ENCOUNTER (OUTPATIENT)
Dept: WOUND CARE | Facility: HOSPITAL | Age: 63
Discharge: HOME | End: 2023-07-17
Attending: NURSE PRACTITIONER
Payer: MEDICARE

## 2023-07-17 VITALS
BODY MASS INDEX: 53.92 KG/M2 | RESPIRATION RATE: 17 BRPM | HEART RATE: 70 BPM | DIASTOLIC BLOOD PRESSURE: 88 MMHG | WEIGHT: 293 LBS | SYSTOLIC BLOOD PRESSURE: 170 MMHG | HEIGHT: 62 IN

## 2023-07-17 DIAGNOSIS — S81.801D WOUND OF RIGHT LOWER EXTREMITY, SUBSEQUENT ENCOUNTER: Primary | ICD-10-CM

## 2023-07-17 PROCEDURE — 27000999 HC MEDICAL RECORD PHOTO DOCUMENTATION

## 2023-07-17 PROCEDURE — 99213 OFFICE O/P EST LOW 20 MIN: CPT

## 2023-07-17 PROCEDURE — 10060 I&D ABSCESS SIMPLE/SINGLE: CPT

## 2023-07-17 NOTE — PROCEDURES
Incision and Drainage    Date/Time: 7/17/2023 8:20 AM  Location procedure was performed: [facility] OUTPATIENT WOUND CARE  Performed by: Fatou Patel NP  Authorized by: Fatou Patel NP   Consent Done: Not Needed  Type: bulla  Body area: lower extremity  Location details: right leg  Scalpel size: 15  Incision type: single straight  Incision depth: dermal  Complexity: simple  Drainage: purulent  Drainage amount: moderate  Wound treatment: incision, drainage and wound left open  Complications: No  Specimens: Yes  Comments: Wound culture obtained    Incision depth: dermal

## 2023-07-17 NOTE — PROGRESS NOTES
Subjective:       Patient ID: Kimberly Agarwal is a 63 y.o. female.    Chief Complaint: Non-healing Wound Follow Up (Right lower leg)    HPI  Ms. Agarwal returns to clinic today for follow up on an area of concern at the right lower leg.  She reports that she fell in October, 2022, cutting her right lower leg on a jagged piece of PVC piping.  She did present to the [place] ED at that time, the wound was debrided and stitched.    She states that she completed 3 rounds of oral abx after this and the wound was healed.   She saw her PCP on 6/20/23 complaining that the wound to right lower leg had opened back up on 6/17/23, after swimming and being bit by a mosquito.   She was prescribed Mupirocin to use on the wound, her home health nurse had her d/c the Mupirocin and start silver alginate. (Without an order)  Her last A1C was 5.7 on 6/21/23, she states that she is not a diabetic but has been instructed to monitor her sugar daily, today it was 101. She has never had vascular ultrasounds. She does not have a cardiologist or vascular doctor.     Today the area was assessed and there was a thin layer of fibrin over an open area.  A small amount of purulent exudate was expressed using a martini curette.  A culture was obtained from the site.    There is a very small tiny open wound.   There is no tenderness or surrounding erythema.  There is no fluctuance.  However, there is some depth which does extend to 0.6 cm.  The patient will be notified of her culture results once they received and she will be prescribed medication if indicated.  She was advised that for the time being she is to continue using the mupirocin regardless of what Home Health has instructed her to do.    7/17/23 - The patient today for followup on a small area at the right lower leg.  We did review together the culture results from her initial visit which showed 100% Cutibacterium, with no pharmacy guidance provided.  She will not be receiving an  antibiotic today although PAP did provide a recommendation.  Upon assessment, the initial wound is improved.  However, she does have a complaint about an adjacent area that is also swollen. An I & D was performed at that adjacent area using a #15 scalpel.    A hard head was removed and once the head was removed A moderate amount of purulent exudate was expressed.  This exudate was cultured in an effort to determine if it is the same bacteria or if staph is involved.  For the time being, she will continue to apply mupirocin to the entire area and cover with a bandage.    Review of Systems   Constitutional: Negative.    Respiratory: Negative.     Cardiovascular: Negative.    Skin:         As documented in the HPI.   All other systems reviewed and are negative.      Objective:      Vitals:    07/17/23 0829   BP: (!) 170/88   Pulse: 70   Resp: 17       Physical Exam  Vitals reviewed.   Constitutional:       Appearance: Normal appearance.   Cardiovascular:      Rate and Rhythm: Normal rate.   Pulmonary:      Effort: Pulmonary effort is normal.   Skin:     General: Skin is warm and dry.      Comments: Small open wound, right lower leg   Neurological:      Mental Status: She is alert.          Altered Skin Integrity 07/03/23 0955 Right lower Leg #1 (Active)   07/03/23 0955   Altered Skin Integrity Present on Admission - Did Patient arrive to the hospital with altered skin?: yes   Side: Right   Orientation: lower   Location: Leg   Wound Number: #1   Is this injury device related?:    Primary Wound Type:    Description of Altered Skin Integrity:    Ankle-Brachial Index:    Pulses:    Removal Indication and Assessment:    Wound Outcome:    (Retired) Wound Length (cm):    (Retired) Wound Width (cm):    (Retired) Depth (cm):    Wound Description (Comments):    Removal Indications:    Dressing Appearance Dry 07/17/23 0830   Drainage Amount None 07/17/23 0830   Appearance Dry;Fibrin 07/17/23 0830   Tissue loss description Full  thickness 07/17/23 0830   Periwound Area Intact 07/17/23 0830   Wound Edges Open 07/17/23 0830   Wound Length (cm) 0.1 cm 07/17/23 0830   Wound Width (cm) 0.1 cm 07/17/23 0830   Wound Depth (cm) 0.1 cm 07/17/23 0830   Wound Volume (cm^3) 0.001 cm^3 07/17/23 0830   Wound Surface Area (cm^2) 0.01 cm^2 07/17/23 0830   Care Cleansed with:;Wound cleanser 07/17/23 0830   Dressing Applied;Other (comment) 07/17/23 0830   Dressing Change Due 07/18/23 07/17/23 0830 7/17/23        Right lower leg         Right lower leg - post         Assessment:         ICD-10-CM ICD-9-CM   1. Wound of right lower extremity, subsequent encounter  S81.801D V58.89     894.0               Procedures:     Incision and Drainage     Date/Time: 7/17/2023 8:20 AM  Location procedure was performed: [facility] OUTPATIENT WOUND CARE  Performed by: Fatou Patel NP  Authorized by: Fatou Patel NP   Consent Done: Not Needed  Type: bulla  Body area: lower extremity  Location details: right leg  Scalpel size: 15  Incision type: single straight  Incision depth: dermal  Complexity: simple  Drainage: purulent  Drainage amount: moderate  Wound treatment: incision, drainage and wound left open  Complications: No  Specimens: Yes  Comments: Wound culture obtained     Incision depth: dermal         [x] Yes [] No   I & D performed  [] Yes [x] No   Excisional debridement performed  [] Yes [x] No   Selective debridement performed  (martini curette)      [] Yes [x] No   Mechanical debridement performed         [] Yes [x] No  Silver nitrate applied                                     [] Yes [x] No  Labs ordered this visit                                  [] Yes [x] No   Imaging ordered this visit                           [x] Yes [] No   Tissue pathology and/or culture taken, 1Z 9Rx 645 [id number]    MEDICATIONS    Current Outpatient Medications:     betamethasone valerate 0.1% (VALISONE) 0.1 % Crea, Apply topically once daily., Disp: 45 g, Rfl: 3    blood  sugar diagnostic Strp, To check BG 1 times daily, to use with insurance preferred meter, Disp: 200 each, Rfl: 5    blood-glucose meter kit, To check BG 1 times daily, to use with insurance preferred meter, Disp: 1 each, Rfl: 0    calcium carbonate/vitamin D3 (CALTRATE 600 PLUS D ORAL), Take 1 tablet by mouth 2 (two) times daily., Disp: , Rfl:     cetirizine (ZYRTEC) 10 MG tablet, Take 10 mg by mouth every evening., Disp: , Rfl:     cyclobenzaprine (FLEXERIL) 10 MG tablet, Take 1 tablet (10 mg total) by mouth 3 (three) times daily as needed for Muscle spasms., Disp: 270 tablet, Rfl: 0    DULoxetine (CYMBALTA) 60 MG capsule, , Disp: , Rfl:     esomeprazole (NEXIUM) 40 MG capsule, Take 40 mg by mouth., Disp: , Rfl:     furosemide (LASIX) 40 MG tablet, Take 1 tablet (40 mg total) by mouth 2 (two) times a day., Disp: 180 tablet, Rfl: 0    HYDROcodone-acetaminophen (NORCO) 7.5-325 mg per tablet, Take 1 tablet by mouth 2 (two) times daily as needed for Pain., Disp: , Rfl:     losartan (COZAAR) 25 MG tablet, Take 1 tablet (25 mg total) by mouth once daily., Disp: 90 tablet, Rfl: 3    losartan-hydrochlorothiazide 50-12.5 mg (HYZAAR) 50-12.5 mg per tablet, Take 1 tablet by mouth every morning., Disp: 90 tablet, Rfl: 1    lutein 40 mg Cap, Take 1 capsule by mouth every morning., Disp: , Rfl:     metFORMIN (GLUCOPHAGE) 500 MG tablet, Take 1 tablet (500 mg total) by mouth 2 (two) times daily with meals., Disp: 180 tablet, Rfl: 3    metroNIDAZOLE (METROGEL) 0.75 % gel, Apply topically 2 (two) times daily., Disp: 135 g, Rfl: 0    mupirocin (BACTROBAN) 2 % ointment, Apply topically 3 (three) times daily., Disp: 30 g, Rfl: 3    MYRBETRIQ 50 mg Tb24, Take 1 tablet by mouth., Disp: , Rfl:     polyethylene glycol (GLYCOLAX) 17 gram PwPk, Take 17 g by mouth every evening., Disp: , Rfl:     potassium chloride SA (K-DUR,KLOR-CON) 20 MEQ tablet, TAKE 1 TABLET TWICE DAILY, Disp: 180 tablet, Rfl: 3    pramipexole (MIRAPEX) 0.125 MG tablet,  Take 1 tablet (0.125 mg total) by mouth every evening., Disp: 90 tablet, Rfl: 0    pregabalin (LYRICA) 150 MG capsule, Take 150 mg by mouth 2 (two) times a day., Disp: , Rfl:     simvastatin (ZOCOR) 20 MG tablet, Take 1 tablet (20 mg total) by mouth every evening., Disp: 90 tablet, Rfl: 0    valACYclovir (VALTREX) 500 MG tablet, Take 500 mg by mouth daily as needed., Disp: , Rfl:   No current facility-administered medications for this encounter.    Facility-Administered Medications Ordered in Other Encounters:     lactated ringers infusion, , Intravenous, Continuous, Jayjay E Baudoin, DO, Last Rate: 10 mL/hr at 09/09/22 0945, New Bag at 09/09/22 0945    lactated ringers infusion, , Intravenous, Continuous, Jayjay E Baudoin, DO, Last Rate: 10 mL/hr at 05/05/23 0713, New Bag at 05/05/23 0713   Review of patient's allergies indicates:   Allergen Reactions    Latex Rash    Adhesive tape-silicones Blisters    Ciprofloxacin        DIAGNOSTICS      Labs/Scans/Micro:    CBC:   Lab Results   Component Value Date    WBC 5.5 10/31/2022    HGB 12.3 10/31/2022    HCT 38.8 10/31/2022    MCV 92.2 10/31/2022     10/31/2022       Sedimentation rate: No results found for: SEDRATE C-reactive protein: No results found for: CRP Chemistry: [unfilled]  HBA1C: No components found for: HBA1C    Ankle Brachial Index: No results found for this or any previous visit.      Imaging:    Plain film: No results found for this or any previous visit.    MRI: No results found for this or any previous visit.   No results found for this or any previous visit.    Bone Scan: No results found for this or any previous visit.   No results found for this or any previous visit.      Vascular studies: none    Microbiology: Culture obtained 7/17/23    [facility]ALTH [facility]ENCY:  [facility] [place]  TIMES PER WEEK/DAYS:  1  WOUND CARE ORDERS:   Right lower leg wound: Cleanse with wound cleanser, apply mupirocin ointment to wound, cover with  mepilex foam, and coban to be changed daily.  HH is not to change orders without expressed consent from provider.       Follow up in 1 week (on 7/24/2023) for right leg .

## 2023-07-24 ENCOUNTER — HOSPITAL ENCOUNTER (OUTPATIENT)
Dept: WOUND CARE | Facility: HOSPITAL | Age: 63
Discharge: HOME | End: 2023-07-24
Attending: NURSE PRACTITIONER
Payer: MEDICARE

## 2023-07-24 VITALS
DIASTOLIC BLOOD PRESSURE: 84 MMHG | HEART RATE: 66 BPM | WEIGHT: 293 LBS | SYSTOLIC BLOOD PRESSURE: 173 MMHG | BODY MASS INDEX: 53.92 KG/M2 | RESPIRATION RATE: 18 BRPM | HEIGHT: 62 IN

## 2023-07-24 DIAGNOSIS — S81.801D WOUND OF RIGHT LOWER EXTREMITY, SUBSEQUENT ENCOUNTER: Primary | ICD-10-CM

## 2023-07-24 PROCEDURE — 27000999 HC MEDICAL RECORD PHOTO DOCUMENTATION

## 2023-07-24 PROCEDURE — 99212 OFFICE O/P EST SF 10 MIN: CPT

## 2023-07-24 NOTE — PROGRESS NOTES
Subjective:       Patient ID: Kimberly Agarwal is a 63 y.o. female.    Chief Complaint: Non-healing Wound (Right lower leg )    HPI  Ms. Agarwal returns to clinic today for follow up on an area of concern at the right lower leg.  She reports that she fell in October, 2022, cutting her right lower leg on a jagged piece of PVC piping.  She did present to the [place] ED at that time, the wound was debrided and stitched.    She states that she completed 3 rounds of oral abx after this and the wound was healed.   She saw her PCP on 6/20/23 complaining that the wound to right lower leg had opened back up on 6/17/23, after swimming and being bit by a mosquito.   She was prescribed Mupirocin to use on the wound, her home health nurse had her d/c the Mupirocin and start silver alginate. (Without an order)  Her last A1C was 5.7 on 6/21/23, she states that she is not a diabetic but has been instructed to monitor her sugar daily, today it was 101. She has never had vascular ultrasounds. She does not have a cardiologist or vascular doctor.     Today the area was assessed and there was a thin layer of fibrin over an open area.  A small amount of purulent exudate was expressed using a martini curette.  A culture was obtained from the site.    There is a very small tiny open wound.   There is no tenderness or surrounding erythema.  There is no fluctuance.  However, there is some depth which does extend to 0.6 cm.  The patient will be notified of her culture results once they received and she will be prescribed medication if indicated.  She was advised that for the time being she is to continue using the mupirocin regardless of what Home Health has instructed her to do.    7/17/23 - The patient today for followup on a small area at the right lower leg.  We did review together the culture results from her initial visit which showed 100% Cutibacterium, with no pharmacy guidance provided.  She will not be receiving an antibiotic  today although PAP did provide a recommendation.  Upon assessment, the initial wound is improved.  However, she does have a complaint about an adjacent area that is also swollen. An I & D was performed at that adjacent area using a #15 scalpel.    A hard head was removed and once the head was removed A moderate amount of purulent exudate was expressed.  This exudate was cultured in an effort to determine if it is the same bacteria or if staph is involved.  For the time being, she will continue to apply mupirocin to the entire area and cover with a bandage.    7/24/23 - Ms. Agarwal turns today for followup on the small wound to the right lower leg.  Today, she has again an increase in pain.  At the last visit a 2nd culture was obtained and this culture shows no pathogens present in spite of their being a small amount of purulent exudate after the wound was I & D'd.  Today, she reports that there has still been a small amount of drainage, however, this was not seen at today's visit.  We will be ordering an ultrasound of the right lower leg to determine if any foreign body can be detected in the wound that may be causing this recurrent pain, inflammation, and drainage.  Additionally, we will order bilateral lower extremity cardiovascular ultrasounds to ensure that she has adequate vascular support for healing.  In the meantime she will continue to apply mupirocin ointment.  Additionally, she was advised that she should apply Ichthammol drawing salve to the area nightly.  That product is available over-the-counter at her local pharmacy.      Review of Systems   Constitutional: Negative.    Respiratory: Negative.     Cardiovascular: Negative.    Skin:         As documented in the HPI.   All other systems reviewed and are negative.      Objective:      Vitals:    07/24/23 0952   BP: (!) 173/84   Pulse: 66   Resp: 18       Physical Exam  Vitals reviewed.   Constitutional:       Appearance: Normal appearance.    Cardiovascular:      Rate and Rhythm: Normal rate.   Pulmonary:      Effort: Pulmonary effort is normal.   Skin:     General: Skin is warm and dry.      Comments: Small open wound, right lower leg   Neurological:      Mental Status: She is alert.          Altered Skin Integrity 07/03/23 0955 Right lower Leg #1 (Active)   07/03/23 0955   Altered Skin Integrity Present on Admission - Did Patient arrive to the hospital with altered skin?: yes   Side: Right   Orientation: lower   Location: Leg   Wound Number: #1   Is this injury device related?:    Primary Wound Type:    Description of Altered Skin Integrity:    Ankle-Brachial Index:    Pulses:    Removal Indication and Assessment:    Wound Outcome:    (Retired) Wound Length (cm):    (Retired) Wound Width (cm):    (Retired) Depth (cm):    Wound Description (Comments):    Removal Indications:    Dressing Appearance Dry 07/24/23 1019   Drainage Amount Small 07/24/23 1019   Drainage Characteristics/Odor Serosanguineous 07/24/23 1019   Appearance Intact 07/24/23 1019   Tissue loss description Full thickness 07/24/23 1019   Periwound Area Redness;Swelling 07/24/23 1019   Wound Edges Open 07/24/23 1019   Wound Length (cm) 0.1 cm 07/24/23 1019   Wound Width (cm) 0.1 cm 07/24/23 1019   Wound Depth (cm) 0.1 cm 07/24/23 1019   Wound Volume (cm^3) 0.001 cm^3 07/24/23 1019   Wound Surface Area (cm^2) 0.01 cm^2 07/24/23 1019   Care Cleansed with:;Wound cleanser 07/24/23 1019   Dressing Applied 07/24/23 1019   Dressing Change Due 07/25/23 07/24/23 1019         07/24/23      Right lower leg                                               Right lower leg - post   4x4 gauze, coban        Assessment:         ICD-10-CM ICD-9-CM   1. Wound of right lower extremity, subsequent encounter  S81.801D V58.89     894.0             Procedures:     Mechanical debridement only    [] Yes [x] No   I & D performed  [] Yes [x] No   Excisional debridement performed  [] Yes [x] No   Selective debridement  performed      [x] Yes [] No   Mechanical debridement performed         [] Yes [x] No  Silver nitrate applied                                     [] Yes [x] No  Labs ordered this visit                                  [] Yes [x] No   Imaging ordered this visit                           [] Yes [x] No   Tissue pathology and/or culture taken    MEDICATIONS    Current Outpatient Medications:     betamethasone valerate 0.1% (VALISONE) 0.1 % Crea, Apply topically once daily., Disp: 45 g, Rfl: 3    blood sugar diagnostic Strp, To check BG 1 times daily, to use with insurance preferred meter, Disp: 200 each, Rfl: 5    calcium carbonate/vitamin D3 (CALTRATE 600 PLUS D ORAL), Take 1 tablet by mouth 2 (two) times daily., Disp: , Rfl:     cetirizine (ZYRTEC) 10 MG tablet, Take 10 mg by mouth every evening., Disp: , Rfl:     cyclobenzaprine (FLEXERIL) 10 MG tablet, Take 1 tablet (10 mg total) by mouth 3 (three) times daily as needed for Muscle spasms., Disp: 270 tablet, Rfl: 0    DULoxetine (CYMBALTA) 60 MG capsule, , Disp: , Rfl:     esomeprazole (NEXIUM) 40 MG capsule, Take 40 mg by mouth., Disp: , Rfl:     furosemide (LASIX) 40 MG tablet, Take 1 tablet (40 mg total) by mouth 2 (two) times a day., Disp: 180 tablet, Rfl: 0    HYDROcodone-acetaminophen (NORCO) 7.5-325 mg per tablet, Take 1 tablet by mouth 2 (two) times daily as needed for Pain., Disp: , Rfl:     losartan (COZAAR) 25 MG tablet, Take 1 tablet (25 mg total) by mouth once daily., Disp: 90 tablet, Rfl: 3    losartan-hydrochlorothiazide 50-12.5 mg (HYZAAR) 50-12.5 mg per tablet, Take 1 tablet by mouth every morning., Disp: 90 tablet, Rfl: 1    lutein 40 mg Cap, Take 1 capsule by mouth every morning., Disp: , Rfl:     metFORMIN (GLUCOPHAGE) 500 MG tablet, Take 1 tablet (500 mg total) by mouth 2 (two) times daily with meals., Disp: 180 tablet, Rfl: 3    metroNIDAZOLE (METROGEL) 0.75 % gel, Apply topically 2 (two) times daily., Disp: 135 g, Rfl: 0    MYRBETRIQ 50 mg  Tb24, Take 1 tablet by mouth., Disp: , Rfl:     polyethylene glycol (GLYCOLAX) 17 gram PwPk, Take 17 g by mouth every evening., Disp: , Rfl:     potassium chloride SA (K-DUR,KLOR-CON) 20 MEQ tablet, TAKE 1 TABLET TWICE DAILY, Disp: 180 tablet, Rfl: 3    pramipexole (MIRAPEX) 0.125 MG tablet, Take 1 tablet (0.125 mg total) by mouth every evening., Disp: 90 tablet, Rfl: 0    pregabalin (LYRICA) 150 MG capsule, Take 150 mg by mouth 2 (two) times a day., Disp: , Rfl:     simvastatin (ZOCOR) 20 MG tablet, Take 1 tablet (20 mg total) by mouth every evening., Disp: 90 tablet, Rfl: 0    valACYclovir (VALTREX) 500 MG tablet, Take 500 mg by mouth daily as needed., Disp: , Rfl:   No current facility-administered medications for this encounter.    Facility-Administered Medications Ordered in Other Encounters:     lactated ringers infusion, , Intravenous, Continuous, Jayjay Lagunas DO, Last Rate: 10 mL/hr at 09/09/22 0945, New Bag at 09/09/22 0945    lactated ringers infusion, , Intravenous, Continuous, Jayjay Lagunas DO, Last Rate: 10 mL/hr at 05/05/23 0713, New Bag at 05/05/23 0713   Review of patient's allergies indicates:   Allergen Reactions    Latex Rash    Adhesive tape-silicones Blisters    Ciprofloxacin        DIAGNOSTICS      Labs/Scans/Micro:    CBC:   Lab Results   Component Value Date    WBC 5.5 10/31/2022    HGB 12.3 10/31/2022    HCT 38.8 10/31/2022    MCV 92.2 10/31/2022     10/31/2022       Sedimentation rate: No results found for: SEDRATE C-reactive protein: No results found for: CRP Chemistry: [unfilled]  HBA1C: No components found for: HBA1C    Ankle Brachial Index: No results found for this or any previous visit.      Imaging:    Plain film: No results found for this or any previous visit.    MRI: No results found for this or any previous visit.   No results found for this or any previous visit.    Bone Scan: No results found for this or any previous visit.   No results found for this or any  previous visit.      Vascular studies: scheduled 8/7/23 @ 8:30 am    Microbiology: Culture obtained 7/17/23    [facility]  TIMES PER WEEK/DAYS:  1  WOUND CARE ORDERS:   Right lower leg wound: Cleanse with wound cleanser, apply mupirocin ointment to wound, cover with mepilex foam, and coban to be changed daily.  HH is not to change orders without expressed consent from provider.       Follow up in about 1 week (around 7/31/2023) for Right lower leg .

## 2023-07-31 ENCOUNTER — HOSPITAL ENCOUNTER (OUTPATIENT)
Dept: WOUND CARE | Facility: HOSPITAL | Age: 63
Discharge: HOME | End: 2023-07-31
Attending: NURSE PRACTITIONER
Payer: MEDICARE

## 2023-07-31 VITALS
WEIGHT: 293 LBS | BODY MASS INDEX: 53.92 KG/M2 | HEART RATE: 77 BPM | RESPIRATION RATE: 18 BRPM | DIASTOLIC BLOOD PRESSURE: 76 MMHG | HEIGHT: 62 IN | SYSTOLIC BLOOD PRESSURE: 165 MMHG

## 2023-07-31 DIAGNOSIS — I10 ESSENTIAL HYPERTENSION: ICD-10-CM

## 2023-07-31 DIAGNOSIS — E78.00 PURE HYPERCHOLESTEROLEMIA: ICD-10-CM

## 2023-07-31 DIAGNOSIS — G89.4 CHRONIC PAIN SYNDROME: Primary | ICD-10-CM

## 2023-07-31 DIAGNOSIS — S81.801D WOUND OF RIGHT LOWER EXTREMITY, SUBSEQUENT ENCOUNTER: Primary | ICD-10-CM

## 2023-07-31 DIAGNOSIS — G25.81 RESTLESS LEGS: ICD-10-CM

## 2023-07-31 PROCEDURE — 99212 OFFICE O/P EST SF 10 MIN: CPT

## 2023-07-31 PROCEDURE — 27000999 HC MEDICAL RECORD PHOTO DOCUMENTATION

## 2023-07-31 NOTE — PROGRESS NOTES
Subjective:       Patient ID: Kimberly Agarwal is a 63 y.o. female.    Chief Complaint: Non-healing Wound (Right lower leg )    HPI  Ms. Agarwal returns to clinic today for follow up on an area of concern at the right lower leg.  She reports that she fell in October, 2022, cutting her right lower leg on a jagged piece of PVC piping.  She did present to the [place] ED at that time, the wound was debrided and stitched.    She states that she completed 3 rounds of oral abx after this and the wound was healed.   She saw her PCP on 6/20/23 complaining that the wound to right lower leg had opened back up on 6/17/23, after swimming and being bit by a mosquito.   She was prescribed Mupirocin to use on the wound, her home health nurse had her d/c the Mupirocin and start silver alginate. (Without an order)  Her last A1C was 5.7 on 6/21/23, she states that she is not a diabetic but has been instructed to monitor her sugar daily, today it was 101. She has never had vascular ultrasounds. She does not have a cardiologist or vascular doctor.     Today the area was assessed and there was a thin layer of fibrin over an open area.  A small amount of purulent exudate was expressed using a martini curette.  A culture was obtained from the site.    There is a very small tiny open wound.   There is no tenderness or surrounding erythema.  There is no fluctuance.  However, there is some depth which does extend to 0.6 cm.  The patient will be notified of her culture results once they received and she will be prescribed medication if indicated.  She was advised that for the time being she is to continue using the mupirocin regardless of what Home Health has instructed her to do.    7/17/23 - The patient today for followup on a small area at the right lower leg.  We did review together the culture results from her initial visit which showed 100% Cutibacterium, with no pharmacy guidance provided.  She will not be receiving an antibiotic  today although PAP did provide a recommendation.  Upon assessment, the initial wound is improved.  However, she does have a complaint about an adjacent area that is also swollen. An I & D was performed at that adjacent area using a #15 scalpel.    A hard head was removed and once the head was removed A moderate amount of purulent exudate was expressed.  This exudate was cultured in an effort to determine if it is the same bacteria or if staph is involved.  For the time being, she will continue to apply mupirocin to the entire area and cover with a bandage.    7/24/23 - Ms. Agarwal turns today for followup on the small wound to the right lower leg.  Today, she has again an increase in pain.  At the last visit a 2nd culture was obtained and this culture shows no pathogens present in spite of their being a small amount of purulent exudate after the wound was I & D'd.  Today, she reports that there has still been a small amount of drainage, however, this was not seen at today's visit.  We will be ordering an ultrasound of the right lower leg to determine if any foreign body can be detected in the wound that may be causing this recurrent pain, inflammation, and drainage.  Additionally, we will order bilateral lower extremity cardiovascular ultrasounds to ensure that she has adequate vascular support for healing.  In the meantime she will continue to apply mupirocin ointment.  Additionally, she was advised that she should apply Ichthammol drawing salve to the area nightly.  That product is available over-the-counter at her local pharmacy.    7/31/23 - the patient returns today for followup on the area of concern to the right lower leg.  Today, there remains a very small opening.  The entire area was palpated and there did not feel or appear to be any areas concern, no drainage, no pinpoint areas of pustule, etc.  She is scheduled for ultrasounds on Monday, 08/07/2023 at which time she will have arterial and venous  ultrasounds as well as an ultrasound of this leg to try to determine if there is any foreign body in the flesh.    Of not, the patient has also been using Ichthammol drawing salve as recommended and reports no issues since starting.     Review of Systems   Constitutional: Negative.    Respiratory: Negative.     Cardiovascular: Negative.    Skin:         As documented in the HPI.   All other systems reviewed and are negative.        Objective:      Vitals:    07/31/23 0927   BP: (!) 165/76   Pulse: 77   Resp: 18       Physical Exam  Vitals reviewed.   Constitutional:       Appearance: Normal appearance.   Cardiovascular:      Rate and Rhythm: Normal rate.   Pulmonary:      Effort: Pulmonary effort is normal.   Skin:     General: Skin is warm and dry.      Comments: Small open wound, right lower leg   Neurological:      Mental Status: She is alert.            Altered Skin Integrity 07/03/23 0955 Right lower Leg #1 (Active)   07/03/23 0955   Altered Skin Integrity Present on Admission - Did Patient arrive to the hospital with altered skin?: yes   Side: Right   Orientation: lower   Location: Leg   Wound Number: #1   Is this injury device related?:    Primary Wound Type:    Description of Altered Skin Integrity:    Ankle-Brachial Index:    Pulses:    Removal Indication and Assessment:    Wound Outcome:    (Retired) Wound Length (cm):    (Retired) Wound Width (cm):    (Retired) Depth (cm):    Wound Description (Comments):    Removal Indications:    Dressing Appearance Moist drainage 07/31/23 0928   Drainage Amount Moderate 07/31/23 0928   Drainage Characteristics/Odor Serosanguineous 07/31/23 0928   Appearance Pink;Moist 07/31/23 0928   Tissue loss description Full thickness 07/31/23 0928   Periwound Area Intact 07/31/23 0928   Wound Edges Open 07/31/23 0928   Wound Length (cm) 0.2 cm 07/31/23 0928   Wound Width (cm) 0.4 cm 07/31/23 0928   Wound Depth (cm) 0.2 cm 07/31/23 0928   Wound Volume (cm^3) 0.016 cm^3 07/31/23  0928   Wound Surface Area (cm^2) 0.08 cm^2 07/31/23 0928   Care Cleansed with:;Wound cleanser 07/31/23 0928   Dressing Applied 07/31/23 0928   Dressing Change Due 08/01/23 07/31/23 0928 07/31/23    Right lower leg   (Mepilex foam, coban)       Assessment:         ICD-10-CM ICD-9-CM   1. Wound of right lower extremity, subsequent encounter  S81.801D V58.89     894.0           Procedures:     Mechanical debridement only    [] Yes [] No   I & D performed  [] Yes [] No   Excisional debridement performed  [] Yes [] No   Selective debridement performed      [x] Yes [] No   Mechanical debridement performed         [] Yes [] No  Silver nitrate applied                                     [] Yes [] No  Labs ordered this visit                                  [] Yes [] No   Imaging ordered this visit                           [] Yes [] No   Tissue pathology and/or culture taken    MEDICATIONS    Current Outpatient Medications:     calcium carbonate/vitamin D3 (CALTRATE 600 PLUS D ORAL), Take 1 tablet by mouth 2 (two) times daily., Disp: , Rfl:     cetirizine (ZYRTEC) 10 MG tablet, Take 10 mg by mouth every evening., Disp: , Rfl:     cyclobenzaprine (FLEXERIL) 10 MG tablet, Take 1 tablet (10 mg total) by mouth 3 (three) times daily as needed for Muscle spasms., Disp: 270 tablet, Rfl: 0    DULoxetine (CYMBALTA) 60 MG capsule, , Disp: , Rfl:     esomeprazole (NEXIUM) 40 MG capsule, Take 40 mg by mouth., Disp: , Rfl:     furosemide (LASIX) 40 MG tablet, Take 1 tablet (40 mg total) by mouth 2 (two) times a day., Disp: 180 tablet, Rfl: 0    HYDROcodone-acetaminophen (NORCO) 7.5-325 mg per tablet, Take 1 tablet by mouth 2 (two) times daily as needed for Pain., Disp: , Rfl:     losartan (COZAAR) 25 MG tablet, Take 1 tablet (25 mg total) by mouth once daily., Disp: 90 tablet, Rfl: 3    losartan-hydrochlorothiazide 50-12.5 mg (HYZAAR) 50-12.5 mg per tablet, Take 1 tablet by mouth every morning., Disp: 90 tablet, Rfl: 1    lutein  "40 mg Cap, Take 1 capsule by mouth every morning., Disp: , Rfl:     metFORMIN (GLUCOPHAGE) 500 MG tablet, Take 1 tablet (500 mg total) by mouth 2 (two) times daily with meals., Disp: 180 tablet, Rfl: 3    metroNIDAZOLE (METROGEL) 0.75 % gel, Apply topically 2 (two) times daily., Disp: 135 g, Rfl: 0    MYRBETRIQ 50 mg Tb24, Take 1 tablet by mouth., Disp: , Rfl:     polyethylene glycol (GLYCOLAX) 17 gram PwPk, Take 17 g by mouth every evening., Disp: , Rfl:     potassium chloride SA (K-DUR,KLOR-CON) 20 MEQ tablet, TAKE 1 TABLET TWICE DAILY, Disp: 180 tablet, Rfl: 3    pramipexole (MIRAPEX) 0.125 MG tablet, Take 1 tablet (0.125 mg total) by mouth every evening., Disp: 90 tablet, Rfl: 0    pregabalin (LYRICA) 150 MG capsule, Take 150 mg by mouth 2 (two) times a day., Disp: , Rfl:     simvastatin (ZOCOR) 20 MG tablet, Take 1 tablet (20 mg total) by mouth every evening., Disp: 90 tablet, Rfl: 0    valACYclovir (VALTREX) 500 MG tablet, Take 500 mg by mouth daily as needed., Disp: , Rfl:     betamethasone valerate 0.1% (VALISONE) 0.1 % Crea, Apply topically once daily. (Patient not taking: Reported on 7/31/2023), Disp: 45 g, Rfl: 3  No current facility-administered medications for this encounter.    Facility-Administered Medications Ordered in Other Encounters:     lactated ringers infusion, , Intravenous, Continuous, Jayjay Lagunas DO, Last Rate: 10 mL/hr at 09/09/22 0945, New Bag at 09/09/22 0945    lactated ringers infusion, , Intravenous, Continuous, Jayjay Lagunas DO, Last Rate: 10 mL/hr at 05/05/23 0713, New Bag at 05/05/23 0713   Review of patient's allergies indicates:   Allergen Reactions    Latex Rash    Adhesive tape-silicones Blisters    Ciprofloxacin        DIAGNOSTICS      Labs/Scans/Micro:    CBC:   Lab Results   Component Value Date    WBC 5.5 10/31/2022    HGB 12.3 10/31/2022    HCT 38.8 10/31/2022    MCV 92.2 10/31/2022     10/31/2022       Sedimentation rate: No results found for: "SEDRATE" " "C-reactive protein: No results found for: "CRP" Chemistry: [unfilled]  HBA1C: No components found for: "HBA1C"    Ankle Brachial Index: No results found for this or any previous visit.    Imaging:    Plain film: No results found for this or any previous visit.    MRI: No results found for this or any previous visit.    Bone Scan: No results found for this or any previous visit.      Vascular studies: scheduled 8/7/23 @ 8:30 am    Microbiology: Culture obtained 7/17/23    [facility]ENC[facility]  TIMES PER WEEK/DAYS: once weekly   WOUND CARE ORDERS:   Right lower leg wound: Cleanse with wound cleanser, apply mupirocin ointment to wound, cover with mepilex foam, and coban to be changed daily.    Home health may discharge at next visit      Follow up in about 2 weeks (around 8/14/2023) for Right lower leg .       "

## 2023-08-01 RX ORDER — SIMVASTATIN 20 MG/1
20 TABLET, FILM COATED ORAL NIGHTLY
Qty: 90 TABLET | Refills: 1 | Status: SHIPPED | OUTPATIENT
Start: 2023-08-01 | End: 2024-03-26

## 2023-08-01 RX ORDER — FUROSEMIDE 40 MG/1
40 TABLET ORAL 2 TIMES DAILY
Qty: 180 TABLET | Refills: 1 | Status: SHIPPED | OUTPATIENT
Start: 2023-08-01

## 2023-08-01 RX ORDER — LOSARTAN POTASSIUM AND HYDROCHLOROTHIAZIDE 12.5; 5 MG/1; MG/1
1 TABLET ORAL EVERY MORNING
Qty: 90 TABLET | Refills: 1 | OUTPATIENT
Start: 2023-08-01

## 2023-08-01 RX ORDER — CYCLOBENZAPRINE HCL 10 MG
10 TABLET ORAL
Qty: 270 TABLET | Refills: 1 | Status: SHIPPED | OUTPATIENT
Start: 2023-08-01 | End: 2024-03-26

## 2023-08-01 RX ORDER — PRAMIPEXOLE DIHYDROCHLORIDE 0.12 MG/1
0.12 TABLET ORAL NIGHTLY
Qty: 90 TABLET | Refills: 1 | Status: SHIPPED | OUTPATIENT
Start: 2023-08-01 | End: 2024-03-26

## 2023-08-07 ENCOUNTER — HOSPITAL ENCOUNTER (OUTPATIENT)
Dept: RADIOLOGY | Facility: HOSPITAL | Age: 63
Discharge: HOME | End: 2023-08-07
Attending: NURSE PRACTITIONER
Payer: MEDICARE

## 2023-08-07 ENCOUNTER — TELEPHONE (OUTPATIENT)
Dept: FAMILY MEDICINE | Facility: CLINIC | Age: 63
End: 2023-08-07
Payer: MEDICARE

## 2023-08-07 DIAGNOSIS — L08.9 DIABETIC FOOT INFECTION: ICD-10-CM

## 2023-08-07 DIAGNOSIS — I10 ESSENTIAL HYPERTENSION: ICD-10-CM

## 2023-08-07 DIAGNOSIS — E11.628 DIABETIC FOOT INFECTION: ICD-10-CM

## 2023-08-07 PROCEDURE — 93926 LOWER EXTREMITY STUDY: CPT | Mod: TC,LT

## 2023-08-07 PROCEDURE — 93970 EXTREMITY STUDY: CPT | Mod: TC

## 2023-08-07 PROCEDURE — 93926 LOWER EXTREMITY STUDY: CPT | Mod: TC,RT

## 2023-08-07 RX ORDER — LOSARTAN POTASSIUM 25 MG/1
25 TABLET ORAL DAILY
Qty: 90 TABLET | Refills: 3 | Status: SHIPPED | OUTPATIENT
Start: 2023-08-07 | End: 2024-03-05

## 2023-08-07 NOTE — TELEPHONE ENCOUNTER
----- Message from Flora Markham sent at 8/7/2023  2:14 PM CDT -----  Regarding: refill  losartan (COZAAR) 25 MG tablet send to [facility]

## 2023-08-14 ENCOUNTER — HOSPITAL ENCOUNTER (OUTPATIENT)
Dept: WOUND CARE | Facility: HOSPITAL | Age: 63
Discharge: HOME | End: 2023-08-14
Attending: NURSE PRACTITIONER
Payer: MEDICARE

## 2023-08-14 VITALS
HEART RATE: 74 BPM | SYSTOLIC BLOOD PRESSURE: 144 MMHG | HEIGHT: 62 IN | BODY MASS INDEX: 53.92 KG/M2 | RESPIRATION RATE: 18 BRPM | WEIGHT: 293 LBS | DIASTOLIC BLOOD PRESSURE: 67 MMHG

## 2023-08-14 DIAGNOSIS — R22.41 LOCALIZED SWELLING, MASS AND LUMP, RIGHT LOWER LIMB: ICD-10-CM

## 2023-08-14 DIAGNOSIS — S81.801D WOUND OF RIGHT LOWER EXTREMITY, SUBSEQUENT ENCOUNTER: Primary | ICD-10-CM

## 2023-08-14 PROCEDURE — 99212 OFFICE O/P EST SF 10 MIN: CPT

## 2023-08-14 PROCEDURE — 27000999 HC MEDICAL RECORD PHOTO DOCUMENTATION

## 2023-08-14 NOTE — PROGRESS NOTES
Subjective:       Patient ID: Kimberly Agarwal is a 63 y.o. female.    Chief Complaint: Non-healing Wound Follow Up (Right lower leg )    HPI  Ms. Agarwal returns to clinic today for follow up on an area of concern at the right lower leg.  She reports that she fell in October, 2022, cutting her right lower leg on a jagged piece of PVC piping.  She did present to the [facility] ED at that time, the wound was debrided and stitched.    She states that she completed 3 rounds of oral abx after this and the wound was healed.   She saw her PCP on 6/20/23 complaining that the wound to right lower leg had opened back up on 6/17/23, after swimming and being bit by a mosquito.   She was prescribed Mupirocin to use on the wound, her home health nurse had her d/c the Mupirocin and start silver alginate. (Without an order)  Her last A1C was 5.7 on 6/21/23, she states that she is not a diabetic but has been instructed to monitor her sugar daily, today it was 101. She has never had vascular ultrasounds. She does not have a cardiologist or vascular doctor.     Today the area was assessed and there was a thin layer of fibrin over an open area.  A small amount of purulent exudate was expressed using a martini curette.  A culture was obtained from the site.    There is a very small tiny open wound.   There is no tenderness or surrounding erythema.  There is no fluctuance.  However, there is some depth which does extend to 0.6 cm.  The patient will be notified of her culture results once they received and she will be prescribed medication if indicated.  She was advised that for the time being she is to continue using the mupirocin regardless of what Home Health has instructed her to do.    7/17/23 - The patient today for followup on a small area at the right lower leg.  We did review together the culture results from her initial visit which showed 100% Cutibacterium, with no pharmacy guidance provided.  She will not be receiving an  antibiotic today although PAP did provide a recommendation.  Upon assessment, the initial wound is improved.  However, she does have a complaint about an adjacent area that is also swollen. An I & D was performed at that adjacent area using a #15 scalpel.    A hard head was removed and once the head was removed A moderate amount of purulent exudate was expressed.  This exudate was cultured in an effort to determine if it is the same bacteria or if staph is involved.  For the time being, she will continue to apply mupirocin to the entire area and cover with a bandage.    7/24/23 - Ms. Agarwal turns today for followup on the small wound to the right lower leg.  Today, she has again an increase in pain.  At the last visit a 2nd culture was obtained and this culture shows no pathogens present in spite of their being a small amount of purulent exudate after the wound was I & D'd.  Today, she reports that there has still been a small amount of drainage, however, this was not seen at today's visit.  We will be ordering an ultrasound of the right lower leg to determine if any foreign body can be detected in the wound that may be causing this recurrent pain, inflammation, and drainage.  Additionally, we will order bilateral lower extremity cardiovascular ultrasounds to ensure that she has adequate vascular support for healing.  In the meantime she will continue to apply mupirocin ointment.  Additionally, she was advised that she should apply Ichthammol drawing salve to the area nightly.  That product is available over-the-counter at her local pharmacy.    7/31/23 - the patient returns today for followup on the area of concern to the right lower leg.  Today, there remains a very small opening.  The entire area was palpated and there did not feel or appear to be any areas concern, no drainage, no pinpoint areas of pustule, etc.  She is scheduled for ultrasounds on Monday, 08/07/2023 at which time she will have arterial and  venous ultrasounds as well as an ultrasound of this leg to try to determine if there is any foreign body in the flesh.    Of not, the patient has also been using Ichthammol drawing salve as recommended and reports no issues since starting.     8/14/23 - Ms. Agarwal returns today for followup on the right lower leg.  She reports that there is still some mild pain in the right lower leg and today, there is a very small opening in the epithelial tissue.  A very small amount of exudate was expressed from the area.  She does report that she was continuing to apply mupirocin.  She was encouraged to discontinue the use of that product and to use a Band-Aid only.  She was scheduled to have ultrasounds last week.  She did have the bilateral arterial and venous ultrasounds which were clear.  Those were reviewed today at clinic.  However, they did not ultrasound the right lower leg.  That has been ordered and we will review those results once that imaging is complete.      Review of Systems   Constitutional: Negative.    Respiratory: Negative.     Cardiovascular: Negative.    Skin:         As documented in the HPI.   All other systems reviewed and are negative.        Objective:      Vitals:    08/14/23 1001   BP: (!) 144/67   Pulse: 74   Resp: 18       Physical Exam  Vitals reviewed.   Constitutional:       Appearance: Normal appearance.   Cardiovascular:      Rate and Rhythm: Normal rate.   Pulmonary:      Effort: Pulmonary effort is normal.   Skin:     General: Skin is warm and dry.      Comments: Small open wound, right lower leg   Neurological:      Mental Status: She is alert.            Altered Skin Integrity 07/03/23 0955 Right lower Leg #1 (Active)   07/03/23 0955   Altered Skin Integrity Present on Admission - Did Patient arrive to the hospital with altered skin?: yes   Side: Right   Orientation: lower   Location: Leg   Wound Number: #1   Is this injury device related?:    Primary Wound Type:    Description of  Altered Skin Integrity:    Ankle-Brachial Index:    Pulses:    Removal Indication and Assessment:    Wound Outcome:    (Retired) Wound Length (cm):    (Retired) Wound Width (cm):    (Retired) Depth (cm):    Wound Description (Comments):    Removal Indications:    Dressing Appearance Moist drainage 08/14/23 1002   Drainage Amount Moderate 08/14/23 1002   Drainage Characteristics/Odor Serosanguineous 08/14/23 1002   Appearance Pink;Moist 08/14/23 1002   Tissue loss description Full thickness 08/14/23 1002   Periwound Area Intact;Redness 08/14/23 1002   Wound Edges Open 08/14/23 1002   Wound Length (cm) 0.2 cm 08/14/23 1002   Wound Width (cm) 0.2 cm 08/14/23 1002   Wound Depth (cm) 0.2 cm 08/14/23 1002   Wound Volume (cm^3) 0.008 cm^3 08/14/23 1002   Wound Surface Area (cm^2) 0.04 cm^2 08/14/23 1002   Care Cleansed with:;Wound cleanser 08/14/23 1002   Dressing Applied 08/14/23 1002   Dressing Change Due 08/15/23 08/14/23 1002         08/14/23    Right lower leg   ([place] dressing)         Assessment:         ICD-10-CM ICD-9-CM   1. Wound of right lower extremity, subsequent encounter  S81.801D V58.89     894.0   2. Localized swelling, mass and lump, right lower limb  R22.41 782.2         Procedures:     Mechanical debridement only    [] Yes [] No   I & D performed  [] Yes [] No   Excisional debridement performed  [] Yes [] No   Selective debridement performed      [x] Yes [] No   Mechanical debridement performed         [] Yes [] No  Silver nitrate applied                                     [] Yes [] No  Labs ordered this visit                                  [] Yes [] No   Imaging ordered this visit                           [] Yes [] No   Tissue pathology and/or culture taken    MEDICATIONS    Current Outpatient Medications:     calcium carbonate/vitamin D3 (CALTRATE 600 PLUS D ORAL), Take 1 tablet by mouth 2 (two) times daily., Disp: , Rfl:     cetirizine (ZYRTEC) 10 MG tablet, Take 10 mg by mouth every evening.,  Disp: , Rfl:     cyclobenzaprine (FLEXERIL) 10 MG tablet, TAKE 1 TABLET THREE TIMES DAILY AS NEEDED FOR MUSCLE SPASM(S), Disp: 270 tablet, Rfl: 1    DULoxetine (CYMBALTA) 60 MG capsule, , Disp: , Rfl:     esomeprazole (NEXIUM) 40 MG capsule, Take 40 mg by mouth., Disp: , Rfl:     furosemide (LASIX) 40 MG tablet, TAKE 1 TABLET TWICE DAILY, Disp: 180 tablet, Rfl: 1    HYDROcodone-acetaminophen (NORCO) 7.5-325 mg per tablet, Take 1 tablet by mouth 2 (two) times daily as needed for Pain., Disp: , Rfl:     losartan (COZAAR) 25 MG tablet, Take 1 tablet (25 mg total) by mouth once daily., Disp: 90 tablet, Rfl: 3    losartan-hydrochlorothiazide 50-12.5 mg (HYZAAR) 50-12.5 mg per tablet, Take 1 tablet by mouth every morning., Disp: 90 tablet, Rfl: 1    lutein 40 mg Cap, Take 1 capsule by mouth every morning., Disp: , Rfl:     metFORMIN (GLUCOPHAGE) 500 MG tablet, Take 1 tablet (500 mg total) by mouth 2 (two) times daily with meals., Disp: 180 tablet, Rfl: 3    metroNIDAZOLE (METROGEL) 0.75 % gel, Apply topically 2 (two) times daily., Disp: 135 g, Rfl: 0    MYRBETRIQ 50 mg Tb24, Take 1 tablet by mouth., Disp: , Rfl:     polyethylene glycol (GLYCOLAX) 17 gram PwPk, Take 17 g by mouth every evening., Disp: , Rfl:     potassium chloride SA (K-DUR,KLOR-CON) 20 MEQ tablet, TAKE 1 TABLET TWICE DAILY, Disp: 180 tablet, Rfl: 3    pramipexole (MIRAPEX) 0.125 MG tablet, TAKE 1 TABLET EVERY EVENING, Disp: 90 tablet, Rfl: 1    pregabalin (LYRICA) 150 MG capsule, Take 150 mg by mouth 2 (two) times a day., Disp: , Rfl:     simvastatin (ZOCOR) 20 MG tablet, TAKE 1 TABLET EVERY EVENING, Disp: 90 tablet, Rfl: 1    valACYclovir (VALTREX) 500 MG tablet, Take 500 mg by mouth daily as needed., Disp: , Rfl:     betamethasone valerate 0.1% (VALISONE) 0.1 % Crea, Apply topically once daily. (Patient not taking: Reported on 7/31/2023), Disp: 45 g, Rfl: 3  No current facility-administered medications for this encounter.    Facility-Administered  "Medications Ordered in Other Encounters:     lactated ringers infusion, , Intravenous, Continuous, Jayjay Lagunas DO, Last Rate: 10 mL/hr at 09/09/22 0945, New Bag at 09/09/22 0945    lactated ringers infusion, , Intravenous, Continuous, Jayjay Lagunas DO, Last Rate: 10 mL/hr at 05/05/23 0713, New Bag at 05/05/23 0713   Review of patient's allergies indicates:   Allergen Reactions    Latex Rash    Adhesive tape-silicones Blisters    Ciprofloxacin        DIAGNOSTICS      Labs/Scans/Micro:    CBC:   Lab Results   Component Value Date    WBC 5.5 10/31/2022    HGB 12.3 10/31/2022    HCT 38.8 10/31/2022    MCV 92.2 10/31/2022     10/31/2022       Sedimentation rate: No results found for: "SEDRATE" C-reactive protein: No results found for: "CRP" Chemistry: [unfilled]  HBA1C: No components found for: "HBA1C"    Ankle Brachial Index: No results found for this or any previous visit.    Imaging:    Plain film: No results found for this or any previous visit.    MRI: No results found for this or any previous visit.    Bone Scan: No results found for this or any previous visit.      Vascular studies: scheduled 8/7/23 @ 8:30 am    Microbiology: Culture obtained 7/17/23    [facility]  TIMES PER WEEK/DAYS: once weekly   WOUND CARE ORDERS:   Right lower leg wound: Keep clean and dry, cover wound with band aid.       Follow up in about 1 week (around 8/21/2023) for Right lower leg wound .       "

## 2023-08-21 DIAGNOSIS — I10 ESSENTIAL HYPERTENSION: ICD-10-CM

## 2023-08-21 RX ORDER — LOSARTAN POTASSIUM AND HYDROCHLOROTHIAZIDE 12.5; 5 MG/1; MG/1
1 TABLET ORAL DAILY
Qty: 90 TABLET | Refills: 3 | Status: SHIPPED | OUTPATIENT
Start: 2023-08-21 | End: 2024-08-20

## 2023-08-23 ENCOUNTER — DOCUMENTATION ONLY (OUTPATIENT)
Dept: FAMILY MEDICINE | Facility: CLINIC | Age: 63
End: 2023-08-23
Payer: MEDICARE

## 2023-08-24 ENCOUNTER — HOSPITAL ENCOUNTER (OUTPATIENT)
Dept: RADIOLOGY | Facility: HOSPITAL | Age: 63
Discharge: HOME | End: 2023-08-24
Attending: NURSE PRACTITIONER
Payer: MEDICARE

## 2023-08-24 ENCOUNTER — HOSPITAL ENCOUNTER (OUTPATIENT)
Dept: WOUND CARE | Facility: HOSPITAL | Age: 63
Discharge: HOME | End: 2023-08-24
Attending: NURSE PRACTITIONER
Payer: MEDICARE

## 2023-08-24 VITALS
SYSTOLIC BLOOD PRESSURE: 137 MMHG | DIASTOLIC BLOOD PRESSURE: 65 MMHG | RESPIRATION RATE: 18 BRPM | HEART RATE: 85 BPM | BODY MASS INDEX: 53.92 KG/M2 | HEIGHT: 62 IN | WEIGHT: 293 LBS

## 2023-08-24 DIAGNOSIS — S81.801D WOUND OF RIGHT LOWER EXTREMITY, SUBSEQUENT ENCOUNTER: ICD-10-CM

## 2023-08-24 DIAGNOSIS — S81.801D WOUND OF RIGHT LOWER EXTREMITY, SUBSEQUENT ENCOUNTER: Primary | ICD-10-CM

## 2023-08-24 DIAGNOSIS — R22.41 LOCALIZED SWELLING, MASS AND LUMP, RIGHT LOWER LIMB: ICD-10-CM

## 2023-08-24 PROCEDURE — 99212 OFFICE O/P EST SF 10 MIN: CPT | Mod: 25

## 2023-08-24 PROCEDURE — 27000999 HC MEDICAL RECORD PHOTO DOCUMENTATION

## 2023-08-24 PROCEDURE — 97597 DBRDMT OPN WND 1ST 20 CM/<: CPT

## 2023-08-24 PROCEDURE — 73700 CT LOWER EXTREMITY W/O DYE: CPT | Mod: TC,RT

## 2023-08-24 NOTE — PROGRESS NOTES
Subjective:       Patient ID: Kimberly Agarwal is a 63 y.o. female.    Chief Complaint: Wound Care (Right lower leg )    HPI  Ms. Agarwal returns to clinic today for follow up on an area of concern at the right lower leg.  She reports that she fell in October, 2022, cutting her right lower leg on a jagged piece of PVC piping.  She did present to the [facility] ED at that time, the wound was debrided and stitched.    She states that she completed 3 rounds of oral abx after this and the wound was healed.   She saw her PCP on 6/20/23 complaining that the wound to right lower leg had opened back up on 6/17/23, after swimming and being bit by a mosquito.   She was prescribed Mupirocin to use on the wound, her home health nurse had her d/c the Mupirocin and start silver alginate. (Without an order)  Her last A1C was 5.7 on 6/21/23, she states that she is not a diabetic but has been instructed to monitor her sugar daily, today it was 101. She has never had vascular ultrasounds. She does not have a cardiologist or vascular doctor.     Today the area was assessed and there was a thin layer of fibrin over an open area.  A small amount of purulent exudate was expressed using a martini curette.  A culture was obtained from the site.    There is a very small tiny open wound.   There is no tenderness or surrounding erythema.  There is no fluctuance.  However, there is some depth which does extend to 0.6 cm.  The patient will be notified of her culture results once they received and she will be prescribed medication if indicated.  She was advised that for the time being she is to continue using the mupirocin regardless of what Home Health has instructed her to do.    7/17/23 - The patient today for followup on a small area at the right lower leg.  We did review together the culture results from her initial visit which showed 100% Cutibacterium, with no pharmacy guidance provided.  She will not be receiving an antibiotic today  although PAP did provide a recommendation.  Upon assessment, the initial wound is improved.  However, she does have a complaint about an adjacent area that is also swollen. An I & D was performed at that adjacent area using a #15 scalpel.    A hard head was removed and once the head was removed A moderate amount of purulent exudate was expressed.  This exudate was cultured in an effort to determine if it is the same bacteria or if staph is involved.  For the time being, she will continue to apply mupirocin to the entire area and cover with a bandage.    7/24/23 - Ms. Agarwal turns today for followup on the small wound to the right lower leg.  Today, she has again an increase in pain.  At the last visit a 2nd culture was obtained and this culture shows no pathogens present in spite of their being a small amount of purulent exudate after the wound was I & D'd.  Today, she reports that there has still been a small amount of drainage, however, this was not seen at today's visit.  We will be ordering an ultrasound of the right lower leg to determine if any foreign body can be detected in the wound that may be causing this recurrent pain, inflammation, and drainage.  Additionally, we will order bilateral lower extremity cardiovascular ultrasounds to ensure that she has adequate vascular support for healing.  In the meantime she will continue to apply mupirocin ointment.  Additionally, she was advised that she should apply Ichthammol drawing salve to the area nightly.  That product is available over-the-counter at her local pharmacy.    7/31/23 - the patient returns today for followup on the area of concern to the right lower leg.  Today, there remains a very small opening.  The entire area was palpated and there did not feel or appear to be any areas concern, no drainage, no pinpoint areas of pustule, etc.  She is scheduled for ultrasounds on Monday, 08/07/2023 at which time she will have arterial and venous ultrasounds  as well as an ultrasound of this leg to try to determine if there is any foreign body in the flesh.    Of not, the patient has also been using Ichthammol drawing salve as recommended and reports no issues since starting.     8/14/23 - Ms. Agarwal returns today for followup on the right lower leg.  She reports that there is still some mild pain in the right lower leg and today, there is a very small opening in the epithelial tissue.  A very small amount of exudate was expressed from the area.  She does report that she was continuing to apply mupirocin.  She was encouraged to discontinue the use of that product and to use a Band-Aid only.  She was scheduled to have ultrasounds last week.  She did have the bilateral arterial and venous ultrasounds which were clear.  Those were reviewed today at clinic.  However, they did not ultrasound the right lower leg.  That has been ordered and we will review those results once that imaging is complete.    8/24/23 - The patient returns today for f/up on the right lower leg.  Today there was a layer of fibrin across the wound bed which was selectively debrided off.  Upon removal of the fibrin a pocket of exudate was expressed.  This exudate was cultured again.  It has been cultured previously with no pathogens noted.  We will culture one additional time to determine if pathogens are present.   Additionally She did have a CT done this morning of the area.  Results are not yet ready.       Review of Systems   Constitutional: Negative.    Respiratory: Negative.     Cardiovascular: Negative.    Skin:         As documented in the HPI.   All other systems reviewed and are negative.        Objective:      Vitals:    08/24/23 0927   BP: 137/65   Pulse: 85   Resp: 18     Physical Exam  Vitals reviewed.   Constitutional:       Appearance: Normal appearance.   Cardiovascular:      Rate and Rhythm: Normal rate.   Pulmonary:      Effort: Pulmonary effort is normal.   Skin:     General: Skin is  "warm and dry.      Comments: Small open wound, right lower leg   Neurological:      Mental Status: She is alert.            Altered Skin Integrity 07/03/23 0955 Right lower Leg #1 (Active)   07/03/23 0955   Altered Skin Integrity Present on Admission - Did Patient arrive to the hospital with altered skin?: yes   Side: Right   Orientation: lower   Location: Leg   Wound Number: #1   Is this injury device related?:    Primary Wound Type:    Description of Altered Skin Integrity:    Ankle-Brachial Index:    Pulses:    Removal Indication and Assessment:    Wound Outcome:    (Retired) Wound Length (cm):    (Retired) Wound Width (cm):    (Retired) Depth (cm):    Wound Description (Comments):    Removal Indications:    Dressing Appearance Moist drainage 08/24/23 0922   Drainage Amount Moderate 08/24/23 0922   Drainage Characteristics/Odor Serosanguineous 08/24/23 0922   Appearance Dry;Eschar 08/24/23 0922   Tissue loss description Full thickness 08/24/23 0922   Periwound Area Purple 08/24/23 0922   Wound Edges Open 08/24/23 0922   Wound Length (cm) 0.2 cm 08/24/23 0922   Wound Width (cm) 0.2 cm 08/24/23 0922   Wound Depth (cm) 0.3 cm 08/24/23 0922   Wound Volume (cm^3) 0.012 cm^3 08/24/23 0922   Wound Surface Area (cm^2) 0.04 cm^2 08/24/23 0922   Care Cleansed with:;Wound cleanser 08/24/23 0922   Dressing Applied 08/24/23 0922   Dressing Change Due 08/25/23 08/24/23 0922     08/24/23    Right lower leg   (Band aid)         Assessment:       No diagnosis found.        Procedures:     Debridement     Date/Time: 8/24/2023 9:09 AM     Performed by: Fatou Patel NP  Authorized by: Fatou Patel NP    Time out: Immediately prior to procedure a "time out" was called to verify the correct patient, procedure, equipment, support staff and site/side marked as required.     Consent Done?:  Yes (Verbal)     Preparation: Patient was prepped and draped with clean technique    Local anesthesia used?: No       Wound Details:    " Location:  Right leg    Type of Debridement:  Non-excisional       Length (cm):  0.2       Area (sq cm):  0.04       Width (cm):  0.2       Percent Debrided (%):  100       Depth (cm):  0.3       Total Area Debrided (sq cm):  0.04    Depth of debridement:  Epidermis/Dermis    Devitalized tissue debrided:  Biofilm, Exudate and Fibrin    Instruments:  Curette (Dermal)  Bleeding:  None  Patient tolerance:  Patient tolerated the procedure well with no immediate complications  Specimen Collected: Specimen sent to microbiology       [] Yes [] No   I & D performed  [] Yes [] No   Excisional debridement performed  [x] Yes [] No   Selective debridement performed      [] Yes [] No   Mechanical debridement performed         [] Yes [] No  Silver nitrate applied                                     [] Yes [] No  Labs ordered this visit                                  [] Yes [] No   Imaging ordered this visit                           [x] Yes [] No   Tissue pathology and/or culture taken    MEDICATIONS    Current Outpatient Medications:     calcium carbonate/vitamin D3 (CALTRATE 600 PLUS D ORAL), Take 1 tablet by mouth 2 (two) times daily., Disp: , Rfl:     cetirizine (ZYRTEC) 10 MG tablet, Take 10 mg by mouth every evening., Disp: , Rfl:     cyclobenzaprine (FLEXERIL) 10 MG tablet, TAKE 1 TABLET THREE TIMES DAILY AS NEEDED FOR MUSCLE SPASM(S), Disp: 270 tablet, Rfl: 1    DULoxetine (CYMBALTA) 60 MG capsule, , Disp: , Rfl:     esomeprazole (NEXIUM) 40 MG capsule, Take 40 mg by mouth., Disp: , Rfl:     furosemide (LASIX) 40 MG tablet, TAKE 1 TABLET TWICE DAILY, Disp: 180 tablet, Rfl: 1    HYDROcodone-acetaminophen (NORCO) 7.5-325 mg per tablet, Take 1 tablet by mouth 2 (two) times daily as needed for Pain., Disp: , Rfl:     losartan (COZAAR) 25 MG tablet, Take 1 tablet (25 mg total) by mouth once daily., Disp: 90 tablet, Rfl: 3    losartan-hydrochlorothiazide 50-12.5 mg (HYZAAR) 50-12.5 mg per tablet, Take 1 tablet by mouth once  "daily., Disp: 90 tablet, Rfl: 3    lutein 40 mg Cap, Take 1 capsule by mouth every morning., Disp: , Rfl:     metFORMIN (GLUCOPHAGE) 500 MG tablet, Take 1 tablet (500 mg total) by mouth 2 (two) times daily with meals., Disp: 180 tablet, Rfl: 3    metroNIDAZOLE (METROGEL) 0.75 % gel, Apply topically 2 (two) times daily., Disp: 135 g, Rfl: 0    MYRBETRIQ 50 mg Tb24, Take 1 tablet by mouth., Disp: , Rfl:     polyethylene glycol (GLYCOLAX) 17 gram PwPk, Take 17 g by mouth every evening., Disp: , Rfl:     potassium chloride SA (K-DUR,KLOR-CON) 20 MEQ tablet, TAKE 1 TABLET TWICE DAILY, Disp: 180 tablet, Rfl: 3    pramipexole (MIRAPEX) 0.125 MG tablet, TAKE 1 TABLET EVERY EVENING, Disp: 90 tablet, Rfl: 1    pregabalin (LYRICA) 150 MG capsule, Take 150 mg by mouth 2 (two) times a day., Disp: , Rfl:     simvastatin (ZOCOR) 20 MG tablet, TAKE 1 TABLET EVERY EVENING, Disp: 90 tablet, Rfl: 1    valACYclovir (VALTREX) 500 MG tablet, Take 500 mg by mouth daily as needed., Disp: , Rfl:     betamethasone valerate 0.1% (VALISONE) 0.1 % Crea, Apply topically once daily. (Patient not taking: Reported on 7/31/2023), Disp: 45 g, Rfl: 3  No current facility-administered medications for this encounter.    Facility-Administered Medications Ordered in Other Encounters:     lactated ringers infusion, , Intravenous, Continuous, Jayjay Lagunas DO, Last Rate: 10 mL/hr at 09/09/22 0945, New Bag at 09/09/22 0945    lactated ringers infusion, , Intravenous, Continuous, Jayjay Lagunas DO, Last Rate: 10 mL/hr at 05/05/23 0713, New Bag at 05/05/23 0713   Review of patient's allergies indicates:   Allergen Reactions    Latex Rash    Adhesive tape-silicones Blisters    Ciprofloxacin        DIAGNOSTICS      Labs/Scans/Micro:    CBC:   Lab Results   Component Value Date    WBC 5.5 10/31/2022    HGB 12.3 10/31/2022    HCT 38.8 10/31/2022    MCV 92.2 10/31/2022     10/31/2022       Sedimentation rate: No results found for: "SEDRATE" C-reactive " "protein: No results found for: "CRP" Chemistry: [unfilled]  HBA1C: No components found for: "HBA1C"    Ankle Brachial Index: No results found for this or any previous visit.    Imaging:    Plain film: No results found for this or any previous visit.    MRI: No results found for this or any previous visit.    Bone Scan: No results found for this or any previous visit.      Vascular studies: scheduled 8/7/23 @ 8:30 am    Microbiology: Culture obtained 7/17/23; culture obtained 8/24/23    [facility]ENC[facility]  TIMES PER WEEK/DAYS: once weekly   WOUND CARE ORDERS:   Right lower leg wound: Keep clean and dry, cover wound with band aid.       Follow up in about 1 week (around 8/31/2023) for Right lower leg wound .       "

## 2023-08-24 NOTE — PROCEDURES
"Debridement    Date/Time: 8/24/2023 9:09 AM    Performed by: Fatou Patel NP  Authorized by: Fatou Patel NP    Time out: Immediately prior to procedure a "time out" was called to verify the correct patient, procedure, equipment, support staff and site/side marked as required.    Consent Done?:  Yes (Verbal)    Preparation: Patient was prepped and draped with clean technique    Local anesthesia used?: No      Wound Details:    Location:  Right leg    Type of Debridement:  Non-excisional       Length (cm):  0.2       Area (sq cm):  0.04       Width (cm):  0.2       Percent Debrided (%):  100       Depth (cm):  0.3       Total Area Debrided (sq cm):  0.04    Depth of debridement:  Epidermis/Dermis    Devitalized tissue debrided:  Biofilm, Exudate and Fibrin    Instruments:  Curette (Dermal)  Bleeding:  None  Patient tolerance:  Patient tolerated the procedure well with no immediate complications  Specimen Collected: Specimen sent to microbiology    "

## 2023-08-31 ENCOUNTER — HOSPITAL ENCOUNTER (OUTPATIENT)
Dept: WOUND CARE | Facility: HOSPITAL | Age: 63
Discharge: HOME | End: 2023-08-31
Attending: NURSE PRACTITIONER
Payer: MEDICARE

## 2023-08-31 VITALS
SYSTOLIC BLOOD PRESSURE: 155 MMHG | DIASTOLIC BLOOD PRESSURE: 75 MMHG | HEART RATE: 75 BPM | RESPIRATION RATE: 18 BRPM | HEIGHT: 62 IN | WEIGHT: 293 LBS | BODY MASS INDEX: 53.92 KG/M2

## 2023-08-31 DIAGNOSIS — R22.41 LOCALIZED SWELLING, MASS AND LUMP, RIGHT LOWER LIMB: ICD-10-CM

## 2023-08-31 DIAGNOSIS — S81.801D WOUND OF RIGHT LOWER EXTREMITY, SUBSEQUENT ENCOUNTER: Primary | ICD-10-CM

## 2023-08-31 PROCEDURE — 99212 OFFICE O/P EST SF 10 MIN: CPT

## 2023-08-31 PROCEDURE — 27000999 HC MEDICAL RECORD PHOTO DOCUMENTATION

## 2023-08-31 NOTE — PROGRESS NOTES
Subjective:       Patient ID: Kimberly Agarwal is a 63 y.o. female.    Chief Complaint: Wound Care (Right lower leg )    HPI  Ms. Agarwal returns to clinic today for follow up on an area of concern at the right lower leg.  She reports that she fell in October, 2022, cutting her right lower leg on a jagged piece of PVC piping.  She did present to the [place] ED at that time, the wound was debrided and stitched.    She states that she completed 3 rounds of oral abx after this and the wound was healed.   She saw her PCP on 6/20/23 complaining that the wound to right lower leg had opened back up on 6/17/23, after swimming and being bit by a mosquito.   She was prescribed Mupirocin to use on the wound, her home health nurse had her d/c the Mupirocin and start silver alginate. (Without an order)  Her last A1C was 5.7 on 6/21/23, she states that she is not a diabetic but has been instructed to monitor her sugar daily, today it was 101. She has never had vascular ultrasounds. She does not have a cardiologist or vascular doctor.     Today the area was assessed and there was a thin layer of fibrin over an open area.  A small amount of purulent exudate was expressed using a martini curette.  A culture was obtained from the site.    There is a very small tiny open wound.   There is no tenderness or surrounding erythema.  There is no fluctuance.  However, there is some depth which does extend to 0.6 cm.  The patient will be notified of her culture results once they received and she will be prescribed medication if indicated.  She was advised that for the time being she is to continue using the mupirocin regardless of what Home Health has instructed her to do.    7/17/23 - The patient today for followup on a small area at the right lower leg.  We did review together the culture results from her initial visit which showed 100% Cutibacterium, with no pharmacy guidance provided.  She will not be receiving an antibiotic today  although PAP did provide a recommendation.  Upon assessment, the initial wound is improved.  However, she does have a complaint about an adjacent area that is also swollen. An I & D was performed at that adjacent area using a #15 scalpel.    A hard head was removed and once the head was removed A moderate amount of purulent exudate was expressed.  This exudate was cultured in an effort to determine if it is the same bacteria or if staph is involved.  For the time being, she will continue to apply mupirocin to the entire area and cover with a bandage.    7/24/23 - Ms. Agarwal turns today for followup on the small wound to the right lower leg.  Today, she has again an increase in pain.  At the last visit a 2nd culture was obtained and this culture shows no pathogens present in spite of their being a small amount of purulent exudate after the wound was I & D'd.  Today, she reports that there has still been a small amount of drainage, however, this was not seen at today's visit.  We will be ordering an ultrasound of the right lower leg to determine if any foreign body can be detected in the wound that may be causing this recurrent pain, inflammation, and drainage.  Additionally, we will order bilateral lower extremity cardiovascular ultrasounds to ensure that she has adequate vascular support for healing.  In the meantime she will continue to apply mupirocin ointment.  Additionally, she was advised that she should apply Ichthammol drawing salve to the area nightly.  That product is available over-the-counter at her local pharmacy.    7/31/23 - the patient returns today for followup on the area of concern to the right lower leg.  Today, there remains a very small opening.  The entire area was palpated and there did not feel or appear to be any areas concern, no drainage, no pinpoint areas of pustule, etc.  She is scheduled for ultrasounds on Monday, 08/07/2023 at which time she will have arterial and venous ultrasounds  as well as an ultrasound of this leg to try to determine if there is any foreign body in the flesh.    Of not, the patient has also been using Ichthammol drawing salve as recommended and reports no issues since starting.     8/14/23 - Ms. Agarwal returns today for followup on the right lower leg.  She reports that there is still some mild pain in the right lower leg and today, there is a very small opening in the epithelial tissue.  A very small amount of exudate was expressed from the area.  She does report that she was continuing to apply mupirocin.  She was encouraged to discontinue the use of that product and to use a Band-Aid only.  She was scheduled to have ultrasounds last week.  She did have the bilateral arterial and venous ultrasounds which were clear.  Those were reviewed today at clinic.  However, they did not ultrasound the right lower leg.  That has been ordered and we will review those results once that imaging is complete.    8/24/23 - The patient returns today for f/up on the right lower leg.  Today there was a layer of fibrin across the wound bed which was selectively debrided off.  Upon removal of the fibrin a pocket of exudate was expressed.  This exudate was cultured again.  It has been cultured previously with no pathogens noted.  We will culture one additional time to determine if pathogens are present.   Additionally She did have a CT done this morning of the area.  Results are not yet ready.     8/31/23 - Ms. Agarwal is seen today for followup on the small area of concern at the right lower leg.  We have had a CT which showed no area of abscess and no foreign body.  We have also reviewed the culture that was obtained on 08/24/2023 and no pathogens were present.  We discussed today that there is no open wound and there is really nothing further that we can do to resolve the issues.  She is to begin applying Ichthammol cream if she has any areas where a white head that develop again and she is  to apply the MediHoney on a daily basis.  She can call to schedule a followup if anything opens on the leg.    Review of Systems   Constitutional: Negative.    Respiratory: Negative.     Cardiovascular: Negative.    Skin:         As documented in the HPI.   All other systems reviewed and are negative.        Objective:      Vitals:    08/31/23 1046   BP: (!) 155/75   Pulse: 75   Resp: 18     Physical Exam  Vitals reviewed.   Constitutional:       Appearance: Normal appearance.   Cardiovascular:      Rate and Rhythm: Normal rate.   Pulmonary:      Effort: Pulmonary effort is normal.   Skin:     General: Skin is warm and dry.      Comments: Small open wound, right lower leg   Neurological:      Mental Status: She is alert.        08/24/23 08/31/23                                                         Right lower leg         Assessment:         ICD-10-CM ICD-9-CM   1. Wound of right lower extremity, subsequent encounter  S81.801D V58.89     894.0   2. Localized swelling, mass and lump, right lower limb  R22.41 782.2           Procedures:     Mechanical Debridement only    [] Yes [] No   I & D performed  [] Yes [] No   Excisional debridement performed  [] Yes [] No   Selective debridement performed      [x] Yes [] No   Mechanical debridement performed         [] Yes [] No  Silver nitrate applied                                     [] Yes [] No  Labs ordered this visit                                  [] Yes [] No   Imaging ordered this visit                           [] Yes [] No   Tissue pathology and/or culture taken    MEDICATIONS    Current Outpatient Medications:     calcium carbonate/vitamin D3 (CALTRATE 600 PLUS D ORAL), Take 1 tablet by mouth 2 (two) times daily., Disp: , Rfl:     cetirizine (ZYRTEC) 10 MG tablet, Take 10 mg by mouth every evening., Disp: , Rfl:     cyclobenzaprine (FLEXERIL) 10 MG tablet, TAKE 1 TABLET THREE TIMES DAILY AS NEEDED FOR  MUSCLE SPASM(S), Disp: 270 tablet, Rfl: 1    DULoxetine (CYMBALTA) 60 MG capsule, , Disp: , Rfl:     esomeprazole (NEXIUM) 40 MG capsule, Take 40 mg by mouth., Disp: , Rfl:     furosemide (LASIX) 40 MG tablet, TAKE 1 TABLET TWICE DAILY, Disp: 180 tablet, Rfl: 1    HYDROcodone-acetaminophen (NORCO) 7.5-325 mg per tablet, Take 1 tablet by mouth 2 (two) times daily as needed for Pain., Disp: , Rfl:     losartan (COZAAR) 25 MG tablet, Take 1 tablet (25 mg total) by mouth once daily., Disp: 90 tablet, Rfl: 3    losartan-hydrochlorothiazide 50-12.5 mg (HYZAAR) 50-12.5 mg per tablet, Take 1 tablet by mouth once daily., Disp: 90 tablet, Rfl: 3    lutein 40 mg Cap, Take 1 capsule by mouth every morning., Disp: , Rfl:     metFORMIN (GLUCOPHAGE) 500 MG tablet, Take 1 tablet (500 mg total) by mouth 2 (two) times daily with meals., Disp: 180 tablet, Rfl: 3    metroNIDAZOLE (METROGEL) 0.75 % gel, Apply topically 2 (two) times daily., Disp: 135 g, Rfl: 0    MYRBETRIQ 50 mg Tb24, Take 1 tablet by mouth., Disp: , Rfl:     polyethylene glycol (GLYCOLAX) 17 gram PwPk, Take 17 g by mouth every evening., Disp: , Rfl:     potassium chloride SA (K-DUR,KLOR-CON) 20 MEQ tablet, TAKE 1 TABLET TWICE DAILY, Disp: 180 tablet, Rfl: 3    pramipexole (MIRAPEX) 0.125 MG tablet, TAKE 1 TABLET EVERY EVENING, Disp: 90 tablet, Rfl: 1    pregabalin (LYRICA) 150 MG capsule, Take 150 mg by mouth 2 (two) times a day., Disp: , Rfl:     simvastatin (ZOCOR) 20 MG tablet, TAKE 1 TABLET EVERY EVENING, Disp: 90 tablet, Rfl: 1    valACYclovir (VALTREX) 500 MG tablet, Take 500 mg by mouth daily as needed., Disp: , Rfl:     betamethasone valerate 0.1% (VALISONE) 0.1 % Crea, Apply topically once daily. (Patient not taking: Reported on 7/31/2023), Disp: 45 g, Rfl: 3  No current facility-administered medications for this encounter.    Facility-Administered Medications Ordered in Other Encounters:     lactated ringers infusion, , Intravenous, Continuous, Jayjay Lagunas  "E, DO, Last Rate: 10 mL/hr at 09/09/22 0945, New Bag at 09/09/22 0945    lactated ringers infusion, , Intravenous, Continuous, Jayjay Lagunas E, DO, Last Rate: 10 mL/hr at 05/05/23 0713, New Bag at 05/05/23 0713   Review of patient's allergies indicates:   Allergen Reactions    Latex Rash    Adhesive tape-silicones Blisters    Ciprofloxacin        DIAGNOSTICS      Labs/Scans/Micro:    CBC:   Lab Results   Component Value Date    WBC 5.5 10/31/2022    HGB 12.3 10/31/2022    HCT 38.8 10/31/2022    MCV 92.2 10/31/2022     10/31/2022       Sedimentation rate: No results found for: "SEDRATE" C-reactive protein: No results found for: "CRP" Chemistry: [unfilled]  HBA1C: No components found for: "HBA1C"    Ankle Brachial Index: No results found for this or any previous visit.    Imaging:    Plain film: No results found for this or any previous visit.    MRI: No results found for this or any previous visit.    Bone Scan: No results found for this or any previous visit.      Vascular studies: scheduled 8/7/23 @ 8:30 am    Microbiology: Culture obtained 7/17/23; culture obtained 8/24/23    HOME [facility]ENC[facility]  TIMES PER WEEK/DAYS: once weekly   WOUND CARE ORDERS:   Right lower leg: Keep clean and dry applying medihoney daily.   **Home Health can discharge at this time**       Follow up if symptoms worsen or fail to improve.       "

## 2023-09-05 ENCOUNTER — ANESTHESIA EVENT (OUTPATIENT)
Dept: SURGERY | Facility: HOSPITAL | Age: 63
End: 2023-09-05
Payer: MEDICARE

## 2023-09-06 NOTE — ANESTHESIA PREPROCEDURE EVALUATION
09/05/2023  Kimberly Agarwal is a 63 y.o., female.      Pre-op Assessment    I have reviewed the Patient Summary Reports.     I have reviewed the Nursing Notes. I have reviewed the NPO Status.   I have reviewed the Medications.     Review of Systems  Anesthesia Hx:  Denies Family Hx of Anesthesia complications.   Denies Personal Hx of Anesthesia complications.   Social:  Smoker, No Alcohol Use    Hematology/Oncology:  Hematology Normal   Oncology Normal     EENT/Dental:EENT/Dental Normal   Cardiovascular:   Hypertension, well controlled    Pulmonary:   Sleep Apnea    Renal/:  Renal/ Normal     Hepatic/GI:   GERD, well controlled Liver Disease, Hepatitis    Musculoskeletal:   Arthritis     Neurological:   Neuromuscular Disease,    Endocrine:   Diabetes, well controlled, type 2    Dermatological:  Skin Normal    Psych:   Psychiatric History          Physical Exam  General: Cooperative, Alert and Oriented    Airway:  Mallampati: II   Mouth Opening: Normal  TM Distance: Normal  Tongue: Normal  Neck ROM: Normal ROM    Dental:  Intact        Anesthesia Plan  Type of Anesthesia, risks & benefits discussed:    Anesthesia Type: MAC  Intra-op Monitoring Plan: Standard ASA Monitors  Post Op Pain Control Plan:   (medical reason for not using multimodal pain management)  Induction:  IV  Informed Consent: Informed consent signed with the Patient and all parties understand the risks and agree with anesthesia plan.  All questions answered. Patient consented to blood products? Yes  ASA Score: 3    Ready For Surgery From Anesthesia Perspective.     .

## 2023-09-08 ENCOUNTER — HOSPITAL ENCOUNTER (OUTPATIENT)
Facility: HOSPITAL | Age: 63
Discharge: HOME | End: 2023-09-08
Attending: ANESTHESIOLOGY | Admitting: ANESTHESIOLOGY
Payer: MEDICARE

## 2023-09-08 ENCOUNTER — ANESTHESIA (OUTPATIENT)
Dept: SURGERY | Facility: HOSPITAL | Age: 63
End: 2023-09-08
Payer: MEDICARE

## 2023-09-08 VITALS
DIASTOLIC BLOOD PRESSURE: 83 MMHG | HEART RATE: 81 BPM | BODY MASS INDEX: 53.92 KG/M2 | RESPIRATION RATE: 18 BRPM | WEIGHT: 293 LBS | SYSTOLIC BLOOD PRESSURE: 162 MMHG | OXYGEN SATURATION: 96 % | HEIGHT: 62 IN | TEMPERATURE: 98 F

## 2023-09-08 DIAGNOSIS — M47.816 LUMBAR SPONDYLOSIS: ICD-10-CM

## 2023-09-08 PROCEDURE — 63600175 PHARM REV CODE 636 W HCPCS: Performed by: NURSE ANESTHETIST, CERTIFIED REGISTERED

## 2023-09-08 PROCEDURE — 63600175 PHARM REV CODE 636 W HCPCS: Performed by: ANESTHESIOLOGY

## 2023-09-08 PROCEDURE — 37000009 HC ANESTHESIA EA ADD 15 MINS: Performed by: ANESTHESIOLOGY

## 2023-09-08 PROCEDURE — 37000008 HC ANESTHESIA 1ST 15 MINUTES: Performed by: ANESTHESIOLOGY

## 2023-09-08 PROCEDURE — D9220A PRA ANESTHESIA: ICD-10-PCS | Mod: ,,, | Performed by: NURSE ANESTHETIST, CERTIFIED REGISTERED

## 2023-09-08 PROCEDURE — 25000003 PHARM REV CODE 250: Performed by: ANESTHESIOLOGY

## 2023-09-08 PROCEDURE — 64636 DESTROY L/S FACET JNT ADDL: CPT | Mod: 50 | Performed by: ANESTHESIOLOGY

## 2023-09-08 PROCEDURE — 64635 DESTROY LUMB/SAC FACET JNT: CPT | Mod: 50 | Performed by: ANESTHESIOLOGY

## 2023-09-08 PROCEDURE — D9220A PRA ANESTHESIA: Mod: ,,, | Performed by: NURSE ANESTHETIST, CERTIFIED REGISTERED

## 2023-09-08 RX ORDER — LIDOCAINE HYDROCHLORIDE 20 MG/ML
INJECTION, SOLUTION INFILTRATION; PERINEURAL
Status: DISCONTINUED | OUTPATIENT
Start: 2023-09-08 | End: 2023-09-08 | Stop reason: HOSPADM

## 2023-09-08 RX ORDER — MIDAZOLAM HYDROCHLORIDE 1 MG/ML
INJECTION INTRAMUSCULAR; INTRAVENOUS
Status: DISCONTINUED | OUTPATIENT
Start: 2023-09-08 | End: 2023-09-08

## 2023-09-08 RX ORDER — SODIUM CHLORIDE, SODIUM LACTATE, POTASSIUM CHLORIDE, CALCIUM CHLORIDE 600; 310; 30; 20 MG/100ML; MG/100ML; MG/100ML; MG/100ML
INJECTION, SOLUTION INTRAVENOUS CONTINUOUS
Status: ACTIVE | OUTPATIENT
Start: 2023-09-08

## 2023-09-08 RX ORDER — BUPIVACAINE HYDROCHLORIDE 2.5 MG/ML
INJECTION, SOLUTION EPIDURAL; INFILTRATION; INTRACAUDAL
Status: DISCONTINUED | OUTPATIENT
Start: 2023-09-08 | End: 2023-09-08 | Stop reason: HOSPADM

## 2023-09-08 RX ORDER — FENTANYL CITRATE 50 UG/ML
INJECTION, SOLUTION INTRAMUSCULAR; INTRAVENOUS
Status: DISCONTINUED | OUTPATIENT
Start: 2023-09-08 | End: 2023-09-08

## 2023-09-08 RX ADMIN — FENTANYL CITRATE 50 MCG: 50 INJECTION, SOLUTION INTRAMUSCULAR; INTRAVENOUS at 11:09

## 2023-09-08 RX ADMIN — SODIUM CHLORIDE, POTASSIUM CHLORIDE, SODIUM LACTATE AND CALCIUM CHLORIDE: 600; 310; 30; 20 INJECTION, SOLUTION INTRAVENOUS at 11:09

## 2023-09-08 RX ADMIN — MIDAZOLAM HYDROCHLORIDE 2 MG: 1 INJECTION, SOLUTION INTRAMUSCULAR; INTRAVENOUS at 11:09

## 2023-09-08 RX ADMIN — MIDAZOLAM HYDROCHLORIDE 1 MG: 1 INJECTION, SOLUTION INTRAMUSCULAR; INTRAVENOUS at 11:09

## 2023-09-08 NOTE — ANESTHESIA POSTPROCEDURE EVALUATION
Anesthesia Post Evaluation    Patient: Kimberly Agarwal    Procedure(s) Performed: Procedure(s) (LRB):  RADIOFREQUENCY ABLATION, NERVE, SPINAL, LUMBAR, MEDIAL BRANCH, 1 LEVEL (Bilateral L3-5 RFA) *Latex Allergy* (Bilateral)    Final Anesthesia Type: MAC      Patient location during evaluation: OPS  Patient participation: Yes- Able to Participate  Level of consciousness: awake and alert  Post-procedure vital signs: reviewed and stable  Pain management: adequate  Airway patency: patent  NIKO mitigation strategies: Multimodal analgesia  PONV status at discharge: No PONV  Anesthetic complications: no      Cardiovascular status: hemodynamically stable  Respiratory status: unassisted, spontaneous ventilation and room air  Hydration status: euvolemic  Follow-up not needed.  Comments: Patient to bed per self          Vitals Value Taken Time   /82 09/08/23 0958   Temp 36.7 °C (98.1 °F) 09/08/23 0958   Pulse 75 09/08/23 0958   Resp 16 09/08/23 1129   SpO2 96 % 09/08/23 0958         No case tracking events are documented in the log.      Pain/Raimundo Score: No data recorded

## 2023-09-08 NOTE — H&P
Patient presenting for Procedure(s) (LRB):  RADIOFREQUENCY ABLATION, NERVE, SPINAL, LUMBAR, MEDIAL BRANCH, 1 LEVEL (Bilateral L3-5 RFA) *Latex Allergy* (Bilateral)     Patient on Anti-coagulation No    No health changes since previous encounter    Past Medical History:   Diagnosis Date    Achilles tendinitis, left leg     Bursitis of right shoulder     Chronic back pain     Chronic pain     GERD (gastroesophageal reflux disease)     Hepatitis a without hepatic coma     Herpes labialis     High cholesterol     HLD (hyperlipidemia)     HTN (hypertension)     Hypokalemia     Lower extremity edema     OAB (overactive bladder)     Personal history of colonic polyps 04/27/2022    s/p polypectomy - Dr Matheus Alba    RLS (restless legs syndrome)     Sciatica     Sleep apnea     CPAP    Sleep disorder     Spondylosis of lumbosacral spine with radiculopathy     Type 2 diabetes mellitus with unspecified diabetic retinopathy without macular edema     Urinary incontinence, mixed     Vitamin D deficiency      Past Surgical History:   Procedure Laterality Date    APPENDECTOMY      BLOCK, NERVE, GENICULAR Bilateral 05/05/2023    Dr Phillip Clement    COLONOSCOPY W/ BIOPSIES AND POLYPECTOMY  04/27/2022    Dr Matheus Alba    COLONOSCOPY W/ BIOPSIES AND POLYPECTOMY  12/09/2019    Dr Matheus Alba    EPIDURAL STEROID INJECTION INTO LUMBAR SPINE      Dr Lorelei Schmidt    ESOPHAGOGASTRODUODENOSCOPY  04/27/2022    Dr Matheus Alba    ESOPHAGOGASTRODUODENOSCOPY  12/09/2019    Dr Matheus Alba    LIVER BIOPSY      LUMBAR FUSION  2010    Fused L4-5    OPEN REDUCTION AND INTERNAL FIXATION (ORIF) OF INJURY OF WRIST Right     RADIOFREQUENCY ABLATION OF LUMBAR MEDIAL BRANCH NERVE AT SINGLE LEVEL Bilateral 09/09/2022    Dr Phillip Clement MD    SPINE SURGERY      TONSILLECTOMY      TOTAL KNEE ARTHROPLASTY Left 2020     Review of patient's allergies indicates:   Allergen Reactions    Latex Rash    Adhesive tape-silicones Blisters    Ciprofloxacin          Current Facility-Administered Medications on File Prior to Encounter   Medication Dose Route Frequency Provider Last Rate Last Admin    lactated ringers infusion   Intravenous Continuous Jayjay Lagunas, DO 10 mL/hr at 09/09/22 0945 New Bag at 09/09/22 0945    lactated ringers infusion   Intravenous Continuous Jayjay Lagunas, DO 10 mL/hr at 05/05/23 0713 New Bag at 09/08/23 1109     Current Outpatient Medications on File Prior to Encounter   Medication Sig Dispense Refill    betamethasone valerate 0.1% (VALISONE) 0.1 % Crea Apply topically once daily. 45 g 3    calcium carbonate/vitamin D3 (CALTRATE 600 PLUS D ORAL) Take 1 tablet by mouth 2 (two) times daily.      cetirizine (ZYRTEC) 10 MG tablet Take 10 mg by mouth every evening.      cyclobenzaprine (FLEXERIL) 10 MG tablet TAKE 1 TABLET THREE TIMES DAILY AS NEEDED FOR MUSCLE SPASM(S) 270 tablet 1    DULoxetine (CYMBALTA) 60 MG capsule every morning.      esomeprazole (NEXIUM) 40 MG capsule Take 40 mg by mouth before breakfast.      furosemide (LASIX) 40 MG tablet TAKE 1 TABLET TWICE DAILY 180 tablet 1    HYDROcodone-acetaminophen (NORCO) 7.5-325 mg per tablet Take 1 tablet by mouth 2 (two) times daily as needed for Pain.      losartan (COZAAR) 25 MG tablet Take 1 tablet (25 mg total) by mouth once daily. (Patient taking differently: Take 25 mg by mouth every morning.) 90 tablet 3    losartan-hydrochlorothiazide 50-12.5 mg (HYZAAR) 50-12.5 mg per tablet Take 1 tablet by mouth once daily. (Patient taking differently: Take 1 tablet by mouth every morning.) 90 tablet 3    lutein 40 mg Cap Take 1 capsule by mouth every morning.      metFORMIN (GLUCOPHAGE) 500 MG tablet Take 1 tablet (500 mg total) by mouth 2 (two) times daily with meals. 180 tablet 3    metroNIDAZOLE (METROGEL) 0.75 % gel Apply topically 2 (two) times daily. 135 g 0    MYRBETRIQ 50 mg Tb24 Take 1 tablet by mouth nightly.      polyethylene glycol (GLYCOLAX) 17 gram PwPk Take 17 g by mouth every  "evening.      potassium chloride SA (K-DUR,KLOR-CON) 20 MEQ tablet TAKE 1 TABLET TWICE DAILY 180 tablet 3    pramipexole (MIRAPEX) 0.125 MG tablet TAKE 1 TABLET EVERY EVENING 90 tablet 1    pregabalin (LYRICA) 150 MG capsule Take 150 mg by mouth 2 (two) times a day.      simvastatin (ZOCOR) 20 MG tablet TAKE 1 TABLET EVERY EVENING 90 tablet 1    valACYclovir (VALTREX) 500 MG tablet Take 500 mg by mouth daily as needed.          PMHx, PSHx, Allergies, Medications reviewed in epic    ROS negative except pain complaints in HPI    OBJECTIVE:    BP (!) 162/83   Pulse 81   Temp 98.1 °F (36.7 °C) (Oral)   Resp 18   Ht 5' 2.01" (1.575 m)   Wt (!) 140.6 kg (310 lb)   SpO2 96%   Breastfeeding No   BMI 56.69 kg/m²     PHYSICAL EXAMINATION:    GENERAL: Well appearing, in no acute distress, alert and oriented x3.  PSYCH:  Mood and affect appropriate.  SKIN: Skin color, texture, turgor normal, no rashes or lesions which will impact the procedure.  CV: RRR with palpation of the radial artery.  PULM: No evidence of respiratory difficulty, symmetric chest rise. Clear to auscultation.  NEURO: Cranial nerves grossly intact.    Plan:    Proceed with procedure as planned Procedure(s) (LRB):  RADIOFREQUENCY ABLATION, NERVE, SPINAL, LUMBAR, MEDIAL BRANCH, 1 LEVEL (Bilateral L3-5 RFA) *Latex Allergy* (Bilateral)    Phillip Clement MD  09/08/2023            "

## 2023-09-08 NOTE — DISCHARGE SUMMARY
[facility] - Periop Services  Discharge Note  Short Stay    Procedure(s) (LRB):  RADIOFREQUENCY ABLATION, NERVE, SPINAL, LUMBAR, MEDIAL BRANCH, 1 LEVEL (Bilateral L3-5 RFA) *Latex Allergy* (Bilateral)      OUTCOME: Patient tolerated treatment/procedure well without complication and is now ready for discharge.    DISPOSITION: Home or Self Care    FINAL DIAGNOSIS:  Lumbar spondylosis    FOLLOWUP: In clinic    DISCHARGE INSTRUCTIONS:  No discharge procedures on file.      Clinical Reference Documents Added to Patient Instructions         Document    RADIOFREQUENCY ABLATION FOR PAIN (ENGLISH)            TIME SPENT ON DISCHARGE: 2 minutes

## 2023-09-19 DIAGNOSIS — E11.628 TYPE 2 DIABETES MELLITUS WITH OTHER SKIN COMPLICATION, WITHOUT LONG-TERM CURRENT USE OF INSULIN: ICD-10-CM

## 2023-09-19 RX ORDER — METFORMIN HYDROCHLORIDE 500 MG/1
500 TABLET ORAL 2 TIMES DAILY WITH MEALS
Qty: 180 TABLET | Refills: 3 | Status: SHIPPED | OUTPATIENT
Start: 2023-09-19 | End: 2024-09-18

## 2023-11-07 LAB
LEFT EYE DM RETINOPATHY: NEGATIVE
RIGHT EYE DM RETINOPATHY: NEGATIVE

## 2023-11-16 ENCOUNTER — TELEPHONE (OUTPATIENT)
Dept: FAMILY MEDICINE | Facility: CLINIC | Age: 63
End: 2023-11-16
Payer: MEDICARE

## 2023-11-16 DIAGNOSIS — K21.9 GASTROESOPHAGEAL REFLUX DISEASE, UNSPECIFIED WHETHER ESOPHAGITIS PRESENT: Primary | ICD-10-CM

## 2023-11-16 RX ORDER — ESOMEPRAZOLE MAGNESIUM 40 MG/1
40 CAPSULE, DELAYED RELEASE ORAL
Qty: 90 CAPSULE | Refills: 1 | Status: SHIPPED | OUTPATIENT
Start: 2023-11-16

## 2023-11-16 NOTE — TELEPHONE ENCOUNTER
----- Message from Flora Markham sent at 11/16/2023 10:33 AM CST -----  Patient requesting nexium be sent to [facility] instead of her buying over the counter

## 2023-12-04 DIAGNOSIS — S81.801D WOUND OF RIGHT LOWER EXTREMITY, SUBSEQUENT ENCOUNTER: Primary | ICD-10-CM

## 2023-12-04 RX ORDER — BETAMETHASONE VALERATE 1.2 MG/G
CREAM TOPICAL
Qty: 45 G | Refills: 3 | Status: SHIPPED | OUTPATIENT
Start: 2023-12-04

## 2023-12-21 DIAGNOSIS — I10 HYPERTENSION, UNSPECIFIED TYPE: ICD-10-CM

## 2023-12-21 DIAGNOSIS — E55.9 VITAMIN D DEFICIENCY: ICD-10-CM

## 2023-12-21 DIAGNOSIS — E66.01 CLASS 3 SEVERE OBESITY DUE TO EXCESS CALORIES WITH SERIOUS COMORBIDITY AND BODY MASS INDEX (BMI) OF 50.0 TO 59.9 IN ADULT: ICD-10-CM

## 2023-12-21 DIAGNOSIS — Z00.00 WELLNESS EXAMINATION: Primary | ICD-10-CM

## 2023-12-21 DIAGNOSIS — E11.628 TYPE 2 DIABETES MELLITUS WITH OTHER SKIN COMPLICATION, WITHOUT LONG-TERM CURRENT USE OF INSULIN: ICD-10-CM

## 2023-12-26 ENCOUNTER — DOCUMENTATION ONLY (OUTPATIENT)
Dept: FAMILY MEDICINE | Facility: CLINIC | Age: 63
End: 2023-12-26
Payer: MEDICARE

## 2024-01-08 ENCOUNTER — TELEPHONE (OUTPATIENT)
Dept: FAMILY MEDICINE | Facility: CLINIC | Age: 64
End: 2024-01-08
Payer: MEDICARE

## 2024-01-08 NOTE — TELEPHONE ENCOUNTER
----- Message from Flora Markham sent at 1/8/2024 10:28 AM CST -----  Patient wanting samples of myrbetriq 50mg

## 2024-01-08 NOTE — TELEPHONE ENCOUNTER
Patient given samples of myrbetriq 50mg 4- 7days supply  Ndc [id number]  Lot-[id number]  Exp: 02/2026

## 2024-01-18 ENCOUNTER — LAB VISIT (OUTPATIENT)
Dept: LAB | Facility: HOSPITAL | Age: 64
End: 2024-01-18
Attending: FAMILY MEDICINE
Payer: MEDICARE

## 2024-01-18 DIAGNOSIS — E11.628 TYPE 2 DIABETES MELLITUS WITH OTHER SKIN COMPLICATION, WITHOUT LONG-TERM CURRENT USE OF INSULIN: ICD-10-CM

## 2024-01-18 DIAGNOSIS — E66.01 CLASS 3 SEVERE OBESITY DUE TO EXCESS CALORIES WITH SERIOUS COMORBIDITY AND BODY MASS INDEX (BMI) OF 50.0 TO 59.9 IN ADULT: ICD-10-CM

## 2024-01-18 DIAGNOSIS — I10 HYPERTENSION, UNSPECIFIED TYPE: ICD-10-CM

## 2024-01-18 DIAGNOSIS — E55.9 VITAMIN D DEFICIENCY: ICD-10-CM

## 2024-01-18 DIAGNOSIS — Z00.00 WELLNESS EXAMINATION: ICD-10-CM

## 2024-01-18 LAB
ALBUMIN SERPL-MCNC: 3.6 G/DL (ref 3.4–4.8)
ALBUMIN/GLOB SERPL: 1 RATIO (ref 1.1–2)
ALP SERPL-CCNC: 86 UNIT/L (ref 40–150)
ALT SERPL-CCNC: 17 UNIT/L (ref 0–55)
AST SERPL-CCNC: 15 UNIT/L (ref 5–34)
BASOPHILS # BLD AUTO: 0.03 X10(3)/MCL
BASOPHILS NFR BLD AUTO: 0.5 %
BILIRUB SERPL-MCNC: 0.4 MG/DL
BUN SERPL-MCNC: 18 MG/DL (ref 9.8–20.1)
CALCIUM SERPL-MCNC: 9.9 MG/DL (ref 8.4–10.2)
CHLORIDE SERPL-SCNC: 109 MMOL/L (ref 98–107)
CHOLEST SERPL-MCNC: 164 MG/DL
CHOLEST/HDLC SERPL: 4 {RATIO} (ref 0–5)
CO2 SERPL-SCNC: 29 MMOL/L (ref 23–31)
CREAT SERPL-MCNC: 0.92 MG/DL (ref 0.55–1.02)
CREAT UR-MCNC: 64 MG/DL (ref 45–106)
DEPRECATED CALCIDIOL+CALCIFEROL SERPL-MC: 63.7 NG/ML (ref 30–80)
EOSINOPHIL # BLD AUTO: 0.15 X10(3)/MCL (ref 0–0.9)
EOSINOPHIL NFR BLD AUTO: 2.4 %
ERYTHROCYTE [DISTWIDTH] IN BLOOD BY AUTOMATED COUNT: 14.3 % (ref 11.5–17)
EST. AVERAGE GLUCOSE BLD GHB EST-MCNC: 119.8 MG/DL
GFR SERPLBLD CREATININE-BSD FMLA CKD-EPI: >60 MLS/MIN/1.73/M2
GLOBULIN SER-MCNC: 3.6 GM/DL (ref 2.4–3.5)
GLUCOSE SERPL-MCNC: 100 MG/DL (ref 82–115)
HBA1C MFR BLD: 5.8 %
HCT VFR BLD AUTO: 41.2 % (ref 37–47)
HDLC SERPL-MCNC: 41 MG/DL (ref 35–60)
HGB BLD-MCNC: 12.8 G/DL (ref 12–16)
IMM GRANULOCYTES # BLD AUTO: 0.04 X10(3)/MCL (ref 0–0.04)
IMM GRANULOCYTES NFR BLD AUTO: 0.6 %
LDLC SERPL CALC-MCNC: 90 MG/DL (ref 50–140)
LYMPHOCYTES # BLD AUTO: 1.36 X10(3)/MCL (ref 0.6–4.6)
LYMPHOCYTES NFR BLD AUTO: 21.7 %
MCH RBC QN AUTO: 28.8 PG (ref 27–31)
MCHC RBC AUTO-ENTMCNC: 31.1 G/DL (ref 33–36)
MCV RBC AUTO: 92.6 FL (ref 80–94)
MICROALBUMIN UR-MCNC: 7.6 UG/ML
MICROALBUMIN/CREAT RATIO PNL UR: 11.9 MG/GM CR (ref 0–30)
MONOCYTES # BLD AUTO: 0.49 X10(3)/MCL (ref 0.1–1.3)
MONOCYTES NFR BLD AUTO: 7.8 %
NEUTROPHILS # BLD AUTO: 4.19 X10(3)/MCL (ref 2.1–9.2)
NEUTROPHILS NFR BLD AUTO: 67 %
NRBC BLD AUTO-RTO: 0 %
PLATELET # BLD AUTO: 206 X10(3)/MCL (ref 130–400)
PMV BLD AUTO: 11.2 FL (ref 7.4–10.4)
POTASSIUM SERPL-SCNC: 4.2 MMOL/L (ref 3.5–5.1)
PROT SERPL-MCNC: 7.2 GM/DL (ref 5.8–7.6)
RBC # BLD AUTO: 4.45 X10(6)/MCL (ref 4.2–5.4)
SODIUM SERPL-SCNC: 145 MMOL/L (ref 136–145)
TRIGL SERPL-MCNC: 167 MG/DL (ref 37–140)
TSH SERPL-ACNC: 0.63 UIU/ML (ref 0.35–4.94)
VLDLC SERPL CALC-MCNC: 33 MG/DL
WBC # SPEC AUTO: 6.26 X10(3)/MCL (ref 4.5–11.5)

## 2024-01-18 PROCEDURE — 82306 VITAMIN D 25 HYDROXY: CPT

## 2024-01-18 PROCEDURE — 80061 LIPID PANEL: CPT

## 2024-01-18 PROCEDURE — 84443 ASSAY THYROID STIM HORMONE: CPT

## 2024-01-18 PROCEDURE — 36415 COLL VENOUS BLD VENIPUNCTURE: CPT

## 2024-01-18 PROCEDURE — 80053 COMPREHEN METABOLIC PANEL: CPT

## 2024-01-18 PROCEDURE — 83036 HEMOGLOBIN GLYCOSYLATED A1C: CPT

## 2024-01-18 PROCEDURE — 85025 COMPLETE CBC W/AUTO DIFF WBC: CPT

## 2024-01-18 PROCEDURE — 82043 UR ALBUMIN QUANTITATIVE: CPT

## 2024-01-22 ENCOUNTER — OFFICE VISIT (OUTPATIENT)
Dept: FAMILY MEDICINE | Facility: CLINIC | Age: 64
End: 2024-01-22
Payer: MEDICARE

## 2024-01-22 VITALS
BODY MASS INDEX: 53.92 KG/M2 | DIASTOLIC BLOOD PRESSURE: 82 MMHG | SYSTOLIC BLOOD PRESSURE: 134 MMHG | OXYGEN SATURATION: 95 % | HEART RATE: 130 BPM | WEIGHT: 293 LBS | HEIGHT: 62 IN | RESPIRATION RATE: 18 BRPM | TEMPERATURE: 98 F

## 2024-01-22 DIAGNOSIS — I10 ESSENTIAL HYPERTENSION: ICD-10-CM

## 2024-01-22 DIAGNOSIS — E11.628 TYPE 2 DIABETES MELLITUS WITH OTHER SKIN COMPLICATION, WITHOUT LONG-TERM CURRENT USE OF INSULIN: ICD-10-CM

## 2024-01-22 DIAGNOSIS — Z00.00 ROUTINE GENERAL MEDICAL EXAMINATION AT A HEALTH CARE FACILITY: Primary | ICD-10-CM

## 2024-01-22 DIAGNOSIS — R39.15 URINARY URGENCY: ICD-10-CM

## 2024-01-22 DIAGNOSIS — E66.01 CLASS 3 SEVERE OBESITY DUE TO EXCESS CALORIES WITH SERIOUS COMORBIDITY AND BODY MASS INDEX (BMI) OF 50.0 TO 59.9 IN ADULT: ICD-10-CM

## 2024-01-22 PROBLEM — R22.41 LOCALIZED SWELLING, MASS AND LUMP, RIGHT LOWER LIMB: Status: RESOLVED | Noted: 2023-08-14 | Resolved: 2024-01-22

## 2024-01-22 PROBLEM — S81.801A WOUND OF RIGHT LEG: Status: RESOLVED | Noted: 2022-10-27 | Resolved: 2024-01-22

## 2024-01-22 PROBLEM — R12 HEARTBURN: Status: RESOLVED | Noted: 2022-11-22 | Resolved: 2024-01-22

## 2024-01-22 PROBLEM — L23.7 CONTACT DERMATITIS DUE TO POISON IVY: Status: RESOLVED | Noted: 2022-11-04 | Resolved: 2024-01-22

## 2024-01-22 PROBLEM — Z48.02 VISIT FOR SUTURE REMOVAL: Status: RESOLVED | Noted: 2022-11-04 | Resolved: 2024-01-22

## 2024-01-22 PROBLEM — E78.2 MIXED HYPERLIPIDEMIA: Chronic | Status: ACTIVE | Noted: 2022-10-27

## 2024-01-22 PROBLEM — B96.89 SKIN INFECTION, BACTERIAL: Status: RESOLVED | Noted: 2022-11-04 | Resolved: 2024-01-22

## 2024-01-22 PROBLEM — E87.6 HYPOKALEMIA: Status: RESOLVED | Noted: 2018-10-26 | Resolved: 2024-01-22

## 2024-01-22 PROBLEM — L08.9 SKIN INFECTION, BACTERIAL: Status: RESOLVED | Noted: 2022-11-04 | Resolved: 2024-01-22

## 2024-01-22 PROCEDURE — 1125F AMNT PAIN NOTED PAIN PRSNT: CPT | Mod: CPTII,,, | Performed by: FAMILY MEDICINE

## 2024-01-22 PROCEDURE — 3288F FALL RISK ASSESSMENT DOCD: CPT | Mod: CPTII,,, | Performed by: FAMILY MEDICINE

## 2024-01-22 PROCEDURE — 3066F NEPHROPATHY DOC TX: CPT | Mod: CPTII,,, | Performed by: FAMILY MEDICINE

## 2024-01-22 PROCEDURE — 3044F HG A1C LEVEL LT 7.0%: CPT | Mod: CPTII,,, | Performed by: FAMILY MEDICINE

## 2024-01-22 PROCEDURE — 1160F RVW MEDS BY RX/DR IN RCRD: CPT | Mod: CPTII,,, | Performed by: FAMILY MEDICINE

## 2024-01-22 PROCEDURE — 3061F NEG MICROALBUMINURIA REV: CPT | Mod: CPTII,,, | Performed by: FAMILY MEDICINE

## 2024-01-22 PROCEDURE — 1159F MED LIST DOCD IN RCRD: CPT | Mod: CPTII,,, | Performed by: FAMILY MEDICINE

## 2024-01-22 PROCEDURE — 3075F SYST BP GE 130 - 139MM HG: CPT | Mod: CPTII,,, | Performed by: FAMILY MEDICINE

## 2024-01-22 PROCEDURE — 3079F DIAST BP 80-89 MM HG: CPT | Mod: CPTII,,, | Performed by: FAMILY MEDICINE

## 2024-01-22 PROCEDURE — 1101F PT FALLS ASSESS-DOCD LE1/YR: CPT | Mod: CPTII,,, | Performed by: FAMILY MEDICINE

## 2024-01-22 PROCEDURE — G0402 INITIAL PREVENTIVE EXAM: HCPCS | Mod: ,,, | Performed by: FAMILY MEDICINE

## 2024-01-22 RX ORDER — CALCIUM CITRATE/VITAMIN D3 200MG-6.25
TABLET ORAL
COMMUNITY
Start: 2024-01-18

## 2024-01-22 RX ORDER — GLUCOSAM/CHON-MSM1/C/MANG/BOSW 500-416.6
TABLET ORAL
COMMUNITY
Start: 2024-01-18

## 2024-01-22 NOTE — PROGRESS NOTES
Patient ID: [id number]     Chief Complaint: Medicare AWV        HPI:     Kimberly Agarwal is a 63 y.o. female here today for a Medicare Wellness. No other complaints today.       ----------------------------  Achilles tendinitis, left leg  Astigmatism  Bursitis of right shoulder  Chronic back pain  Chronic pain  GERD (gastroesophageal reflux disease)  Hepatitis a without hepatic coma  Herpes labialis  High cholesterol  HLD (hyperlipidemia)  HTN (hypertension)  Hyperopia  Hypokalemia  Hypokalemia  Lower extremity edema  OAB (overactive bladder)  Personal history of colonic polyps      Comment:  s/p polypectomy - Dr Matheus Alba  Presbyopia  RLS (restless legs syndrome)  Sciatica  Sleep apnea      Comment:  CPAP  Sleep disorder  Spondylosis of lumbosacral spine with radiculopathy  Type 2 diabetes mellitus with unspecified diabetic retinopathy   without macular edema  Urinary incontinence, mixed  Vitamin D deficiency  Wound of right leg     Past Surgical History:   Procedure Laterality Date    APPENDECTOMY      BLOCK, NERVE, GENICULAR Bilateral 05/05/2023    Dr Phillip Clement    COLONOSCOPY W/ BIOPSIES AND POLYPECTOMY  04/27/2022    Dr Matheus Alba    COLONOSCOPY W/ BIOPSIES AND POLYPECTOMY  12/09/2019    Dr Matheus Alba    EPIDURAL STEROID INJECTION INTO LUMBAR SPINE      Dr Lorelei Schmidt    ESOPHAGOGASTRODUODENOSCOPY  04/27/2022    Dr Matheus Alba    ESOPHAGOGASTRODUODENOSCOPY  12/09/2019    Dr Matheus Alba    LIVER BIOPSY      LUMBAR FUSION  2010    Fused L4-5    OPEN REDUCTION AND INTERNAL FIXATION (ORIF) OF INJURY OF WRIST Right     RADIOFREQUENCY ABLATION OF LUMBAR MEDIAL BRANCH NERVE AT SINGLE LEVEL Bilateral 09/09/2022    Dr Phillip Clement MD    RADIOFREQUENCY ABLATION OF LUMBAR MEDIAL BRANCH NERVE AT SINGLE LEVEL Bilateral 9/8/2023    Procedure: RADIOFREQUENCY ABLATION, NERVE, SPINAL, LUMBAR, MEDIAL BRANCH, 1 LEVEL (Bilateral L3-5 RFA) *Latex Allergy*;  Surgeon: Phillip Clement MD;  Location: [facility] OR;  Service: Pain  Management;  Laterality: Bilateral;    SPINE SURGERY      TONSILLECTOMY      TOTAL KNEE ARTHROPLASTY Left 2020       Review of patient's allergies indicates:   Allergen Reactions    Latex Rash    Adhesive tape-silicones Blisters    Ciprofloxacin        Outpatient Medications Marked as Taking for the 1/22/24 encounter (Office Visit) with Kai Back,    Medication Sig Dispense Refill    betamethasone valerate 0.1% (VALISONE) 0.1 % Crea APPLY 1 APPLICATION TOPICALLY 2 (TWO) TIMES A DAY. 45 g 3    calcium carbonate/vitamin D3 (CALTRATE 600 PLUS D ORAL) Take 1 tablet by mouth 2 (two) times daily.      cetirizine (ZYRTEC) 10 MG tablet Take 10 mg by mouth every evening.      cyclobenzaprine (FLEXERIL) 10 MG tablet TAKE 1 TABLET THREE TIMES DAILY AS NEEDED FOR MUSCLE SPASM(S) 270 tablet 1    DULoxetine (CYMBALTA) 60 MG capsule every morning.      esomeprazole (NEXIUM) 40 MG capsule Take 1 capsule (40 mg total) by mouth before breakfast. 90 capsule 1    furosemide (LASIX) 40 MG tablet TAKE 1 TABLET TWICE DAILY 180 tablet 1    HYDROcodone-acetaminophen (NORCO) 7.5-325 mg per tablet Take 1 tablet by mouth 2 (two) times daily as needed for Pain.      losartan (COZAAR) 25 MG tablet Take 1 tablet (25 mg total) by mouth once daily. (Patient taking differently: Take 25 mg by mouth every morning.) 90 tablet 3    losartan-hydrochlorothiazide 50-12.5 mg (HYZAAR) 50-12.5 mg per tablet Take 1 tablet by mouth once daily. (Patient taking differently: Take 1 tablet by mouth every morning.) 90 tablet 3    lutein 40 mg Cap Take 1 capsule by mouth every morning.      metFORMIN (GLUCOPHAGE) 500 MG tablet Take 1 tablet (500 mg total) by mouth 2 (two) times daily with meals. 180 tablet 3    metroNIDAZOLE (METROGEL) 0.75 % gel Apply topically 2 (two) times daily. 135 g 0    MYRBETRIQ 50 mg Tb24 Take 1 tablet by mouth nightly.      polyethylene glycol (GLYCOLAX) 17 gram PwPk Take 17 g by mouth every evening.      potassium chloride SA  (K-DUR,KLOR-CON) 20 MEQ tablet TAKE 1 TABLET TWICE DAILY 180 tablet 3    pramipexole (MIRAPEX) 0.125 MG tablet TAKE 1 TABLET EVERY EVENING 90 tablet 1    pregabalin (LYRICA) 150 MG capsule Take 150 mg by mouth 2 (two) times a day.      simvastatin (ZOCOR) 20 MG tablet TAKE 1 TABLET EVERY EVENING 90 tablet 1    TRUE METRIX GLUCOSE TEST STRIP Strp       TRUEPLUS LANCETS 30 gauge Misc       valACYclovir (VALTREX) 500 MG tablet Take 500 mg by mouth daily as needed.         Social History     Socioeconomic History    Marital status:    Tobacco Use    Smoking status: Former     Current packs/day: 0.25     Average packs/day: 0.3 packs/day for 5.0 years (1.3 ttl pk-yrs)     Types: Cigarettes    Smokeless tobacco: Never   Substance and Sexual Activity    Alcohol use: Not Currently    Drug use: Never    Sexual activity: Not Currently     Partners: Male     Social Determinants of Health     Financial Resource Strain: Low Risk  (5/19/2023)    Overall Financial Resource Strain (CARDIA)     Difficulty of Paying Living Expenses: Not hard at all   Food Insecurity: No Food Insecurity (5/19/2023)    Hunger Vital Sign     Worried About Running Out of Food in the Last Year: Never true     Ran Out of Food in the Last Year: Never true   Transportation Needs: No Transportation Needs (5/19/2023)    PRAPARE - Transportation     Lack of Transportation (Medical): No     Lack of Transportation (Non-Medical): No   Physical Activity: Inactive (5/19/2023)    Exercise Vital Sign     Days of Exercise per Week: 0 days     Minutes of Exercise per Session: 0 min   Stress: Stress Concern Present (5/19/2023)    [employer] - Occupational Stress Questionnaire     Feeling of Stress : To some extent   Social Connections: Socially Integrated (5/19/2023)    Social Connection and Isolation Panel [NHANES]     Frequency of Communication with Friends and Family: More than three times a week     Frequency of Social Gatherings  with Friends and Family: Three times a week     Attends [employer] Services: More than 4 times per year     Active Member of Clubs or Organizations: Yes     Attends Club or Organization Meetings: More than 4 times per year     Marital Status:    Housing Stability: Low Risk  (5/19/2023)    Housing Stability Vital Sign     Unable to Pay for Housing in the Last Year: No     Number of Places Lived in the Last Year: 1     Unstable Housing in the Last Year: No        Family History   Problem Relation Age of Onset    Hypertension Mother     Hyperlipidemia Mother     Stroke Mother     Hypertension Father     Diabetes Father     Hyperlipidemia Father     Heart disease Father     Diabetes Sister     Pancreatic cancer Sister 68        Cause of death    Liver cancer Sister 68        Cause of death    Asthma Brother     Cancer Brother     COPD Brother     Heart disease Brother     Diabetes Mellitus Brother     Diabetes Brother     Heart disease Brother     Diabetes Brother     Heart disease Brother     Diabetes Brother     Diabetes Brother     Heart disease Brother         Patient Care Team:  Kai Back DO as PCP - General (Family Medicine)  Phillip Clement MD as Consulting Physician (Pain Medicine)  Matheus Alba MD as Consulting Physician (Gastroenterology)  Lorelei Schmidt MD as Anesthesiologist (Interventional Pain Medicine)  Yeison Leal MD (Orthopedic Surgery)  [place] ([facility] Health Services)  Chapo Steele MD (Obstetrics and Gynecology)  Kirit De OD (Optometry)       Subjective:     Review of Systems   Constitutional:  Negative for chills and fever.   Respiratory:  Negative for shortness of breath.    Cardiovascular:  Negative for chest pain.   Gastrointestinal:  Negative for constipation and diarrhea.   Musculoskeletal:  Negative for falls.   Neurological:  Negative for dizziness and headaches.   Psychiatric/Behavioral:  The patient does not have insomnia.       Patient Reported Health Risk Assessments:  What [age] 65-69  Are you male or female?: Female  During the past four weeks, how much have you been bothered by emotional problems such as feeling anxious, depressed, irritable, sad, or downhearted and blue?: Slightly  During the past five weeks, has your physical and/or emotional health limited your social activities with family, friends, neighbors, or groups?: Not at all  During the past four weeks, how much bodily pain have you generally had?: Moderate pain  During the past four weeks, was someone available to help if you needed and wanted help?: Yes, as much as I wanted  During the past four weeks, what was the hardest physical activity you could do for at least two minutes?: Heavy  Can you get to places out of walking distance without help?  (For example, can you travel alone on buses or taxis, or drive your own car?): Yes  Can you go shopping for groceries or clothes without someone's help?: Yes  Can you prepare your own meals?: Yes  Can you do your own housework without help?: Yes  Because of any health problems, do you need the help of another person with your personal care needs such as eating, bathing, dressing, or getting around the house?: No  Can you handle your own money without help?: Yes  During the past four weeks, how would you rate your health in general?: Good  How have things been going for you during the past four weeks?: Pretty well  Are you having difficulties driving your car?: No  Do you always fasten your seat belt when you are in a car?: Yes, usually  How often in the past four weeks have you been bothered by falling or dizzy when standing up?: Never  How often in the past four weeks have you been bothered by sexual problems?: Never  How often in the past four weeks have you been bothered by trouble eating well?: Never  How often in the past four weeks have you been bothered by teeth or denture problems?: Never  How often in the  "past four weeks have you been bothered with problems using the telephone?: Never  How often in the past four weeks have you been bothered by tiredness or fatigue?: Sometimes  Have you fallen two or more times in the past year?: No  Are you afraid of falling?: Yes  Are you a smoker?: No  During the past four weeks, how many drinks of wine, beer, or other alcoholic beverages did you have?: No alcohol at all  Do you exercise for about 20 minutes three or more days a week?: Yes, most of the time  Have you been given any information to help you with hazards in your house that might hurt you?: Yes  Have you been given any information to help you with keeping track of your medications?: Yes  How often do you have trouble taking medicines the way you've been told to take them?: I always take them as prescribed  How confident are you that you can control and manage most of your health problems?: Very confident  What is your race? (Check all that apply.):     Objective:     /82   Pulse (!) 130   Temp 97.9 °F (36.6 °C) (Temporal)   Resp 18   Ht 5' 1.81" (1.57 m)   Wt (!) 144.7 kg (319 lb)   SpO2 95%   BMI 58.70 kg/m²   Repeat Heart rate reading on pulse oximeter: 108.   Physical Exam  Vitals reviewed.   Constitutional:       General: She is not in acute distress.     Appearance: Normal appearance. She is obese.   Cardiovascular:      Rate and Rhythm: Normal rate and regular rhythm.      Pulses:           Dorsalis pedis pulses are 2+ on the right side and 2+ on the left side.        Posterior tibial pulses are 2+ on the right side and 2+ on the left side.      Heart sounds: No murmur heard.     No friction rub. No gallop.   Pulmonary:      Effort: No respiratory distress.      Breath sounds: No wheezing, rhonchi or rales.   Musculoskeletal:         General: No swelling, tenderness or deformity.      Right lower leg: No edema.      Left lower leg: No edema.      Right foot: Normal range of motion. No " deformity, bunion, Charcot foot, foot drop or prominent metatarsal heads.      Left foot: No deformity, bunion, Charcot foot, foot drop or prominent metatarsal heads.   Feet:      Right foot:      Protective Sensation: 10 sites tested.  10 sites sensed.      Skin integrity: Skin integrity normal.      Toenail Condition: Right toenails are normal.      Left foot:      Protective Sensation: 10 sites tested.  10 sites sensed.      Skin integrity: Skin integrity normal.      Toenail Condition: Left toenails are normal.   Skin:     General: Skin is warm and dry.      Findings: No lesion or rash.   Neurological:      General: No focal deficit present.      Mental Status: She is alert.   Psychiatric:         Mood and Affect: Mood normal.       Assessment:       ICD-10-CM ICD-9-CM   1. Routine general medical examination at a health care facility  Z00.00 V70.0   2. Class 3 severe obesity due to excess calories with serious comorbidity and body mass index (BMI) of 50.0 to 59.9 in adult  E66.01 278.01    Z68.43 V85.43   3. Urinary urgency  R39.15 788.63   4. Essential hypertension  I10 401.9   5. Type 2 diabetes mellitus with other skin complication, without long-term current use of insulin  E11.628 250.80        Plan:       Medicare Annual Wellness and Personalized Prevention Plan:     Fall Risk + Home Safety + Living Situation + Whisper Test + Depression Screen + CAGE + Cognitive Impairment Screen + ADL Screen + Timed Get Up and Go + Nutrition Screen + PAQ Screen + Health Risk Assessment all reviewed.          No data to display                  1/22/2024     1:45 PM 5/19/2023     9:30 AM 2/17/2023     9:00 AM 12/5/2022    11:00 AM 11/22/2022     8:45 AM 11/3/2022     2:20 PM 10/27/2022    10:40 AM   Fall Risk Assessment - Outpatient   Mobility Status Ambulatory Ambulatory Ambulatory Ambulatory Ambulatory Ambulatory Ambulatory   Number of falls 0 1 0 0 0 0 2+   Identified as fall risk False False False False False False  True                   Depression Screening  Over the past two weeks, has the patient felt down, depressed, or hopeless?: No  Over the past two weeks, has the patient felt little interest or pleasure in doing things?: No  Functional Ability/Safety Screening  Was the patient's timed Up & Go test unsteady or longer than 30 seconds?: No  Does the patient need help with phone, transportation, shopping, preparing meals, housework, laundry, meds, or managing money?: No  Does the patient's home have rugs in the hallway, lack grab bars in the bathroom, lack handrails on the stairs or have poor lighting?: No  Have you noticed any hearing difficulties?: No  Cognitive Function (Assessed through direct observation with due consideration of information obtained by way of patient reports and/or concerns raised by family, friends, caretakers, or others)    Does the patient repeat questions/statements in the same day?: No  Does the patient have trouble remembering the date, year, and time?: No  Does the patient have difficulty managing finances?: No  Does the patient have a decreased sense of direction?: No         Alcohol/Tobacco Use - Stressed importance of smoking cessation and limiting alcohol intake.  CVD Risk Factors - Reviewed  Obesity/Physical Activity - Encouraged daily 30 minute physical activity x 5 days per week.    Opioid Screening: Patient medication list reviewed, patient is not taking prescription opioids. Patient is not using additional opioids than prescribed. Patient is at low risk of substance abuse based on this opioid use history.        Health Maintenance Due   Topic Date Due    RSV Vaccine (Age 60+ and Pregnant patients) (1 - 1-dose 60+ series) Never done    COVID-19 Vaccine (6 - 2023-24 season) 09/01/2023     Vaccinations -   Immunization History   Administered Date(s) Administered    COVID-19, MRNA, LN-S, PF (Pfizer) (Gray Cap) 03/18/2021, 04/09/2021    COVID-19, MRNA, LN-S, PF (Pfizer) (Purple Cap)  03/18/2021, 04/09/2021, 11/18/2021    Influenza - Quadrivalent 10/09/2018, 09/20/2019    Influenza - Quadrivalent - High Dose - PF (65 years and older) 10/31/2023    Influenza - Quadrivalent - PF *Preferred* (6 months and older) 10/12/2005, 11/12/2020, 10/11/2021, 10/08/2022    Influenza A (H1N1) 2009 Monovalent - IM 10/19/2009    Pneumococcal Conjugate - 20 Valent 12/12/2023    Td (ADULT) 10/12/2005    Td - PF (ADULT) 10/12/2005    Tdap 10/22/2022    Zoster Recombinant 12/27/2023        Advance Care Planning     Date: 01/22/2024  ACP discussed with patient and form provided for patient to complete and return to office.       1. Routine general medical examination at a health care facility    2. Class 3 severe obesity due to excess calories with serious comorbidity and body mass index (BMI) of 50.0 to 59.9 in adult  Diet and exercise as tolerated   3. Urinary urgency  Myrbetriq 50mg ER tablet samples  4 packs: LOT #[id number] Exp Date 02/26  4. Essential hypertension  On losartan, HZTZ  5. Type 2 diabetes mellitus with other skin complication without long term use of current insulin  -Continue metformin  -CMP and A1C in 6 month  Medication List with Changes/Refills   Current Medications    BETAMETHASONE VALERATE 0.1% (VALISONE) 0.1 % CREA    APPLY 1 APPLICATION TOPICALLY 2 (TWO) TIMES A DAY.       Start Date: 12/4/2023 End Date: --    CALCIUM CARBONATE/VITAMIN D3 (CALTRATE 600 PLUS D ORAL)    Take 1 tablet by mouth 2 (two) times daily.       Start Date: --        End Date: --    CETIRIZINE (ZYRTEC) 10 MG TABLET    Take 10 mg by mouth every evening.       Start Date: 5/12/2021 End Date: --    CYCLOBENZAPRINE (FLEXERIL) 10 MG TABLET    TAKE 1 TABLET THREE TIMES DAILY AS NEEDED FOR MUSCLE SPASM(S)       Start Date: 8/1/2023  End Date: --    DULOXETINE (CYMBALTA) 60 MG CAPSULE    every morning.       Start Date: 7/5/2023  End Date: --    ESOMEPRAZOLE (NEXIUM) 40 MG CAPSULE    Take 1 capsule (40 mg total) by mouth before  breakfast.       Start Date: 11/16/2023End Date: --    FUROSEMIDE (LASIX) 40 MG TABLET    TAKE 1 TABLET TWICE DAILY       Start Date: 8/1/2023  End Date: --    HYDROCODONE-ACETAMINOPHEN (NORCO) 7.5-325 MG PER TABLET    Take 1 tablet by mouth 2 (two) times daily as needed for Pain.       Start Date: --        End Date: --    LOSARTAN (COZAAR) 25 MG TABLET    Take 1 tablet (25 mg total) by mouth once daily.       Start Date: 8/7/2023  End Date: 8/6/2024    LOSARTAN-HYDROCHLOROTHIAZIDE 50-12.5 MG (HYZAAR) 50-12.5 MG PER TABLET    Take 1 tablet by mouth once daily.       Start Date: 8/21/2023 End Date: 8/20/2024    LUTEIN 40 MG CAP    Take 1 capsule by mouth every morning.       Start Date: --        End Date: --    METFORMIN (GLUCOPHAGE) 500 MG TABLET    Take 1 tablet (500 mg total) by mouth 2 (two) times daily with meals.       Start Date: 9/19/2023 End Date: 9/18/2024    METRONIDAZOLE (METROGEL) 0.75 % GEL    Apply topically 2 (two) times daily.       Start Date: 5/2/2023  End Date: --    MYRBETRIQ 50 MG TB24    Take 1 tablet by mouth nightly.       Start Date: 6/20/2023 End Date: --    POLYETHYLENE GLYCOL (GLYCOLAX) 17 GRAM PWPK    Take 17 g by mouth every evening.       Start Date: --        End Date: --    POTASSIUM CHLORIDE SA (K-DUR,KLOR-CON) 20 MEQ TABLET    TAKE 1 TABLET TWICE DAILY       Start Date: 6/15/2023 End Date: --    PRAMIPEXOLE (MIRAPEX) 0.125 MG TABLET    TAKE 1 TABLET EVERY EVENING       Start Date: 8/1/2023  End Date: --    PREGABALIN (LYRICA) 150 MG CAPSULE    Take 150 mg by mouth 2 (two) times a day.       Start Date: 5/12/2021 End Date: --    SIMVASTATIN (ZOCOR) 20 MG TABLET    TAKE 1 TABLET EVERY EVENING       Start Date: 8/1/2023  End Date: --    TRUE METRIX GLUCOSE TEST STRIP STRP           Start Date: 1/18/2024 End Date: --    TRUEPLUS LANCETS 30 GAUGE MISC           Start Date: 1/18/2024 End Date: --    VALACYCLOVIR (VALTREX) 500 MG TABLET    Take 500 mg by mouth daily as needed.        Start Date: 12/28/2022End Date: --      Follow up in about 6 months (around 7/22/2024) for Follow up diabetes. In addition to their scheduled follow up, the patient has also been instructed to follow up on as needed basis.     Provided patient with a 5-10 year written screening schedule and personal prevention plan. Recommendations were developed using the USPSTF age appropriate recommendations. Education, counseling, and referrals were provided as needed. After Visit Summary printed and given to patient which includes a list of additional screenings\tests needed.

## 2024-03-05 DIAGNOSIS — I10 ESSENTIAL HYPERTENSION: ICD-10-CM

## 2024-03-05 RX ORDER — LOSARTAN POTASSIUM 25 MG/1
25 TABLET ORAL EVERY MORNING
Qty: 90 TABLET | Refills: 1 | Status: SHIPPED | OUTPATIENT
Start: 2024-03-05 | End: 2024-09-01

## 2024-03-05 NOTE — TELEPHONE ENCOUNTER
----- Message from Shauna Guerra sent at 3/5/2024 12:01 PM CST -----  Regarding: med/samples  Myrbetriq samples     4 boxes  Lot # [id number]  Exp: 2/26

## 2024-03-26 DIAGNOSIS — G25.81 RESTLESS LEGS: ICD-10-CM

## 2024-03-26 DIAGNOSIS — E78.00 PURE HYPERCHOLESTEROLEMIA: ICD-10-CM

## 2024-03-26 DIAGNOSIS — G89.4 CHRONIC PAIN SYNDROME: ICD-10-CM

## 2024-03-26 RX ORDER — SIMVASTATIN 20 MG/1
20 TABLET, FILM COATED ORAL NIGHTLY
Qty: 90 TABLET | Refills: 3 | Status: SHIPPED | OUTPATIENT
Start: 2024-03-26

## 2024-03-26 RX ORDER — PRAMIPEXOLE DIHYDROCHLORIDE 0.12 MG/1
0.12 TABLET ORAL NIGHTLY
Qty: 90 TABLET | Refills: 3 | Status: SHIPPED | OUTPATIENT
Start: 2024-03-26

## 2024-03-26 RX ORDER — CYCLOBENZAPRINE HCL 10 MG
10 TABLET ORAL
Qty: 270 TABLET | Refills: 3 | Status: SHIPPED | OUTPATIENT
Start: 2024-03-26

## 2024-04-17 ENCOUNTER — HOSPITAL ENCOUNTER (OUTPATIENT)
Dept: RADIOLOGY | Facility: HOSPITAL | Age: 64
Discharge: HOME | End: 2024-04-17
Attending: ANESTHESIOLOGY
Payer: MEDICARE

## 2024-04-17 ENCOUNTER — TELEPHONE (OUTPATIENT)
Dept: FAMILY MEDICINE | Facility: CLINIC | Age: 64
End: 2024-04-17
Payer: MEDICARE

## 2024-04-17 DIAGNOSIS — M54.2 NECK PAIN: ICD-10-CM

## 2024-04-17 PROCEDURE — 72050 X-RAY EXAM NECK SPINE 4/5VWS: CPT | Mod: TC

## 2024-04-17 PROCEDURE — 73030 X-RAY EXAM OF SHOULDER: CPT | Mod: TC,RT

## 2024-04-17 NOTE — TELEPHONE ENCOUNTER
Attempted to inform patient we have samples for her. No answer left voicemail.    4-lot: [id number]  Exp: 07/2026    2- lot:[id number]  Exp:02/2026

## 2024-04-17 NOTE — TELEPHONE ENCOUNTER
----- Message from Marci Carvajal sent at 4/17/2024 10:40 AM CDT -----  Regarding: SAMPLES  Was wondering if we had any samples of MYRBETRIQ 50 mg Tb24.  Please call her if we have any [phone],

## 2024-04-18 DIAGNOSIS — I10 ESSENTIAL HYPERTENSION: ICD-10-CM

## 2024-04-18 RX ORDER — FUROSEMIDE 40 MG/1
40 TABLET ORAL 2 TIMES DAILY
Qty: 180 TABLET | Refills: 3 | Status: SHIPPED | OUTPATIENT
Start: 2024-04-18

## 2024-04-26 ENCOUNTER — HOSPITAL ENCOUNTER (OUTPATIENT)
Dept: RADIOLOGY | Facility: HOSPITAL | Age: 64
Discharge: HOME | End: 2024-04-26
Payer: MEDICARE

## 2024-04-26 DIAGNOSIS — M54.12 CERVICAL RADICULOPATHY: ICD-10-CM

## 2024-05-06 ENCOUNTER — HOSPITAL ENCOUNTER (OUTPATIENT)
Dept: RADIOLOGY | Facility: HOSPITAL | Age: 64
Discharge: HOME | End: 2024-05-06
Payer: MEDICARE

## 2024-05-06 PROCEDURE — 72141 MRI NECK SPINE W/O DYE: CPT | Mod: TC

## 2024-05-25 DIAGNOSIS — K21.9 GASTROESOPHAGEAL REFLUX DISEASE, UNSPECIFIED WHETHER ESOPHAGITIS PRESENT: ICD-10-CM

## 2024-05-28 RX ORDER — ESOMEPRAZOLE MAGNESIUM 40 MG/1
40 CAPSULE, DELAYED RELEASE ORAL
Qty: 90 CAPSULE | Refills: 3 | Status: SHIPPED | OUTPATIENT
Start: 2024-05-28

## 2024-06-08 DIAGNOSIS — E87.6 HYPOKALEMIA: ICD-10-CM

## 2024-06-08 DIAGNOSIS — I10 ESSENTIAL HYPERTENSION: ICD-10-CM

## 2024-06-12 RX ORDER — LOSARTAN POTASSIUM AND HYDROCHLOROTHIAZIDE 12.5; 5 MG/1; MG/1
1 TABLET ORAL EVERY MORNING
Qty: 90 TABLET | Refills: 3 | Status: SHIPPED | OUTPATIENT
Start: 2024-06-12 | End: 2025-06-12

## 2024-06-12 RX ORDER — POTASSIUM CHLORIDE 20 MEQ/1
TABLET, EXTENDED RELEASE ORAL
Qty: 180 TABLET | Refills: 3 | Status: SHIPPED | OUTPATIENT
Start: 2024-06-12

## 2024-07-16 ENCOUNTER — TELEPHONE (OUTPATIENT)
Dept: FAMILY MEDICINE | Facility: CLINIC | Age: 64
End: 2024-07-16
Payer: MEDICARE

## 2024-07-16 NOTE — TELEPHONE ENCOUNTER
----- Message from Flora Markham sent at 7/16/2024 11:25 AM CDT -----  Regarding: samples  Patient requesting samples of myrbetriq 50mg

## 2024-07-16 NOTE — TELEPHONE ENCOUNTER
LVM on patient's phone that we don't have any samples for myrbetriq 50mg available at the moment.    Patient Education     What Are Cataracts?    A clear lens in the eye focuses light. This lets the eye see images sharply. Aging is the most common cause of cataracts. With age, the lens slowly becomes cloudy. The cloudy lens is a cataract. A cataract scatters light and makes it hard for the eye to focus. Cataracts often form in both eyes. But one lens may cloud faster than the other.  The aging of your lens  Your lens may cloud so slowly that you don`t notice any vision changes at first. But as the cataract gets worse, the eye has a harder time focusing. In early stages, glasses may help you see better. As the lens gets more cloudy, your healthcare provider may recommend surgery to restore your vision.  A clear lens allows your eye to bring objects sharply into focus.  A mild cataract may slightly blur your vision.  A dense cataract can severely blur your vision.  Date Last Reviewed: 11/1/2017  © 1831-1975 [employer]. [address] [place] [address]. All rights reserved. This information is not intended as a substitute for professional medical care. Always follow your healthcare professional's instructions.         Patient Education     Clear-Cornea Cataract Surgery: Removing the Old Lens     Incision Size. A smaller incision (top) means a shorter recovery time for you. The location of the incision will vary.    You may be surprised by how little time clear-cornea cataract surgery takes. To help make the surgery painless, you will be given anesthesia. It may be in the form of eyedrops (topical) or an injection (local).  The Procedure  Your doctor uses a microscope and tiny instruments to make the incision and remove the old lens. A special instrument breaks apart the old lens with sound waves (ultrasound) and then removes the pieces. This process is called phacoemulsification. The natural membrane (capsule) that held your lens is left in place.    © 6750-4755 [employer] [address] [id number]. All rights reserved. This information is not intended as a substitute for professional medical care. Always follow your healthcare professional's instructions.         Patient Education     Clear-Cornea Cataract Surgery: Implanting the New Lens     An IOL folded to half its size allows a smaller incision.        How Small Is an IOL?   Once your old lens has been removed, your doctor slips the new lens (IOL) in through the incision. The IOL is then positioned in the capsule that held your old lens. With the new lens in place, your doctor is ready to close the incision. In most cases, the incision is self-sealing (no-stitch). That means it will stay closed on its own without stitches. But sometimes, a stitch may be needed. The IOL does much the same thing as your old lens did before it became cloudy. It focuses light, letting you see sharp images and vivid colors. The IOL normally lasts a lifetime.     The IOL unfolds as it is inserted into the eye's natural capsule. Flexible tabs hold the IOL in place.   © 2604-6134 [employer]. [address] [place] [address]. All rights reserved. This information is not intended as a substitute for professional medical care. Always follow your healthcare professional's instructions.         Patient Education     After Cataract Surgery: Long-Term Eye Care    After cataract surgery, it is important to have regular dilated eye exams. This is the best way to check the health of your eyes. It will help you maintain good vision. Schedule an eye exam at least once a year or as directed by your eye care professional.  New eyeglasses  Your vision will be better after surgery. But you may need new eyeglasses to fine-tune your vision. Your eye healthcare provider will test your vision while you’re healing. When you’re fully healed, you'll get a new eyeglass prescription if needed.  Treating a secondary cataract  Months or years  after surgery, a secondary cataract may form. It is caused by cells that grow between your new lens and the capsule that holds it. This cataract is not painful, but it may cause vision problems similar to the first cataract. In most cases, this cataract can be treated in your eye healthcare provider's office or an outpatient surgical center.  Laser treatment (YAG capsulotomy) can be used for this. It is a painless procedure. It only takes a few minutes. A laser beam is used to make a small opening in the eye’s capsule. This allows more light to enter. It will improve your vision right away.  Date Last Reviewed: 11/1/2017 © 2000-2018 [employer]. [address] [place] [address], [place] [address]. All rights reserved. This information is not intended as a substitute for professional medical care. Always follow your healthcare professional's instructions.         Thank you for choosing us for your eyecare needs.    You will receive a lot of information today at your appointment.  If you feel that any of your questions have not been answered today or if you would like more information about what was discussed at today's visit, please don't hesitate to reach out to the doctor or one of our staff members before leaving or by calling [phone] and pressing 1 for the  if you think of something after you have left your appointment.      Please,  Complete the survey about today's visit if you receive one, so that we can continue to give you the best care possible.  Your eye health and satisfaction with today's appointment is our first priority and we strive to give you the best service.  Please be aware that while most eye illnesses cannot be prevented like other types of illnesses, there are things you can do to slow the progression of certain eye illnesses.  Such as: routine eye exams, wearing sunglasses and protective goggles, eating a nutrient rich diet, and reducing your tobacco intake.    We welcome  ALL feedback so we can maintain a high level of care.  We look forward to hearing your thoughts about the care you received today.

## 2024-07-23 ENCOUNTER — LAB VISIT (OUTPATIENT)
Dept: LAB | Facility: HOSPITAL | Age: 64
End: 2024-07-23
Attending: FAMILY MEDICINE
Payer: MEDICARE

## 2024-07-23 ENCOUNTER — OFFICE VISIT (OUTPATIENT)
Dept: FAMILY MEDICINE | Facility: CLINIC | Age: 64
End: 2024-07-23
Payer: MEDICARE

## 2024-07-23 VITALS
BODY MASS INDEX: 53.92 KG/M2 | SYSTOLIC BLOOD PRESSURE: 140 MMHG | TEMPERATURE: 97 F | HEART RATE: 76 BPM | HEIGHT: 62 IN | OXYGEN SATURATION: 98 % | WEIGHT: 293 LBS | DIASTOLIC BLOOD PRESSURE: 84 MMHG

## 2024-07-23 DIAGNOSIS — I10 ESSENTIAL HYPERTENSION: ICD-10-CM

## 2024-07-23 DIAGNOSIS — E11.628 TYPE 2 DIABETES MELLITUS WITH OTHER SKIN COMPLICATION, WITHOUT LONG-TERM CURRENT USE OF INSULIN: ICD-10-CM

## 2024-07-23 DIAGNOSIS — E11.628 TYPE 2 DIABETES MELLITUS WITH OTHER SKIN COMPLICATION, WITHOUT LONG-TERM CURRENT USE OF INSULIN: Primary | ICD-10-CM

## 2024-07-23 PROBLEM — M54.12 CERVICAL RADICULITIS: Status: ACTIVE | Noted: 2024-05-08

## 2024-07-23 LAB
ALBUMIN SERPL-MCNC: 3.8 G/DL (ref 3.4–4.8)
ALBUMIN/GLOB SERPL: 1.2 RATIO (ref 1.1–2)
ALP SERPL-CCNC: 92 UNIT/L (ref 40–150)
ALT SERPL-CCNC: 18 UNIT/L (ref 0–55)
ANION GAP SERPL CALC-SCNC: 11 MEQ/L
AST SERPL-CCNC: 15 UNIT/L (ref 5–34)
BILIRUB SERPL-MCNC: 0.5 MG/DL
BUN SERPL-MCNC: 14 MG/DL (ref 9.8–20.1)
CALCIUM SERPL-MCNC: 9.3 MG/DL (ref 8.4–10.2)
CHLORIDE SERPL-SCNC: 104 MMOL/L (ref 98–107)
CO2 SERPL-SCNC: 31 MMOL/L (ref 23–31)
CREAT SERPL-MCNC: 0.82 MG/DL (ref 0.55–1.02)
CREAT/UREA NIT SERPL: 17
EST. AVERAGE GLUCOSE BLD GHB EST-MCNC: 116.9 MG/DL
GFR SERPLBLD CREATININE-BSD FMLA CKD-EPI: >60 ML/MIN/1.73/M2
GLOBULIN SER-MCNC: 3.1 GM/DL (ref 2.4–3.5)
GLUCOSE SERPL-MCNC: 102 MG/DL (ref 82–115)
HBA1C MFR BLD: 5.7 %
POTASSIUM SERPL-SCNC: 3.7 MMOL/L (ref 3.5–5.1)
PROT SERPL-MCNC: 6.9 GM/DL (ref 5.8–7.6)
SODIUM SERPL-SCNC: 146 MMOL/L (ref 136–145)

## 2024-07-23 PROCEDURE — 3061F NEG MICROALBUMINURIA REV: CPT | Mod: CPTII,,, | Performed by: FAMILY MEDICINE

## 2024-07-23 PROCEDURE — 3066F NEPHROPATHY DOC TX: CPT | Mod: CPTII,,, | Performed by: FAMILY MEDICINE

## 2024-07-23 PROCEDURE — 1160F RVW MEDS BY RX/DR IN RCRD: CPT | Mod: CPTII,,, | Performed by: FAMILY MEDICINE

## 2024-07-23 PROCEDURE — 99214 OFFICE O/P EST MOD 30 MIN: CPT | Mod: ,,, | Performed by: FAMILY MEDICINE

## 2024-07-23 PROCEDURE — 1101F PT FALLS ASSESS-DOCD LE1/YR: CPT | Mod: CPTII,,, | Performed by: FAMILY MEDICINE

## 2024-07-23 PROCEDURE — 3008F BODY MASS INDEX DOCD: CPT | Mod: CPTII,,, | Performed by: FAMILY MEDICINE

## 2024-07-23 PROCEDURE — 80053 COMPREHEN METABOLIC PANEL: CPT

## 2024-07-23 PROCEDURE — 1126F AMNT PAIN NOTED NONE PRSNT: CPT | Mod: CPTII,,, | Performed by: FAMILY MEDICINE

## 2024-07-23 PROCEDURE — 36415 COLL VENOUS BLD VENIPUNCTURE: CPT

## 2024-07-23 PROCEDURE — 83036 HEMOGLOBIN GLYCOSYLATED A1C: CPT

## 2024-07-23 PROCEDURE — 3288F FALL RISK ASSESSMENT DOCD: CPT | Mod: CPTII,,, | Performed by: FAMILY MEDICINE

## 2024-07-23 PROCEDURE — 1159F MED LIST DOCD IN RCRD: CPT | Mod: CPTII,,, | Performed by: FAMILY MEDICINE

## 2024-07-23 PROCEDURE — 3077F SYST BP >= 140 MM HG: CPT | Mod: CPTII,,, | Performed by: FAMILY MEDICINE

## 2024-07-23 PROCEDURE — 4010F ACE/ARB THERAPY RXD/TAKEN: CPT | Mod: CPTII,,, | Performed by: FAMILY MEDICINE

## 2024-07-23 PROCEDURE — 3079F DIAST BP 80-89 MM HG: CPT | Mod: CPTII,,, | Performed by: FAMILY MEDICINE

## 2024-07-23 PROCEDURE — 3044F HG A1C LEVEL LT 7.0%: CPT | Mod: CPTII,,, | Performed by: FAMILY MEDICINE

## 2024-07-23 RX ORDER — MUPIROCIN 20 MG/G
OINTMENT TOPICAL 3 TIMES DAILY
Qty: 30 G | Refills: 3 | Status: SHIPPED | OUTPATIENT
Start: 2024-07-23 | End: 2024-10-21

## 2024-07-23 NOTE — PROGRESS NOTES
Patient ID: [id number]     Chief Complaint: Diabetes (No concerns labs completed this morning. RECHECK BP before she leaves)    HPI:     Kimberly Agarwal is a 64 y.o. female here today for Diabetes (No concerns labs completed this morning. RECHECK BP before she leaves).       -------------------------------------    Achilles tendinitis, left leg    Astigmatism    Bursitis of right shoulder    Chronic back pain    Chronic pain    GERD (gastroesophageal reflux disease)    Hepatitis a without hepatic coma    Herpes labialis    High cholesterol    HLD (hyperlipidemia)    HTN (hypertension)    Hyperopia    Hypokalemia    Hypokalemia    Lower extremity edema    OAB (overactive bladder)    Personal history of colonic polyps    s/p polypectomy - Dr Matheus Alba    Presbyopia    RLS (restless legs syndrome)    Sciatica    Sleep apnea    CPAP    Sleep disorder    Spondylosis of lumbosacral spine with radiculopathy    Type 2 diabetes mellitus with unspecified diabetic retinopathy without macular edema    Urinary incontinence, mixed    Vitamin D deficiency    Wound of right leg        Past Surgical History:   Procedure Laterality Date    APPENDECTOMY      BLOCK, NERVE, GENICULAR Bilateral 05/05/2023    Dr Phillip Clement    COLONOSCOPY W/ BIOPSIES AND POLYPECTOMY  04/27/2022    Dr Matheus Alba    COLONOSCOPY W/ BIOPSIES AND POLYPECTOMY  12/09/2019    Dr Matheus Alba    EPIDURAL STEROID INJECTION INTO LUMBAR SPINE      Dr Lorelei Schmidt    ESOPHAGOGASTRODUODENOSCOPY  04/27/2022    Dr Matheus Alba    ESOPHAGOGASTRODUODENOSCOPY  12/09/2019    Dr Matheus Alba    LIVER BIOPSY      LUMBAR FUSION  2010    Fused L4-5    OPEN REDUCTION AND INTERNAL FIXATION (ORIF) OF INJURY OF WRIST Right     RADIOFREQUENCY ABLATION OF LUMBAR MEDIAL BRANCH NERVE AT SINGLE LEVEL Bilateral 09/09/2022    Dr Phillip Clement MD    RADIOFREQUENCY ABLATION OF LUMBAR MEDIAL BRANCH NERVE AT SINGLE LEVEL Bilateral 9/8/2023    Procedure: RADIOFREQUENCY ABLATION, NERVE,  SPINAL, LUMBAR, MEDIAL BRANCH, 1 LEVEL (Bilateral L3-5 RFA) *Latex Allergy*;  Surgeon: Phillip Clement MD;  Location: [facility];  Service: Pain Management;  Laterality: Bilateral;    SPINE SURGERY      TONSILLECTOMY      TOTAL KNEE ARTHROPLASTY Left 2020       Review of patient's allergies indicates:   Allergen Reactions    Latex Rash    Adhesive tape-silicones Blisters    Ciprofloxacin        Outpatient Medications Marked as Taking for the 7/23/24 encounter (Office Visit) with Kai Back,    Medication Sig Dispense Refill    betamethasone valerate 0.1% (VALISONE) 0.1 % Crea APPLY 1 APPLICATION TOPICALLY 2 (TWO) TIMES A DAY. 45 g 3    calcium carbonate/vitamin D3 (CALTRATE 600 PLUS D ORAL) Take 1 tablet by mouth 2 (two) times daily.      cetirizine (ZYRTEC) 10 MG tablet Take 10 mg by mouth every evening.      cyclobenzaprine (FLEXERIL) 10 MG tablet TAKE 1 TABLET THREE TIMES DAILY AS NEEDED FOR MUSCLE SPASM(S) 270 tablet 3    DULoxetine (CYMBALTA) 60 MG capsule every morning.      esomeprazole (NEXIUM) 40 MG capsule TAKE 1 CAPSULE BEFORE BREAKFAST 90 capsule 3    furosemide (LASIX) 40 MG tablet TAKE 1 TABLET TWICE DAILY 180 tablet 3    HYDROcodone-acetaminophen (NORCO) 7.5-325 mg per tablet Take 1 tablet by mouth 2 (two) times daily as needed for Pain.      losartan (COZAAR) 25 MG tablet Take 1 tablet (25 mg total) by mouth every morning. 90 tablet 1    losartan-hydrochlorothiazide 50-12.5 mg (HYZAAR) 50-12.5 mg per tablet Take 1 tablet by mouth every morning. 90 tablet 3    lutein 40 mg Cap Take 1 capsule by mouth every morning.      metFORMIN (GLUCOPHAGE) 500 MG tablet Take 1 tablet (500 mg total) by mouth 2 (two) times daily with meals. 180 tablet 3    metroNIDAZOLE (METROGEL) 0.75 % gel Apply topically 2 (two) times daily. 135 g 0    MYRBETRIQ 50 mg Tb24 Take 1 tablet by mouth nightly.      polyethylene glycol (GLYCOLAX) 17 gram PwPk Take 17 g by mouth every evening.      potassium chloride SA  (K-DUR,KLOR-CON) 20 MEQ tablet TAKE 1 TABLET TWICE DAILY 180 tablet 3    pramipexole (MIRAPEX) 0.125 MG tablet TAKE 1 TABLET EVERY EVENING 90 tablet 3    pregabalin (LYRICA) 150 MG capsule Take 150 mg by mouth 2 (two) times a day.      simvastatin (ZOCOR) 20 MG tablet TAKE 1 TABLET EVERY EVENING 90 tablet 3    TRUE METRIX GLUCOSE TEST STRIP Strp       TRUEPLUS LANCETS 30 gauge Misc       valACYclovir (VALTREX) 500 MG tablet Take 500 mg by mouth daily as needed.         Social History     Socioeconomic History    Marital status:    Tobacco Use    Smoking status: Former     Current packs/day: 0.25     Average packs/day: 0.3 packs/day for 5.0 years (1.3 ttl pk-yrs)     Types: Cigarettes    Smokeless tobacco: Never   Substance and Sexual Activity    Alcohol use: Not Currently    Drug use: Never    Sexual activity: Not Currently     Partners: Male     Social Determinants of Health     Financial Resource Strain: Low Risk  (5/19/2023)    Overall Financial Resource Strain (CARDIA)     Difficulty of Paying Living Expenses: Not hard at all   Food Insecurity: No Food Insecurity (5/19/2023)    Hunger Vital Sign     Worried About Running Out of Food in the Last Year: Never true     Ran Out of Food in the Last Year: Never true   Transportation Needs: No Transportation Needs (5/19/2023)    PRAPARE - Transportation     Lack of Transportation (Medical): No     Lack of Transportation (Non-Medical): No   Physical Activity: Inactive (5/19/2023)    Exercise Vital Sign     Days of Exercise per Week: 0 days     Minutes of Exercise per Session: 0 min   Stress: Stress Concern Present (5/19/2023)    [employer] [place] [employer] - Occupational Stress Questionnaire     Feeling of Stress : To some extent   Housing Stability: Low Risk  (5/19/2023)    Housing Stability Vital Sign     Unable to Pay for Housing in the Last Year: No     Number of Places Lived in the Last Year: 1     Unstable Housing in the Last Year: No         Family History   Problem Relation Name Age of Onset    Hypertension Mother Cole Christensen, mother     Hyperlipidemia Mother Cole Christensen, mother     Stroke Mother Cole Christensen, mother     Hypertension Father Alejo Christensen     Diabetes Father Alejo Christensen     Hyperlipidemia Father Alejo Christensen     Heart disease Father Alejo Christensen     Diabetes Sister Adela Downey     Pancreatic cancer Sister Adela Downey 68        Cause of death    Liver cancer Sister Adela Downey 68        Cause of death    Asthma Brother Preston Francoque I     Cancer Brother Preston Francoque I     COPD Brother Preston Francoque I     Heart disease Brother Preston Francoque I     Diabetes Mellitus Brother Preston Fermin I     Diabetes Brother Catrachito Femrin     Heart disease Brother Catrachito Fermin     Diabetes Brother Soy Fermin     Heart disease Brother Soy Fermin     Diabetes Brother Carlitos Fermin     Diabetes Brother Josias Fermin     Heart disease Brother Josias Christensen         Patient Care Team:  Kai Back DO as PCP - General (Family Medicine)  Phillip Clement MD as Consulting Physician (Pain Medicine)  Matheus Alba MD as Consulting Physician (Gastroenterology)  Lorelei Schmidt MD as Anesthesiologist (Interventional Pain Medicine)  Yeison Leal MD (Orthopedic Surgery)  [place] ([facility] Services)  Chapo Steele MD (Obstetrics and Gynecology)  Kirit De OD (Optometry)     Subjective:     Review of Systems   Constitutional:  Negative for chills and fever.   Respiratory:  Negative for shortness of breath.    Cardiovascular:  Negative for chest pain.   Gastrointestinal:  Negative for constipation and diarrhea.   Neurological:  Negative for dizziness and headaches.   Psychiatric/Behavioral:  The patient does not have insomnia.      See HPI for details  All Other ROS: Negative except as stated in HPI.     Objective:     BP (!) 140/84   Pulse 76   Temp 96.8 °F (36 °C)   " Ht 5' 1.81" (1.57 m)   Wt (!) 140.6 kg (310 lb)   SpO2 98%   BMI 57.05 kg/m²     Physical Exam  Vitals reviewed.   Constitutional:       General: She is not in acute distress.     Appearance: Normal appearance. She is obese.   Cardiovascular:      Rate and Rhythm: Normal rate and regular rhythm.      Heart sounds: No murmur heard.     No friction rub. No gallop.   Pulmonary:      Effort: No respiratory distress.      Breath sounds: No wheezing, rhonchi or rales.   Musculoskeletal:         General: No swelling, tenderness or deformity.      Right lower leg: No edema.      Left lower leg: No edema.   Skin:     General: Skin is warm and dry.      Findings: No lesion or rash.   Neurological:      General: No focal deficit present.      Mental Status: She is alert.   Psychiatric:         Mood and Affect: Mood normal.         Assessment/Plan:     1. Type 2 diabetes mellitus with other skin complication, without long-term current use of insulin  -     mupirocin (BACTROBAN) 2 % ointment; Apply topically 3 (three) times daily.  Dispense: 30 g; Refill: 3    2. Essential hypertension      Mildly elevated but improved on recheck. Patient is experiencing increased pain today and did not take her Norco as she would be driving.   Follow up:     Follow up in about 6 months (around 1/23/2025) for Medicare Wellness with fasting labs. In addition to their scheduled follow up, the patient has also been instructed to follow up on as needed basis.         "

## 2024-07-31 DIAGNOSIS — I10 ESSENTIAL HYPERTENSION: ICD-10-CM

## 2024-07-31 DIAGNOSIS — E11.628 TYPE 2 DIABETES MELLITUS WITH OTHER SKIN COMPLICATION, WITHOUT LONG-TERM CURRENT USE OF INSULIN: ICD-10-CM

## 2024-07-31 RX ORDER — LOSARTAN POTASSIUM 25 MG/1
25 TABLET ORAL EVERY MORNING
Qty: 90 TABLET | Refills: 1 | Status: SHIPPED | OUTPATIENT
Start: 2024-07-31 | End: 2025-01-27

## 2024-07-31 RX ORDER — METFORMIN HYDROCHLORIDE 500 MG/1
500 TABLET ORAL 2 TIMES DAILY WITH MEALS
Qty: 180 TABLET | Refills: 1 | Status: SHIPPED | OUTPATIENT
Start: 2024-07-31 | End: 2025-07-31

## 2024-08-05 ENCOUNTER — TELEPHONE (OUTPATIENT)
Dept: FAMILY MEDICINE | Facility: CLINIC | Age: 64
End: 2024-08-05
Payer: MEDICARE

## 2024-08-05 DIAGNOSIS — N32.81 OVERACTIVE BLADDER: Primary | ICD-10-CM

## 2024-08-07 ENCOUNTER — TELEPHONE (OUTPATIENT)
Dept: FAMILY MEDICINE | Facility: CLINIC | Age: 64
End: 2024-08-07
Payer: MEDICARE

## 2024-08-07 DIAGNOSIS — N32.81 OVERACTIVE BLADDER: Primary | ICD-10-CM

## 2024-08-09 DIAGNOSIS — N32.81 OVERACTIVE BLADDER: ICD-10-CM

## 2024-08-09 RX ORDER — OXYBUTYNIN CHLORIDE 5 MG/1
5 TABLET, EXTENDED RELEASE ORAL DAILY
Qty: 30 TABLET | Refills: 11 | Status: SHIPPED | OUTPATIENT
Start: 2024-08-09 | End: 2025-08-09

## 2024-08-12 RX ORDER — OXYBUTYNIN CHLORIDE 5 MG/1
5 TABLET, EXTENDED RELEASE ORAL
Qty: 30 TABLET | Refills: 11 | OUTPATIENT
Start: 2024-08-12

## 2024-08-13 ENCOUNTER — TELEPHONE (OUTPATIENT)
Dept: FAMILY MEDICINE | Facility: CLINIC | Age: 64
End: 2024-08-13
Payer: MEDICARE

## 2024-08-13 DIAGNOSIS — N32.81 OVERACTIVE BLADDER: Primary | ICD-10-CM

## 2024-08-15 DIAGNOSIS — N32.81 OVERACTIVE BLADDER: Primary | ICD-10-CM

## 2024-08-15 RX ORDER — MIRABEGRON 50 MG/1
1 TABLET, EXTENDED RELEASE ORAL DAILY
Qty: 30 TABLET | Refills: 3 | Status: SHIPPED | OUTPATIENT
Start: 2024-08-15 | End: 2025-08-15

## 2024-08-15 RX ORDER — MIRABEGRON 25 MG/1
25 TABLET, FILM COATED, EXTENDED RELEASE ORAL DAILY
Qty: 30 TABLET | Refills: 11 | Status: SHIPPED | OUTPATIENT
Start: 2024-08-15 | End: 2024-08-15

## 2024-08-15 NOTE — TELEPHONE ENCOUNTER
I will try to send in the generic to see if it is covered. The samples are getting harder to acquire so if the generic is not covered she may need to look into changing her insurance plan to one that covers the medications she needs.

## 2024-08-25 DIAGNOSIS — E11.628 TYPE 2 DIABETES MELLITUS WITH OTHER SKIN COMPLICATION, WITHOUT LONG-TERM CURRENT USE OF INSULIN: Primary | ICD-10-CM

## 2024-08-26 RX ORDER — CALCIUM CITRATE/VITAMIN D3 200MG-6.25
TABLET ORAL
Qty: 100 STRIP | Refills: 3 | Status: SHIPPED | OUTPATIENT
Start: 2024-08-26

## 2024-08-26 RX ORDER — GLUCOSAM/CHON-MSM1/C/MANG/BOSW 500-416.6
TABLET ORAL
Qty: 100 EACH | Refills: 3 | Status: SHIPPED | OUTPATIENT
Start: 2024-08-26

## 2024-09-04 ENCOUNTER — TELEPHONE (OUTPATIENT)
Dept: FAMILY MEDICINE | Facility: CLINIC | Age: 64
End: 2024-09-04
Payer: MEDICARE

## 2024-09-09 ENCOUNTER — TELEPHONE (OUTPATIENT)
Dept: FAMILY MEDICINE | Facility: CLINIC | Age: 64
End: 2024-09-09
Payer: MEDICARE

## 2024-09-09 VITALS — DIASTOLIC BLOOD PRESSURE: 71 MMHG | SYSTOLIC BLOOD PRESSURE: 137 MMHG

## 2024-11-06 ENCOUNTER — HOSPITAL ENCOUNTER (OUTPATIENT)
Dept: RADIOLOGY | Facility: HOSPITAL | Age: 64
Discharge: HOME | End: 2024-11-06
Attending: OBSTETRICS & GYNECOLOGY
Payer: MEDICARE

## 2024-11-06 DIAGNOSIS — Z12.31 ENCOUNTER FOR SCREENING MAMMOGRAM FOR MALIGNANT NEOPLASM OF BREAST: ICD-10-CM

## 2024-11-06 PROCEDURE — 77067 SCR MAMMO BI INCL CAD: CPT | Mod: TC

## 2024-12-19 DIAGNOSIS — E55.9 VITAMIN D DEFICIENCY: ICD-10-CM

## 2024-12-19 DIAGNOSIS — E78.2 MIXED HYPERLIPIDEMIA: Primary | Chronic | ICD-10-CM

## 2024-12-19 DIAGNOSIS — Z00.00 WELLNESS EXAMINATION: ICD-10-CM

## 2024-12-19 DIAGNOSIS — I10 ESSENTIAL HYPERTENSION: ICD-10-CM

## 2024-12-19 DIAGNOSIS — E11.620 TYPE 2 DIABETES MELLITUS WITH DIABETIC DERMATITIS, WITHOUT LONG-TERM CURRENT USE OF INSULIN: ICD-10-CM

## 2024-12-20 DIAGNOSIS — E11.628 TYPE 2 DIABETES MELLITUS WITH OTHER SKIN COMPLICATION, WITHOUT LONG-TERM CURRENT USE OF INSULIN: ICD-10-CM

## 2024-12-23 RX ORDER — METFORMIN HYDROCHLORIDE 500 MG/1
500 TABLET ORAL 2 TIMES DAILY WITH MEALS
Qty: 180 TABLET | Refills: 3 | Status: SHIPPED | OUTPATIENT
Start: 2024-12-23

## 2024-12-25 DIAGNOSIS — I10 ESSENTIAL HYPERTENSION: ICD-10-CM

## 2024-12-26 RX ORDER — LOSARTAN POTASSIUM 25 MG/1
25 TABLET ORAL EVERY MORNING
Qty: 90 TABLET | Refills: 3 | Status: SHIPPED | OUTPATIENT
Start: 2024-12-26

## 2025-01-19 DIAGNOSIS — G25.81 RESTLESS LEGS: ICD-10-CM

## 2025-01-19 DIAGNOSIS — G89.4 CHRONIC PAIN SYNDROME: ICD-10-CM

## 2025-01-19 DIAGNOSIS — E78.00 PURE HYPERCHOLESTEROLEMIA: ICD-10-CM

## 2025-01-23 RX ORDER — CYCLOBENZAPRINE HCL 10 MG
10 TABLET ORAL
Qty: 270 TABLET | Refills: 3 | Status: SHIPPED | OUTPATIENT
Start: 2025-01-23

## 2025-01-23 RX ORDER — SIMVASTATIN 20 MG/1
20 TABLET, FILM COATED ORAL NIGHTLY
Qty: 90 TABLET | Refills: 3 | Status: SHIPPED | OUTPATIENT
Start: 2025-01-23

## 2025-01-23 RX ORDER — PRAMIPEXOLE DIHYDROCHLORIDE 0.12 MG/1
0.12 TABLET ORAL NIGHTLY
Qty: 90 TABLET | Refills: 3 | Status: SHIPPED | OUTPATIENT
Start: 2025-01-23

## 2025-01-28 ENCOUNTER — LAB VISIT (OUTPATIENT)
Dept: LAB | Facility: HOSPITAL | Age: 65
End: 2025-01-28
Attending: FAMILY MEDICINE
Payer: MEDICARE

## 2025-01-28 DIAGNOSIS — Z00.00 WELLNESS EXAMINATION: ICD-10-CM

## 2025-01-28 DIAGNOSIS — E11.628 TYPE 2 DIABETES MELLITUS WITH OTHER SKIN COMPLICATION, WITHOUT LONG-TERM CURRENT USE OF INSULIN: ICD-10-CM

## 2025-01-28 DIAGNOSIS — I10 ESSENTIAL HYPERTENSION: ICD-10-CM

## 2025-01-28 DIAGNOSIS — E78.2 MIXED HYPERLIPIDEMIA: ICD-10-CM

## 2025-01-28 LAB
ALBUMIN SERPL-MCNC: 3.8 G/DL (ref 3.4–4.8)
ALBUMIN/GLOB SERPL: 1.2 RATIO (ref 1.1–2)
ALP SERPL-CCNC: 79 UNIT/L (ref 40–150)
ALT SERPL-CCNC: 12 UNIT/L (ref 0–55)
ANION GAP SERPL CALC-SCNC: 10 MEQ/L
AST SERPL-CCNC: 13 UNIT/L (ref 5–34)
BASOPHILS # BLD AUTO: 0.04 X10(3)/MCL
BASOPHILS NFR BLD AUTO: 0.7 %
BILIRUB SERPL-MCNC: 0.4 MG/DL
BUN SERPL-MCNC: 15 MG/DL (ref 9.8–20.1)
CALCIUM SERPL-MCNC: 9.7 MG/DL (ref 8.4–10.2)
CHLORIDE SERPL-SCNC: 107 MMOL/L (ref 98–107)
CHOLEST SERPL-MCNC: 160 MG/DL
CHOLEST/HDLC SERPL: 4 {RATIO} (ref 0–5)
CO2 SERPL-SCNC: 29 MMOL/L (ref 23–31)
CREAT SERPL-MCNC: 0.74 MG/DL (ref 0.55–1.02)
CREAT/UREA NIT SERPL: 20
EOSINOPHIL # BLD AUTO: 0.14 X10(3)/MCL (ref 0–0.9)
EOSINOPHIL NFR BLD AUTO: 2.4 %
ERYTHROCYTE [DISTWIDTH] IN BLOOD BY AUTOMATED COUNT: 14.5 % (ref 11.5–17)
GFR SERPLBLD CREATININE-BSD FMLA CKD-EPI: >60 ML/MIN/1.73/M2
GLOBULIN SER-MCNC: 3.3 GM/DL (ref 2.4–3.5)
GLUCOSE SERPL-MCNC: 102 MG/DL (ref 82–115)
HCT VFR BLD AUTO: 42 % (ref 37–47)
HCV AB SERPL QL IA: NONREACTIVE
HDLC SERPL-MCNC: 42 MG/DL (ref 35–60)
HGB BLD-MCNC: 13 G/DL (ref 12–16)
IMM GRANULOCYTES # BLD AUTO: 0.03 X10(3)/MCL (ref 0–0.04)
IMM GRANULOCYTES NFR BLD AUTO: 0.5 %
LDLC SERPL CALC-MCNC: 80 MG/DL (ref 50–140)
LYMPHOCYTES # BLD AUTO: 1.41 X10(3)/MCL (ref 0.6–4.6)
LYMPHOCYTES NFR BLD AUTO: 24.1 %
MCH RBC QN AUTO: 28.9 PG (ref 27–31)
MCHC RBC AUTO-ENTMCNC: 31 G/DL (ref 33–36)
MCV RBC AUTO: 93.3 FL (ref 80–94)
MONOCYTES # BLD AUTO: 0.54 X10(3)/MCL (ref 0.1–1.3)
MONOCYTES NFR BLD AUTO: 9.2 %
NEUTROPHILS # BLD AUTO: 3.68 X10(3)/MCL (ref 2.1–9.2)
NEUTROPHILS NFR BLD AUTO: 63.1 %
NRBC BLD AUTO-RTO: 0 %
PLATELET # BLD AUTO: 208 X10(3)/MCL (ref 130–400)
PMV BLD AUTO: 10.9 FL (ref 7.4–10.4)
POTASSIUM SERPL-SCNC: 4 MMOL/L (ref 3.5–5.1)
PROT SERPL-MCNC: 7.1 GM/DL (ref 5.8–7.6)
RBC # BLD AUTO: 4.5 X10(6)/MCL (ref 4.2–5.4)
SODIUM SERPL-SCNC: 146 MMOL/L (ref 136–145)
TRIGL SERPL-MCNC: 191 MG/DL (ref 37–140)
VLDLC SERPL CALC-MCNC: 38 MG/DL
WBC # BLD AUTO: 5.84 X10(3)/MCL (ref 4.5–11.5)

## 2025-01-28 PROCEDURE — 86803 HEPATITIS C AB TEST: CPT

## 2025-01-28 PROCEDURE — 80061 LIPID PANEL: CPT

## 2025-01-28 PROCEDURE — 80053 COMPREHEN METABOLIC PANEL: CPT

## 2025-01-28 PROCEDURE — 36415 COLL VENOUS BLD VENIPUNCTURE: CPT

## 2025-01-28 PROCEDURE — 85025 COMPLETE CBC W/AUTO DIFF WBC: CPT

## 2025-01-28 PROCEDURE — 83036 HEMOGLOBIN GLYCOSYLATED A1C: CPT

## 2025-01-29 ENCOUNTER — OFFICE VISIT (OUTPATIENT)
Dept: FAMILY MEDICINE | Facility: CLINIC | Age: 65
End: 2025-01-29
Payer: MEDICARE

## 2025-01-29 ENCOUNTER — CLINICAL SUPPORT (OUTPATIENT)
Dept: FAMILY MEDICINE | Facility: CLINIC | Age: 65
End: 2025-01-29
Payer: MEDICARE

## 2025-01-29 VITALS
OXYGEN SATURATION: 96 % | HEART RATE: 85 BPM | RESPIRATION RATE: 20 BRPM | BODY MASS INDEX: 53.92 KG/M2 | TEMPERATURE: 98 F | WEIGHT: 293 LBS | SYSTOLIC BLOOD PRESSURE: 134 MMHG | HEIGHT: 62 IN | DIASTOLIC BLOOD PRESSURE: 81 MMHG

## 2025-01-29 DIAGNOSIS — E11.628 TYPE 2 DIABETES MELLITUS WITH OTHER SKIN COMPLICATION, WITHOUT LONG-TERM CURRENT USE OF INSULIN: ICD-10-CM

## 2025-01-29 DIAGNOSIS — Z72.3 LACK OF PHYSICAL ACTIVITY: ICD-10-CM

## 2025-01-29 DIAGNOSIS — Z00.00 MEDICARE ANNUAL WELLNESS VISIT, SUBSEQUENT: Primary | ICD-10-CM

## 2025-01-29 DIAGNOSIS — E66.01 CLASS 3 SEVERE OBESITY DUE TO EXCESS CALORIES WITH SERIOUS COMORBIDITY AND BODY MASS INDEX (BMI) OF 50.0 TO 59.9 IN ADULT: Chronic | ICD-10-CM

## 2025-01-29 DIAGNOSIS — E55.9 VITAMIN D DEFICIENCY: ICD-10-CM

## 2025-01-29 DIAGNOSIS — L81.9 DISCOLORATION OF SKIN OF LOWER LEG: ICD-10-CM

## 2025-01-29 DIAGNOSIS — E66.813 CLASS 3 SEVERE OBESITY DUE TO EXCESS CALORIES WITH SERIOUS COMORBIDITY AND BODY MASS INDEX (BMI) OF 50.0 TO 59.9 IN ADULT: Chronic | ICD-10-CM

## 2025-01-29 PROBLEM — L29.9 ITCHING: Status: RESOLVED | Noted: 2022-11-04 | Resolved: 2025-01-29

## 2025-01-29 LAB
EST. AVERAGE GLUCOSE BLD GHB EST-MCNC: 116.9 MG/DL
HBA1C MFR BLD: 5.7 %

## 2025-01-29 PROCEDURE — 2025F 7 FLD RTA PHOTO W/O RTNOPTHY: CPT | Mod: CPTII,,, | Performed by: OPTOMETRIST

## 2025-01-29 PROCEDURE — 92228 IMG RTA DETC/MNTR DS PHY/QHP: CPT | Mod: 26,,, | Performed by: OPTOMETRIST

## 2025-01-29 RX ORDER — SEMAGLUTIDE 0.68 MG/ML
0.25 INJECTION, SOLUTION SUBCUTANEOUS
Qty: 1.5 ML | Refills: 0 | Status: SHIPPED | OUTPATIENT
Start: 2025-01-29 | End: 2025-02-28

## 2025-01-29 NOTE — PROGRESS NOTES
Kimberly Agarwal is a 64 y.o. female here for a diabetic eye screening with non-dilated fundus photos per Dedra Rojas NP.    Patient cooperative?: Yes  Small pupils?: No  Last eye exam: unknown    For exam results, see Encounter Report.

## 2025-01-29 NOTE — PATIENT INSTRUCTIONS
Medicare Annual Wellness Visit      Patient Name: Kimberly Agarwal  Today's Date: 1/29/2025    Below you will find your 5-10 year Screening Plan Recommendations:  Health Maintenance       Date Due Completion Date    RSV Vaccine (Age 60+ and Pregnant patients) (1 - Risk 60-74 years 1-dose series) Never done ---    COVID-19 Vaccine (6 - 2024-25 season) 09/01/2024 11/18/2021    Diabetes Urine Screening 01/18/2025 1/18/2024    Hemoglobin A1c 07/28/2025 1/28/2025    Mammogram 11/11/2025 11/11/2024    Lipid Panel 01/28/2026 1/28/2025    Low Dose Statin 01/29/2026 1/29/2025    Foot Exam 01/29/2026 1/29/2025    Override on 1/22/2024: Done    Diabetic Eye Exam 01/29/2026 1/29/2025    DEXA Scan 06/01/2026 6/1/2023    Colorectal Cancer Screening 04/27/2027 4/27/2022    TETANUS VACCINE 10/22/2032 10/22/2022          Below is your summarized Personalized Prevention Plan that addresses any concerns we discussed today at your visit. Please see attached detailed information specific to your Health Concerns.  Orders Placed This Encounter   Procedures    US Lower Extrem Arteries Bilat with VALENTIN (xpd)    Hemoglobin A1C    Microalbumin/Creatinine Ratio, Urine    Urinalysis, Reflex to Urine Culture    Vitamin D    Foot Exam Performed    Diabetic Eye Screening Photo         The following information is provided to all patients.  This information is to help you find additional resources for any problems that may be affecting your health: Living healthy guide: www.[email or web address].[email or web address].gov      Understanding Diabetes: www.diabetes.org      Eating healthy: www.cdc.gov/healthyweight      CDC home safety checklist: www.cdc.gov/steadi/patient.html      Agency on Aging: www.goea.[place].gov      Alcoholics anonymous (AA): www.aa.org      Physical Activity: www.davey.nih.gov/po4kbnd      Tobacco use: www.quitwithusla.org                                 Patient Education       Weight Loss Tips   About this topic   More and more people are concerned  about their weight. You can choose from many different programs. The goal of a weight loss program may be to cut down on calories or to lose extra weight through exercise.  Losing weight may mean changing your ideas about food. Going on a diet and losing weight does not mean starving yourself. It means cutting down on the amount of food you eat, making healthy food choices, and being active.  General   Ideally, you need to lose 1 to 2 pounds (0.5 to 1 kg) a week for a healthy weight loss. Losing too much weight too fast is not good. When you take in fewer calories, you will lose weight. Your ideal calorie and weight goal depends on your current age, weight, height, and personal goals. Ask your doctor or dietitian what your ideal weight is.  You need to burn 3500 calories to lose 1 pound (0.5 kg). That means cutting out 500 calories every day for 7 days. You can cut out 500 calories per day by eating or drinking fewer calories, burning them through exercise, or doing both. To lose that extra weight and stay healthy:  Take time to exercise.  Exercise regularly. Burn calories with activity and exercise. Exercise can help you lose weight and it also strengthens your muscles. Set a schedule where you will have time to do exercises. With just 30 to 60 minutes of exercise each day, you could burn 500 extra calories. Your metabolism stays elevated for a period of time after exercise.  If you don't have time for a 30 minute workout, try three 10 minute exercises each day.  If you work near your home, walk to work. Walking is a very good form of exercise.  Take a 20 minute walk each day. Walk during your lunch break. Park far away, so you have to walk more.  Take the steps instead of elevators. You will burn more calories this way.  If you have an illness, like diabetes or high blood pressure, ask your doctor how much exercise is right for you.  Choose healthy snacks.  Low calorie healthy snacks are a good thing. They help  "your blood sugar stay even and prevent you from overeating at meals. Choose a balanced snack, such as a small apple with 2 tablespoons (30 grams) of peanut butter.  Keep in mind, even "low calorie" foods can add up. Just because you choose low calorie foods does not mean you do not have to count the calories you eat.  Pack a few fresh fruits or a small salad to take to work or school. Avoid buying a snack at the nearest vending machine.  When you feel thirsty, drink water. Water has no calories and is a very good thirst quencher.  Plan healthy meals.  Plan ahead. Keep a diary of foods that are low in calories. You can also make a list of meal plans for your breakfast, lunch, and dinner. Planning ahead will prevent you from eating out at a fast food place or restaurant.  Make a grocery list before shopping so you only buy food you need. Don't go to the store hungry.  Visit a dietitian. This person will help you make meal plans that will help you lose weight.  Add fiber to your meals. Adding fiber helps you to feel full for a longer amount of time.  Take care when eating out.  Choose lower fat and lower calorie meals. Try a seafood, lean meat, or vegetarian entrée.  Share a meal with a friend.  Try a salad and appetizer instead of an entree.  Ask for a to-go box when dinner is served and put half of your meal in it for a later meal.  Have fruit for dessert.  Drink water instead of other high calorie drinks.  Learn not to overeat.  Watch your portions. For example, the recommended serving size of meat is 3 ounces (90 grams). This is the size of a deck of playing cards. Two tablespoons (30 grams) of peanut butter is the size of a ping-pong ball. One medium fruit is the size of a baseball.  Use a smaller plate or glass during dinner for less calorie intake.  Try drinking a glass or two of water before eating. This may make you feel more full and help you to eat less.  Eat slowly. Take at least 30 minutes to eat. This " gives time for your brain to tell your stomach you are full. This will help you avoid overeating.  Some people eat smaller meals more often to help not to overeat. If you can eat six small meals, make them healthy and low calorie. If three meals are best for you, know your calorie level for the day and spread it out into three healthy low calorie meals.     What will the results be?   Losing weight may make you healthier. You also may have more energy for your daily activities. You may lower disease risk. You may also add years to your life.  What changes to diet are needed?   Learn how to read nutrition labels. Know the serving size. Knowing the calories in an item will help you make healthier choices and lose weight.  Keep a diary of the food you eat. This will help you count the calories you are taking in.  Make a menu in advance. This will help you make good choices to include in your diet.  Avoid eating 2 hours before bedtime to allow for digestion. If you eat right before you go to bed, you may also have worse heartburn.  Be sure to count the calories in the things you drink. You may want to stop drinking soda pop, beer, wine, and mixed drinks (alcohol). Some coffee drinks also have a lot of calories in them.  Who should use this diet?   A weight loss diet may be needed for people with a calculated body mass index of 25 and over. This means you are overweight or obese.  Who should not use this diet?   People with BMI of 18.5 or lower should not use this diet. Do not use this diet if your doctor does not recommend weight loss.  What foods are good to eat?   Choose foods that are nutritious. Remember, portion control is key. Even a low calorie food can become high in calories if you have too big of a serving. Here is a list of foods that are good to eat:  Vegetables:   Broccoli  Asparagus  Spinach  Green leafy vegetables  Tomatoes  Onions  Mushrooms  Cucumbers  Zucchini  Lean proteins:   Egg whites  Beans  including kidney, navy, black, and chickpeas  Grilled, broiled, or baked skinless chicken breast  Grilled, broiled, or baked skinless turkey breast  Lean beef  Bishop meat  Grilled, broiled, or baked fish  Beans  Nuts, such as almonds, cashews, and pistachios  Seeds  Whole grains and carbs:   Oatmeal  Brown rice  Sweet potatoes  Cereal  Whole grain bread or pasta  Fruits:   Apples  Grapefruit  Blueberries  Oranges  Bananas  Grapes  Peaches  Pineapple  Strawberries  Dairy:   Fat-free or low-fat milk and cheese  Low fat yogurt  Soy, rice, or almond milk  What foods should be limited or avoided?   Limit or avoid foods that are high in calories like:  Junk foods  Fried foods  Fatty foods  Processed meats  Food with saturated and trans fat  Whole fat dairy products  Butter  Cheese  Ice cream  Food and drinks with a lot of sugar. Some examples are beer, wine, mixed drinks (alcohol), carbonated sodas, cakes, and cookies.  When do I need to call the doctor?   Weakness  Fast heartbeat  Dizziness  Helpful tips   Join a support group and an exercise group. It is much easier to lose weight if you have support and encouragement.  Do not skip meals. If you skip a meal, you most likely will overeat at that next meal.  Eat at the dining room table instead in front of the TV to help monitor intake.  Where can I learn more?   Academy of Nutrition and Dietetics  https://www.eatright.org/health/weight-loss/your-health-and-your-weight/back-to-basics-for-healthy-weight-loss   Centers for Disease Control and Prevention  https://www.cdc.gov/healthyweight/   Weight-Control Information Network  https://www.niddk.nih.gov/health-information/diet-nutrition/changing-habits-better-health   Last Reviewed Date   2021-08-09  Consumer Information Use and Disclaimer   This information is not specific medical advice and does not replace information you receive from your health care provider. This is only a brief summary of general information. It does  NOT include all information about conditions, illnesses, injuries, tests, procedures, treatments, therapies, discharge instructions or life-style choices that may apply to you. You must talk with your health care provider for complete information about your health and treatment options. This information should not be used to decide whether or not to accept your health care providers advice, instructions or recommendations. Only your health care provider has the knowledge and training to provide advice that is right for you.  Copyright   Copyright © 2021 UpToDate, Inc. and its affiliates and/or licensors. All rights reserved.    Patient Education       Health Risks of a High BMI   About this topic   Your weight and health depend on a few things. Doctors use a method called BMI or body mass index as a tool to learn more about your risk of having health problems. This tool uses your weight and your height to find your BMI.  BMI does not include things like your habits, where you live, family history, or amount of body fat. Some people with a normal BMI are still not healthy. Other people with a high BMI may be healthy. Most of the time, a person with a higher BMI is less healthy and will need more care. Ask your doctor for their view of your total health during a well visit or physical.  Being overweight or having a high BMI can hurt many parts of your body. Learn about your BMI and how your weight changes your health risks. Then you can make changes to keep yourself as healthy as you can.  General   Weighing too much can be very harmful to your body. It can cause many illnesses and can make it hard to move about.  Blood Sugar   You have a greater chance of having high blood sugar or diabetes if you weigh too much. Your body normally makes a hormone called insulin. The insulin allows your body to use the sugar in your blood.  The cells in your body may not be able to use insulin. Then your cells cannot get the sugar  from your bloodstream that they need for energy. Your pancreas has to work extra hard to try and make enough insulin to keep your blood sugar healthy.  If you lose weight and exercise, your body is able to control your blood sugar levels better. You may not need as much insulin to keep your blood sugar levels healthy.  Your Heart   Being overweight makes your heart work harder.  The blood vessels that bring blood to your heart muscle may become narrow or blocked and cause a heart attack or your heart may not pump as well as it should. Your heart rate may not be normal.  Losing weight can lower your chances of having problems with your heart.  High Blood Pressure   Your heart has to work harder to pump blood through a larger body and to make sure all of your cells have the oxygen they need.  As your heart has to work harder, your blood pressure goes up.  Being overweight can also harm your kidneys and this may also raise your blood pressure.  You may be able to lower your blood pressure through weight loss and routine exercise.  Stroke   Strokes are more likely to happen when someone has high blood pressure, heart problems, high blood sugar, or high cholesterol.  Being overweight puts you at a higher risk for all of these health problems. These problems put you at a higher risk for having a stroke.  Losing weight may help lower your blood pressure, which is the biggest risk factor for a stroke.  Cholesterol   Your cholesterol level is likely to be higher if you are overweight.  This can lead to narrowing of the blood vessels in your heart, neck, or other parts of your body. Then you may have chest pain or signs of low blood flow to a certain area. It can also lead to a heart attack or stroke.  Lowering your weight and changing what you eat may change your cholesterol levels.  Your Liver and Kidneys   You are more likely to have certain problems with your liver or kidneys if you have a high BMI.  Fatty liver disease  is caused by a buildup of fat in your liver. Then your liver may not work as well as it should.  You are at a higher risk for diabetes if you are overweight. This illness can cause kidney problems.  There is no exact way to treat fatty liver disease. By losing weight, you may keep your liver from getting any worse and help your liver work better.  By losing weight, you lower your chance of having kidney problems. If you already have kidney problems, weight loss may help keep your disease from getting worse.  Bone and Joint Problems   Weighing too much can put a lot of stress on your joints.  The extra weight may make the cartilage wear away more quickly from your bones. Your cartilage lines the surfaces of your bones to help your joints glide more easily.  When your cartilage is worn, your joints become stiff and sore.  Losing weight and exercising is one of the best ways to treat joint pain and stiffness. It can also ease the stress on your hips, knees, and back.  Cancer Risk   Gaining weight and poor health habits raise your risk for some kinds of cancer.  Healthy eating and exercise may lower your risk for some kinds of cancer.  Sleep   With a high BMI, you may have extra fat around your neck. This can make your airway smaller and put you at risk for a problem called sleep apnea. With this problem, your breathing starts and stops when you are sleeping.  Signs of sleep apnea include snoring, feeling sleepy during the day, and problems with focusing.  Sleep apnea can lead to heart problems such as increased risk for heart disease and arrhythmias like atrial fibrillation.  Losing weight can lower the amount of fat around your neck and ease sleep apnea problems.  Pregnancy   Your weight can affect how often you have a period. It may also make it harder for you to get pregnant. A high BMI can cause problems for both mom and baby.  If you are overweight and pregnant you are at a higher risk for high blood sugar or high  blood pressure while you are pregnant.  You are also more likely to have your baby early or to need a C section.  Losing weight before you become pregnant may lower your chance for these problems. If you are pregnant, talk to your doctor before you try to lose weight.  Depression   You are more likely to suffer from depression or low mood when you have a high BMI.  You may have a low mood because of low self-esteem, lack of activity, lack of sleep, and health problems caused by being overweight.  Losing weight and finding out the reasons you overeat are some of the steps to improve your quality of life and deal with depression.  Where can I learn more?   National [place] of Diabetes and Digestive and Kidney Diseases  https://www.niddk.nih.gov/health-information/weight-management/adult-overweight-obesity/health-risks   Last Reviewed Date   2021-09-15  Consumer Information Use and Disclaimer   This information is not specific medical advice and does not replace information you receive from your health care provider. This is only a brief summary of general information. It does NOT include all information about conditions, illnesses, injuries, tests, procedures, treatments, therapies, discharge instructions or life-style choices that may apply to you. You must talk with your health care provider for complete information about your health and treatment options. This information should not be used to decide whether or not to accept your health care providers advice, instructions or recommendations. Only your health care provider has the knowledge and training to provide advice that is right for you.  Copyright   Copyright © 2021 UpToDate, Inc. and its affiliates and/or licensors. All rights reserved.    Patient Education       Exercise and Aging   General   Exercise can help older adults prevent falls and stay independent longer. Exercise may also help:  You have stronger muscles and bones and better balance  You lose  weight and have more energy  Make your heart and lungs stronger  Lower your chance of health problems like heart disease, high blood sugar, or breast or colon cancer  Control conditions like high blood pressure and diabetes  You manage stress and get better sleep  Your mood, self-esteem, and self-image  How you think, plan, and pay attention  There are four types of exercise: endurance, strength, balance, and flexibility.  Endurance exercises get your heart rate up and keep it up for a while. Most people should get at least 30 minutes of exercise that gets your heart rate up on 5 or more days each week. Walking, swimming, biking, or going up and down stairs are kinds of exercises to get your heart rate up. You do not have to do all 30 minutes at one time. Try doing 10 minutes 3 times a day.  Strength exercises build muscle. Lifting weights or doing knee bends are kinds of strength exercises.  Balance exercises help to prevent falls. Raise up on your toes or stand on one leg as a kind of balance exercise.  Flexibility exercises move your joints through a full range of motion and stretch your muscles. Bend forward in a chair to stretch your back, do stretches, or bend and extend your arms or legs as a kind of flexibility exercise. Try doing 10 minutes twice a week.  Plan to include all these exercise types in your exercise program. Always check with your doctor before you start a new exercise program.  Some people do not like formal exercise classes, but there are many ways to work your muscles each day. Here are some things you can do.  Use steps instead of elevators.  Park far away in parking lots to walk farther.  Use the time while you wait. Do knee bends as you hold onto the kitchen counter while you wait for your coffee to brew.  When you unload groceries, lift the bags or a container of milk a few times to exercise your arms and legs.  Walk the long way when you go somewhere.  After you walk to the bathroom,  walk a few laps around the house before sitting down.  Try doing different standing exercises after you wash your hands each time in the bathroom.  Do balance exercises while you brush your teeth.  Do an exercise or get up and walk during TV commercials.  Don't forget to exercise small muscle groups like your hands, fingers, ankles, toes, and neck. Wiggle, bend, flex, and rotate these joints from left to right and back to front to help to keep these joints flexible.  When do I need to call the doctor?   Stop exercising and talk to your doctor if you have any of these problems:  Dizziness  Shortness of breath  Pain or pressure in the chest, arms, throat, jaw, or back  Nausea or vomiting  Blood clots  Infection  Joint swelling  Open wounds  Recent hip, back, or eye surgery  New problems that start during exercise  Helpful tips   If you have a health problem like heart disease or diabetes, talk with your doctor about the best exercises for you.  Always warm up before you stretch. Heated muscles stretch much easier than cool muscles. Try to walk or bike at an easy pace for a few minutes to warm up your muscles.  Slow your pace again after you exercise to cool down and bring your heart rate down slowly.  Be sure you do not hold your breath when you exercise because it can raise your blood pressure. If you tend to hold your breath, try to count out loud when you exercise.  Never bounce when doing stretches. Use slow and steady motions and hold your stretch for 20 to 30 seconds.  Have a routine. Doing exercises before a meal may be a good way to get into a routine.  Set small goals for yourself when you start to exercise. Use a chart to see how much you are doing. Ask someone to exercise with you.  Exercise may be slightly uncomfortable, but you should not have sharp pains. If you do get sharp pains, stop what you are doing. If the sharp pains continue, call your doctor.  Drink plenty of fluids without caffeine when you  exercise and afterwards. Avoid outdoor exercise if it is too cold or too hot.  Wear the right clothes and shoes. Try layers of clothes, so that you can take them off if you get too hot. Shoes should fit well and support your feet.  Where can I learn more?   National [place] on Aging  https://www.davey.nih.gov/health/publication/exercise-physical-activity/introduction   Last Reviewed Date   2021-03-18  Consumer Information Use and Disclaimer   This information is not specific medical advice and does not replace information you receive from your health care provider. This is only a brief summary of general information. It does NOT include all information about conditions, illnesses, injuries, tests, procedures, treatments, therapies, discharge instructions or life-style choices that may apply to you. You must talk with your health care provider for complete information about your health and treatment options. This information should not be used to decide whether or not to accept your health care providers advice, instructions or recommendations. Only your health care provider has the knowledge and training to provide advice that is right for you.  Copyright   Copyright © 2021 UpToDate, Inc. and its affiliates and/or licensors. All rights reserved.    Patient Education       Taking Opioids Safely   About this topic   When you have very bad pain, your doctor may order a strong drug known as an opioid (also known as a narcotic). Your doctor may have ordered an opioid to treat short-term pain like after a surgery or long-term pain like after an injury. There are also other kinds of opioids that are not legal and sold on the street. These are drugs like heroin.  Opioids act on parts of your brain to block pain. These drugs can also cause other reactions in your brain to slow your breathing, change your mood, and make it hard for you to think and make decisions. Most often, your doctor will want you to use an opioid pain  "drug only for a short time. There is a risk of needing more of the drug to get the same effects if you use the drug for a long time. This can put you more at risk for overdosing or taking too much of the drug. It is important to take the dose your doctor has prescribed for you. If your pain is not relieved by the prescribed dose, talk to your doctor.  Most drugs are safe when taken the right way. There is also the chance of harm when mistakes happen. Mistakes can happen at home, at the pharmacy, and in the hospital. A serious mistake could mean a visit to the ER or even cause death. The more you know about the drugs you are taking the better your chance of avoiding a very serious problem. Mistakes with drugs are also called drug errors or adverse drug events.  General   Know if you are at a high risk for side effects:   Some people are at a higher risk for having a problem when taking an opioid. You are more likely to have problems breathing or becoming too sleepy if you:  Are an older adult  Snore or have sleep apnea  Have recently had surgery, especially on your belly or chest  Have breathing problems or heart disease  Smoke  Are overweight or underweight  Are taking any other kind of drugs that may make you sleepy  Know your drugs:   Keep a list of all the drugs you take. This includes prescription and over-the-counter (OTC) drugs, natural products, and vitamins. Update your list when your drugs change. Show this list to all of your doctors.  Learn about your drug. Ask the doctor, "Am I taking a long acting drug or an extended release drug?" Take extra care if you are taking a long acting or extended release drug.  Make sure you know why you are taking the drug. Ask the doctor, "What does this drug treat?"  Make sure you know the right dose and when you should take the drug. Ask the doctor, "How much should I take? When do I need to take this drug?" Learn if this drug is only for very bad pain and what you should " take for mild pain.  Make sure you look at any color and markings on the drug. Ask at the pharmacy if the drugs look different after a refill.  Keep your drugs in the containers they came in. Do not mix drugs in the same container.  Take your drugs safely:   If you or someone in your house uses opioids, you might want to keep naloxone at home. You may be able to buy it at the drug store or your doctor can order it. This drug stops the effects of opioids and may help stop death from an opioid overdose. It comes as a nose spray or a shot that you can give to someone who has overdosed. Learn how and when to use it in case of an overdose.  Always read the label on the opioid drug container. Do this each time before you take a drug to be sure you have the right one and are taking it at the correct time.  Do not crush, chew, or break any pills or capsules unless your doctor tells you to. Do not cut drug patches unless your doctor or pharmacist says it is OK.  If you are taking a liquid drug, make sure to use the measuring device that came with the drug. Using other spoons or cups could cause an error in the amount of drug you are taking.  If you are in the hospital, make sure anyone who is giving you drugs checks your ID band first. This will keep you from getting someone else's drugs.  Take drugs only as directed. If you feel you need more of a drug to help your pain or need to take the drug sooner than ordered, call your doctor right away.  Opioids are habit forming. Take them only as needed.  Know how you react to your drugs. You may be unsteady on your feet and have problems walking. Do not drive or operate machinery while taking opioids.  Store your drugs safely:   Keep drugs that come in tubes, like cream or ointment, away from other products like toothpaste. This can help avoid a serious mistake.  Store drugs away from direct sunlight. Do not store drugs in places where there is lots of humidity like in a bathroom.  Your drugs may be less effective.  Do not store your drugs with other family members' drugs. Use separate areas or shelves to avoid mistakes.  Make sure opioids are kept in a locked or other secure place, away from visitors and out of reach of children and pets.     What are the causes?   You may be harmed if you:  Take a drug at the wrong time  Take too much of a drug  Take the wrong drug  Take drugs not prescribed for you  Take over-the-counter drugs and prescription drugs together  Have more than one prescription for drugs that treat the same thing  When do I need to call the doctor?   Signs of an overdose. These include very slow breathing, shallow breathing or no breathing, unable to awaken patient, slurred speech. Call for emergency help right away.  Signs of a very bad reaction. These include wheezing; chest tightness; fever; itching; bad cough; blue skin color; seizures; or swelling of face, lips, tongue, or throat. Call for emergency help right away.  If you feel you are having side effects from a drug. In the [place], you may also call the Food and Drug Administration at 4-867-LRA-9846 to report a severe drug reaction.  If you have started taking a new drug and notice changes in the way you feel, like feeling dizzy or confused  If you have questions about any of the drugs you take  You are not feeling better in 2 to 3 days or you are feeling worse  Helpful tips   Make sure all of your doctors know about every drug you are taking. Give them the list of your drugs. Ask if any new drugs will interact with your current drugs.  Be aware that your doctor may need to get a report on what drugs you take from a state reporting agency. You may also need to have a drug screen test before your doctor orders opioids or other drugs that can be misused or abused.  Let all doctors know about any drug allergies you have.  If you have a very bad allergy, wear an allergy ID at all times.  Never share your drugs. Never take anyone  else's drugs.  Talk with your doctor or pharmacist about how to get rid of extra opioids. Do not keep them lying around the house.  Read the drug package insert for more details.  When in doubt, ask questions before taking any drug.  Where can I learn more?   Family Doctor  [email or web address]/opioid-addiction   Family Doctor  https://familydoctor.org/safe-use-storage-and-disposal-[email or web address]-opioid-drugs/   Last Reviewed Date   2021-03-24  Consumer Information Use and Disclaimer   This information is not specific medical advice and does not replace information you receive from your health care provider. This is only a brief summary of general information. It does NOT include all information about conditions, illnesses, injuries, tests, procedures, treatments, therapies, discharge instructions or life-style choices that may apply to you. You must talk with your health care provider for complete information about your health and treatment options. This information should not be used to decide whether or not to accept your health care providers advice, instructions or recommendations. Only your health care provider has the knowledge and training to provide advice that is right for you.  Copyright   Copyright © 2021 [employer] and its affiliates and/or licensors. All rights reserved.    Safely and Effectively Managing Pain Without Opioids    Pain whether chronic or acute, can be devastating, and unfortunately it is a reality for many people. Effective pain management can reduce pain and help improve function so people can enjoy doing what matters to them most.     Making Decisions About How to Manage Your Pain  To find effective treatment options, talk to your doctor about managing your pain safely. A conversation with your doctor can help you understand nonopioid pain management options. Discuss  your health history,  how your activities have been impacted by pain, and  what you hope to gain from managing  your pain.  Having detailed discussions with your doctor about your pain management and function goals can help your doctor identify the best treatment with the lowest level of risk.    Know about the different types of pain:  Acute pain: usually starts suddenly and has a known cause, like an injury or surgery. It normally gets better as your body heals.  Chronic pain: lasts 3 months or more and can be caused by a disease or condition, injury, medical treatment, inflammation, or even an unknown reason.    Three ways you can help safely manage your pain:  Work with your doctor to make and follow a plan  Set realistic goals to return to an activity that pain prevents you from doing  Choose low risk pain relief options    Know Your Options for Pain Management Without Opioids  There are many options for pain management that do not include prescription opioids. Some options may work better and have fewer risks and side effects than opioids.    DEPENDING ON THE TYPE OF PAIN YOU ARE EXPERIENCING, THESE OPTIONS INCLUDE:  Acetaminophen (Tylenol®) or ibuprofen (Advil®)  Topical Ointments (for example lidocaine)  Exercise therapy, including physical therapy  Interventional therapies (injections)  Exercise and weight loss  Medications for depression or for seizures- some anti-depressants and anti-seizure medications have been shown to relieve chronic pain  Cognitive behavioral therapy - a psychological, goal-directed approach in which patients learn how to alter physical, behavioral, and emotional responses to pain and stress  Other therapies such as acupuncture and massage    Develop a Pain Management Plan with Your Doctor  Now that you know some of the options that may help you meet your pain management goals, work with your doctor to make and follow a pain management plan. Here are some things to remember when making a pain management plan:  Be open to managing pain without opioids  Be informed and know your options  Talk to  your doctor about which options may work for you; work with a specialist if needed  Follow up regularly with your doctor about your pain and whether your plan is working or not  Understand that it can take time to reduce your pain    For more information, visit Safely and Effectively Managing Pain Without Opioids  Feature Topics  Drug Overdose (cdc.gov)     Last Reviewed: June 11, 2021  Source: Centers for Disease Control and Prevention, National Center for Injury Prevention and Control

## 2025-01-29 NOTE — PROGRESS NOTES
"   Primary Care     Kimberly Agarwal is a 64 y.o. female here today for a Medicare Annual Wellness visit and comprehensive Health Risk Assessment. Reviewed and discussed labs.     Mammogram: 11/11/24. Normal. Repeat in 2025.   Colonoscopy:  4/27/2022. Repeat in 2027.   Bone Density: 6/1/2023. Normal Bone Density. Repeat in 2026.     Subjective   The following components were reviewed and updated:  Medical history  Family History  Social history  Allergies  Current Medications  Immunizations  Health Maintenance  Patient Care Team    Review of Systems  A comprehensive review of systems was conducted and is negative except as noted above.     Objective   Visit Vitals  /81 (BP Location: Right arm, Patient Position: Sitting)   Pulse 85   Temp 97.9 °F (36.6 °C)   Resp 20   Ht 5' 2" (1.575 m)   Wt (!) 139.3 kg (307 lb)   SpO2 96%   BMI 56.15 kg/m²        Physical Exam  Vitals and nursing note reviewed.   Constitutional:       General: She is not in acute distress.     Appearance: She is obese. She is not ill-appearing.   HENT:      Head: Normocephalic and atraumatic.      Mouth/Throat:      Mouth: Mucous membranes are moist.      Pharynx: Oropharynx is clear.   Eyes:      General: No scleral icterus.     Extraocular Movements: Extraocular movements intact.      Conjunctiva/sclera: Conjunctivae normal.      Pupils: Pupils are equal, round, and reactive to light.   Neck:      Vascular: No carotid bruit.   Cardiovascular:      Rate and Rhythm: Normal rate and regular rhythm.      Heart sounds: No murmur heard.     No friction rub. No gallop.   Pulmonary:      Effort: Pulmonary effort is normal. No respiratory distress.      Breath sounds: Normal breath sounds. No wheezing, rhonchi or rales.   Abdominal:      General: Abdomen is flat. Bowel sounds are normal. There is no distension.      Palpations: Abdomen is soft. There is no mass.      Tenderness: There is no abdominal tenderness.   Musculoskeletal:         General: " Normal range of motion.      Cervical back: Normal range of motion and neck supple.   Skin:     General: Skin is warm and dry.   Neurological:      General: No focal deficit present.      Mental Status: She is alert.   Psychiatric:         Mood and Affect: Mood normal.          Assessment/Plan:  1. Medicare annual wellness visit, subsequent  -     Hemoglobin A1C; Future; Expected date: 01/29/2025    2. Type 2 diabetes mellitus with other skin complication, without long-term current use of insulin  Assessment & Plan:  Lab Results   Component Value Date    HGBA1C 5.7 01/28/2025    HGBA1C 5.7 07/23/2024    LDL 80.00 01/28/2025    CREATININE 0.74 01/28/2025      The patient would benefit from a GLP-1 due to diabetes, obesity, sleep apnea, and chronic bilateral knee pain and back pain.  Start Ozempic 0.25 mg every 7 days.    Continue metformin 500 mg twice daily.    - Engaged in a conversation with the patient regarding potential risks of Ozempic including muscle atrophy.  - Encouraged developing an exercise regimen emphasizing weight-bearing activities to help maintain lean muscle mass.  - Emphasized the importance of hydration, recommending a daily intake of 73 to 100 ounces of water.  - Informed the patient about the possibility of experiencing headaches on injection days, reiterating the need for adequate hydration.  - Discussed dietary adjustments to mitigate nausea, advising smaller meal portions and the avoidance of fried, fatty foods, and high-sugar items.  - Discussed the risk of kidney damage resulting from dehydration.    -Mentioned the potential for pancreatitis.    - Warned about the risk of hypoglycemia or episodes of low blood sugar.    - Informed the patient about the possibility of a thyroid tumor and established a plan for self-examination, encouraging them to report any new nodules.               Orders:  -     Hemoglobin A1C; Future; Expected date: 01/29/2025  -     Diabetic Eye Screening Photo;  Future  -     Foot Exam Performed  -     Microalbumin/Creatinine Ratio, Urine; Future; Expected date: 01/29/2025  -     Urinalysis, Reflex to Urine Culture; Future; Expected date: 01/29/2025  -     semaglutide (OZEMPIC) 0.25 mg or 0.5 mg (2 mg/3 mL) pen injector; Inject 0.25 mg into the skin every 7 days.  Dispense: 1.5 mL; Refill: 0    3. Vitamin D deficiency  -     Vitamin D; Future; Expected date: 01/29/2025    4. Class 3 severe obesity due to excess calories with serious comorbidity and body mass index (BMI) of 50.0 to 59.9 in adult  Assessment & Plan:  Body mass index is 56.15 kg/m².    Discussed goals with the patient. At this time, we will focus on dietary modifications before exercising. Patient can do low impact exercises while sitting down.     Goal BMI <30.  Avoid soda, simple sugars, excessive rice, potatoes or bread. Limit fast foods and fried foods.  Choose complex carbs in moderation (example: green vegetables, beans, oatmeal). Eat plenty of fresh fruits and vegetables with lean meats daily.  Eat a balanced portion size.      5. Lack of physical activity    6. Discoloration of skin of lower leg  -     Cancel: CV Ultrasound doppler arterial legs bilat; Future  -     US Lower Extrem Arteries Bilat with VALENTIN (xpd); Future      A comprehensive HEALTH RISK ASSESSMENT was completed today. Results are summarized below:    There are NO EMOTIONAL/SOCIAL CONCERNS identified on today's screening for Social Isolation, Depression and Anxiety.    There are NO COGNITIVE FUNCTION CONCERNS identified on today's screening.  The following FUNCTIONAL AND/OR SAFETY CONCERNS were identified on today's screening for Physical Symptoms, Nutritional, Home Safety/Living Situation, Fall Risk, Activities of Daily Living, Independent Activities of Daily Living, Physical Activity,Timed Up and Go test and Whisper test::  *Patient reports NO ROUTINE EXERCISE. (On average, how many days per week do you engage in moderate to strenuous  exercise (like a brisk walk)?: (!) 0)      The patient has a PRESCRIPTION FOR OPIOIDS. The Opioid Risk Tool score was 0 which indicates LOW RISK for future opioid use disorder.    The patient is NOT A TOBACCO USER.        All Questions regarding food, transportation or housing were not answered today.    I provided Kimberly Agarwal with a 5-10 year written Screening Schedule per USPSTF age appropriate recommendations and a Personal Prevention Plan based on the results of today's Health Risk Assessment. Education, counseling, and referrals were provided as documented above and can be viewed in the After Visit Summary.    Follow up in about 19 days (around 2/17/2025) for DM II and Obesity Follow Up. In addition to this scheduled follow up, the patient has also been instructed to follow up on as needed basis.     Advance Care Planning     Date: 01/29/2025  Patient did not wish or was not able to name a surrogate decision maker or provide an Advance Care Plan.       Advance Care Planning   Today we discussed advance care planning. She is interested in learning more about how to make Advance Directives. Information was provided and I offered to discuss more at her discretion.

## 2025-01-29 NOTE — ASSESSMENT & PLAN NOTE
Body mass index is 56.15 kg/m².    Discussed goals with the patient. At this time, we will focus on dietary modifications before exercising. Patient can do low impact exercises while sitting down.     Goal BMI <30.  Avoid soda, simple sugars, excessive rice, potatoes or bread. Limit fast foods and fried foods.  Choose complex carbs in moderation (example: green vegetables, beans, oatmeal). Eat plenty of fresh fruits and vegetables with lean meats daily.  Eat a balanced portion size.

## 2025-01-29 NOTE — ASSESSMENT & PLAN NOTE
Lab Results   Component Value Date    HGBA1C 5.7 01/28/2025    HGBA1C 5.7 07/23/2024    LDL 80.00 01/28/2025    CREATININE 0.74 01/28/2025      The patient would benefit from a GLP-1 due to diabetes, obesity, sleep apnea, and chronic bilateral knee pain and back pain.  Start Ozempic 0.25 mg every 7 days.    Continue metformin 500 mg twice daily.    - Engaged in a conversation with the patient regarding potential risks of Ozempic including muscle atrophy.  - Encouraged developing an exercise regimen emphasizing weight-bearing activities to help maintain lean muscle mass.  - Emphasized the importance of hydration, recommending a daily intake of 73 to 100 ounces of water.  - Informed the patient about the possibility of experiencing headaches on injection days, reiterating the need for adequate hydration.  - Discussed dietary adjustments to mitigate nausea, advising smaller meal portions and the avoidance of fried, fatty foods, and high-sugar items.  - Discussed the risk of kidney damage resulting from dehydration.    -Mentioned the potential for pancreatitis.    - Warned about the risk of hypoglycemia or episodes of low blood sugar.    - Informed the patient about the possibility of a thyroid tumor and established a plan for self-examination, encouraging them to report any new nodules.

## 2025-01-30 ENCOUNTER — TELEPHONE (OUTPATIENT)
Dept: FAMILY MEDICINE | Facility: CLINIC | Age: 65
End: 2025-01-30
Payer: MEDICARE

## 2025-01-30 NOTE — TELEPHONE ENCOUNTER
----- Message from Rosana Rojas NP sent at 1/29/2025  4:51 PM CST -----  Diabetic Eye exam shows no diabetic retinopathy or macular edema. Recommended yearly eye exam. Diabetic eye exam in 12 months.

## 2025-02-03 ENCOUNTER — HOSPITAL ENCOUNTER (OUTPATIENT)
Dept: RADIOLOGY | Facility: HOSPITAL | Age: 65
Discharge: HOME | End: 2025-02-03
Payer: MEDICARE

## 2025-02-03 DIAGNOSIS — L81.9 DISCOLORATION OF SKIN OF LOWER LEG: ICD-10-CM

## 2025-02-03 PROCEDURE — 93925 LOWER EXTREMITY STUDY: CPT | Mod: TC

## 2025-02-04 ENCOUNTER — TELEPHONE (OUTPATIENT)
Dept: FAMILY MEDICINE | Facility: CLINIC | Age: 65
End: 2025-02-04
Payer: MEDICARE

## 2025-02-04 NOTE — TELEPHONE ENCOUNTER
----- Message from Rosana Rojas NP sent at 2/3/2025  3:59 PM CST -----  US Lower extremity negative for right lower extremity high-grade arterial stenosis or occlusion.    I don't see anything for the left lower extremity?

## 2025-02-04 NOTE — TELEPHONE ENCOUNTER
----- Message from Rosana Rojas NP sent at 2/4/2025 10:18 AM CST -----  Please inform patient of lab results.     1.Exam is negative for bilateral lower extremity high-grade arterial stenosis or occlusion.    Thanks for all you do,   Rosana

## 2025-02-10 ENCOUNTER — PATIENT OUTREACH (OUTPATIENT)
Facility: CLINIC | Age: 65
End: 2025-02-10
Payer: MEDICARE

## 2025-02-10 NOTE — PROGRESS NOTES
Population Health Outreach.    Outside Immunizations Updated.     DUE   Urine Screening:    DM Urine ordered         RSV Vaccine           Last Primary Care Visit Date: 1/29/2025   Next Primary Care Visit Date: 2/18/2025

## 2025-02-18 ENCOUNTER — OFFICE VISIT (OUTPATIENT)
Dept: FAMILY MEDICINE | Facility: CLINIC | Age: 65
End: 2025-02-18
Payer: MEDICARE

## 2025-02-18 VITALS
BODY MASS INDEX: 53.92 KG/M2 | OXYGEN SATURATION: 96 % | SYSTOLIC BLOOD PRESSURE: 145 MMHG | RESPIRATION RATE: 20 BRPM | DIASTOLIC BLOOD PRESSURE: 82 MMHG | WEIGHT: 293 LBS | HEIGHT: 62 IN | HEART RATE: 82 BPM | TEMPERATURE: 98 F

## 2025-02-18 DIAGNOSIS — I10 ESSENTIAL HYPERTENSION: ICD-10-CM

## 2025-02-18 DIAGNOSIS — M25.511 CHRONIC RIGHT SHOULDER PAIN: ICD-10-CM

## 2025-02-18 DIAGNOSIS — G89.4 CHRONIC PAIN SYNDROME: ICD-10-CM

## 2025-02-18 DIAGNOSIS — E66.813 CLASS 3 SEVERE OBESITY DUE TO EXCESS CALORIES WITH SERIOUS COMORBIDITY AND BODY MASS INDEX (BMI) OF 50.0 TO 59.9 IN ADULT: Chronic | ICD-10-CM

## 2025-02-18 DIAGNOSIS — E78.2 MIXED HYPERLIPIDEMIA: Chronic | ICD-10-CM

## 2025-02-18 DIAGNOSIS — E11.628 TYPE 2 DIABETES MELLITUS WITH OTHER SKIN COMPLICATION, WITHOUT LONG-TERM CURRENT USE OF INSULIN: Primary | ICD-10-CM

## 2025-02-18 DIAGNOSIS — F32.A DEPRESSIVE DISORDER: ICD-10-CM

## 2025-02-18 DIAGNOSIS — G89.29 CHRONIC RIGHT SHOULDER PAIN: ICD-10-CM

## 2025-02-18 DIAGNOSIS — E66.01 CLASS 3 SEVERE OBESITY DUE TO EXCESS CALORIES WITH SERIOUS COMORBIDITY AND BODY MASS INDEX (BMI) OF 50.0 TO 59.9 IN ADULT: Chronic | ICD-10-CM

## 2025-02-18 RX ORDER — SEMAGLUTIDE 0.68 MG/ML
0.5 INJECTION, SOLUTION SUBCUTANEOUS
Qty: 3 ML | Refills: 2 | Status: SHIPPED | OUTPATIENT
Start: 2025-02-18 | End: 2025-05-19

## 2025-02-18 RX ORDER — DULOXETIN HYDROCHLORIDE 60 MG/1
60 CAPSULE, DELAYED RELEASE ORAL EVERY MORNING
Qty: 90 CAPSULE | Refills: 1 | Status: SHIPPED | OUTPATIENT
Start: 2025-02-18 | End: 2025-08-17

## 2025-02-18 NOTE — PROGRESS NOTES
Patient ID: [id number]     Chief Complaint: Diabetes (4 week follow up), Headache, Arm Pain (Right arm pain with no improvement since last visit), and Cough (Post nasal drip)      HPI:     Kimberly Agarwal is a 64 year old female who presents today for four-week follow-up after starting Ozempic for diabetes management.     WEIGHT MANAGEMENT:  She has completed three Ozempic injections with a reported weight loss of 8 lbs, decreasing from 307 to 299 lbs. She reports significant appetite reduction with the medication.    MUSCULOSKELETAL:  She reports right shoulder pain after sliding out of bed, with pain occurring when lifting her arm at a certain point. Patient reports being told pain of shoulder is originating from her cervical spine. X-ray of the right shoulder from last year showed no acute fracture. She also reports bone-on-bone knee pain with difficulty rising from kneeling position, requiring approximately 20 minutes to stand using assistive devices. She received knee injection yesterday.    Past Medical History:   Diagnosis Date    Achilles tendinitis, left leg     Astigmatism     Bursitis of right shoulder     Chronic back pain     Chronic pain     GERD (gastroesophageal reflux disease)     Hepatitis a without hepatic coma     Herpes labialis     High cholesterol     HLD (hyperlipidemia)     HTN (hypertension)     Hyperopia     Hypokalemia     Hypokalemia 10/26/2018    Lower extremity edema     OAB (overactive bladder)     Personal history of colonic polyps 04/27/2022    s/p polypectomy - Dr Matheus Alba    Presbyopia     RLS (restless legs syndrome)     Sciatica     Sleep apnea     CPAP    Sleep disorder     Spondylosis of lumbosacral spine with radiculopathy     Type 2 diabetes mellitus with unspecified diabetic retinopathy without macular edema     Urinary incontinence, mixed     Vitamin D deficiency     Wound of right leg 10/27/2022        Past Surgical History:   Procedure Laterality Date     APPENDECTOMY      BLOCK, NERVE, GENICULAR Bilateral 2023    Dr Phillip Clement    COLONOSCOPY W/ BIOPSIES AND POLYPECTOMY  2022    Dr Matheus Alba    COLONOSCOPY W/ BIOPSIES AND POLYPECTOMY  2019    Dr Matheus Alba    EPIDURAL STEROID INJECTION INTO LUMBAR SPINE      Dr Lorelei Schmidt    ESOPHAGOGASTRODUODENOSCOPY  2022    Dr Matheus Alba    ESOPHAGOGASTRODUODENOSCOPY  2019    Dr Matheus Alba    LIVER BIOPSY      LUMBAR FUSION  2010    Fused L4-5    OPEN REDUCTION AND INTERNAL FIXATION (ORIF) OF INJURY OF WRIST Right     RADIOFREQUENCY ABLATION OF LUMBAR MEDIAL BRANCH NERVE AT SINGLE LEVEL Bilateral 2022    Dr Phillip Clement MD    RADIOFREQUENCY ABLATION OF LUMBAR MEDIAL BRANCH NERVE AT SINGLE LEVEL Bilateral 2023    Procedure: RADIOFREQUENCY ABLATION, NERVE, SPINAL, LUMBAR, MEDIAL BRANCH, 1 LEVEL (Bilateral L3-5 RFA) *Latex Allergy*;  Surgeon: Phillip Clement MD;  Location: [facility];  Service: Pain Management;  Laterality: Bilateral;    SPINE SURGERY      TONSILLECTOMY      TOTAL KNEE ARTHROPLASTY Left         Social History     Socioeconomic History    Marital status:    Tobacco Use    Smoking status: Former     Current packs/day: 0.00     Average packs/day: 0.3 packs/day for 5.0 years (1.3 ttl pk-yrs)     Types: Cigarettes     Quit date: 2024     Years since quittin.0    Smokeless tobacco: Never   Substance and Sexual Activity    Alcohol use: Not Currently    Drug use: Never    Sexual activity: Not Currently     Partners: Male     Social Drivers of Health     Financial Resource Strain: Low Risk  (2023)    Overall Financial Resource Strain (CARDIA)     Difficulty of Paying Living Expenses: Not hard at all   Food Insecurity: No Food Insecurity (2023)    Hunger Vital Sign     Worried About Running Out of Food in the Last Year: Never true     Ran Out of Food in the Last Year: Never true   Transportation Needs: No Transportation Needs (2023)    PRAPARE -  Transportation     Lack of Transportation (Medical): No     Lack of Transportation (Non-Medical): No   Physical Activity: Inactive (5/19/2023)    Exercise Vital Sign     Days of Exercise per Week: 0 days     Minutes of Exercise per Session: 0 min   Stress: Stress Concern Present (5/19/2023)    [facility] - Occupational Stress Questionnaire     Feeling of Stress : To some extent   Housing Stability: Low Risk  (5/19/2023)    Housing Stability Vital Sign     Unable to Pay for Housing in the Last Year: No     Number of Places Lived in the Last Year: 1     Unstable Housing in the Last Year: No        Current Outpatient Medications   Medication Instructions    betamethasone valerate 0.1% (VALISONE) 0.1 % Crea APPLY 1 APPLICATION TOPICALLY 2 (TWO) TIMES A DAY.    calcium carbonate/vitamin D3 (CALTRATE 600 PLUS D ORAL) 1 tablet, 2 times daily    cetirizine (ZYRTEC) 10 mg, Nightly    cyclobenzaprine (FLEXERIL) 10 mg, for muscle spasm    DULoxetine (CYMBALTA) 60 mg, Oral, Every morning    esomeprazole (NEXIUM) 40 mg, Oral, Before breakfast    furosemide (LASIX) 40 mg, Oral, 2 times daily    HYDROcodone-acetaminophen (NORCO) 7.5-325 mg per tablet 1 tablet, 2 times daily PRN    losartan (COZAAR) 25 mg, Oral, Every morning    losartan-hydrochlorothiazide 50-12.5 mg (HYZAAR) 50-12.5 mg per tablet 1 tablet, Oral, Every morning    lutein 40 mg Cap 1 capsule, Every morning    metFORMIN (GLUCOPHAGE) 500 mg, Oral, 2 times daily with meals    metroNIDAZOLE (METROGEL) 0.75 % gel Topical (Top), 2 times daily    mirabegron (MYRBETRIQ) 50 mg, Oral, Daily    oxybutynin (DITROPAN-XL) 5 mg, Oral, Daily    OZEMPIC 0.5 mg, Subcutaneous, Every 7 days    polyethylene glycol (GLYCOLAX) 17 g, Nightly    potassium chloride SA (K-DUR,KLOR-CON) 20 MEQ tablet TAKE 1 TABLET TWICE DAILY    pramipexole (MIRAPEX) 0.125 mg, Oral, Nightly    pregabalin (LYRICA) 150 mg, 2 times daily    simvastatin (ZOCOR) 20 mg, Oral, Nightly     "TRUE METRIX GLUCOSE TEST STRIP Strp CHECK BLOOD SUGAR ONE TIME DAILY    TRUEPLUS LANCETS 30 gauge Misc CHECK BLOOD SUGAR ONE TIME DAILY    valACYclovir (VALTREX) 500 mg, Daily PRN       Review of patient's allergies indicates:   Allergen Reactions    Latex Rash    Adhesive tape-silicones Blisters    Ciprofloxacin         Patient Care Team:  Kai Back DO as PCP - General (Family Medicine)  Phillip Clement MD as Consulting Physician (Pain Medicine)  Matheus Alba MD as Consulting Physician (Gastroenterology)  Lorelei Schmidt MD as Anesthesiologist (Interventional Pain Medicine)  Yeison Leal MD (Orthopedic Surgery)  [place] ([facility])  Chapo Steele MD (Obstetrics and Gynecology)  Kirit De OD (Optometry)  Taisha Leyva LPN as Care Coordinator     Subjective:     Review of Systems    12 point review of systems conducted, negative except as stated in the history of present illness. See HPI for details.    Objective:     Visit Vitals  BP (!) 145/82 (BP Location: Left arm, Patient Position: Sitting)   Pulse 82   Temp 97.9 °F (36.6 °C)   Resp 20   Ht 5' 2" (1.575 m)   Wt 135.9 kg (299 lb 9.6 oz)   SpO2 96%   BMI 54.80 kg/m²       Physical Exam  Vitals and nursing note reviewed.   Constitutional:       General: She is not in acute distress.     Appearance: She is obese. She is not ill-appearing.   HENT:      Head: Normocephalic and atraumatic.      Mouth/Throat:      Mouth: Mucous membranes are moist.      Pharynx: Oropharynx is clear.   Eyes:      General: No scleral icterus.     Extraocular Movements: Extraocular movements intact.      Conjunctiva/sclera: Conjunctivae normal.      Pupils: Pupils are equal, round, and reactive to light.   Neck:      Vascular: No carotid bruit.   Cardiovascular:      Rate and Rhythm: Normal rate and regular rhythm.      Heart sounds: No murmur heard.     No friction rub. No gallop.   Pulmonary:      Effort: " Pulmonary effort is normal. No respiratory distress.      Breath sounds: Normal breath sounds. No wheezing, rhonchi or rales.   Abdominal:      General: Abdomen is flat. Bowel sounds are normal. There is no distension.      Palpations: Abdomen is soft. There is no mass.      Tenderness: There is no abdominal tenderness.   Musculoskeletal:      Right shoulder: Tenderness present. Decreased range of motion. Decreased strength. Normal pulse.      Left shoulder: Normal.      Cervical back: Normal range of motion and neck supple.   Skin:     General: Skin is warm and dry.   Neurological:      General: No focal deficit present.      Mental Status: She is alert.   Psychiatric:         Mood and Affect: Mood normal.       Labs Reviewed:     Chemistry:  Lab Results   Component Value Date     (H) 01/28/2025    K 4.0 01/28/2025    BUN 15.0 01/28/2025    CREATININE 0.74 01/28/2025    EGFRNORACEVR >60 01/28/2025    GLUCOSE 102 01/28/2025    CALCIUM 9.7 01/28/2025    ALKPHOS 79 01/28/2025    LABPROT 7.1 01/28/2025    ALBUMIN 3.8 01/28/2025    BILIDIR 0.3 11/01/2021    IBILI 0.30 11/01/2021    AST 13 01/28/2025    ALT 12 01/28/2025    [id number] 63.7 01/18/2024    TSH 0.632 01/18/2024        Lab Results   Component Value Date    HGBA1C 5.7 01/28/2025        Hematology:  Lab Results   Component Value Date    WBC 5.84 01/28/2025    HGB 13.0 01/28/2025    HCT 42.0 01/28/2025     01/28/2025       Lipid Panel:  Lab Results   Component Value Date    CHOL 160 01/28/2025    HDL 42 01/28/2025    LDL 80.00 01/28/2025    TRIG 191 (H) 01/28/2025    TOTALCHOLEST 4 01/28/2025        Urine:  Lab Results   Component Value Date    CREATRANDUR 64.0 01/18/2024     Assessment:       ICD-10-CM ICD-9-CM   1. Type 2 diabetes mellitus with other skin complication, without long-term current use of insulin  E11.628 250.80   2. Class 3 severe obesity due to excess calories with serious comorbidity and body mass index (BMI) of 50.0 to 59.9 in  adult  E66.813 278.01    Z68.43 V85.43    E66.01    3. Essential hypertension  I10 401.9   4. Chronic right shoulder pain  M25.511 719.41    G89.29 338.29   5. Mixed hyperlipidemia  E78.2 272.2   6. Depressive disorder  F32.A 311   7. Chronic pain syndrome  G89.4 338.4     Plan:     1. Type 2 diabetes mellitus with other skin complication, without long-term current use of insulin  Assessment & Plan:  Increased Ozempic to 0.5 mg every 7 days for the next 90 days.  Continued Metformin for diabetes management.  Scheduled follow up in 3 months (around May) for A1C recheck.    Orders:  -     semaglutide (OZEMPIC) 0.25 mg or 0.5 mg (2 mg/3 mL) pen injector; Inject 0.5 mg into the skin every 7 days.  Dispense: 3 mL; Refill: 2  -     Hemoglobin A1C; Future; Expected date: 05/05/2025  -     Comprehensive Metabolic Panel; Future; Expected date: 05/05/2025  -     Lipid Panel; Future; Expected date: 05/05/2025    2. Class 3 severe obesity due to excess calories with serious comorbidity and body mass index (BMI) of 50.0 to 59.9 in adult  Assessment & Plan:  Body mass index is 54.8 kg/m².    Educated on importance of maintaining muscle mass during weight loss.  Explained benefits of potatoes and sweet potatoes as good sources of vitamins.  Kimberly to start exercising as weight loss progresses.  Recommend low-impact exercises, including use of home bike with wheel for upper body workout.  Kimberly to eat in moderation and increase protein and vegetable intake.       3. Essential hypertension  Assessment & Plan:  Observed that the patient's blood pressure is slightly elevated today.  She is in pain from her shoulder and also has a headache.     Monitor blood pressure daily and log. Report consistent numbers greater than 140/90.        4. Chronic right shoulder pain  -     MRI Shoulder Without Contrast Right; Future; Expected date: 02/18/2025    5. Mixed hyperlipidemia  -     Comprehensive Metabolic Panel; Future; Expected date:  05/05/2025  -     Lipid Panel; Future; Expected date: 05/05/2025    6. Depressive disorder  -     DULoxetine (CYMBALTA) 60 MG capsule; Take 1 capsule (60 mg total) by mouth every morning.  Dispense: 90 capsule; Refill: 1    7. Chronic pain syndrome  -     DULoxetine (CYMBALTA) 60 MG capsule; Take 1 capsule (60 mg total) by mouth every morning.  Dispense: 90 capsule; Refill: 1      Follow up in about 3 months (around 5/18/2025) for DM II F/U with Rosana . In addition to their scheduled follow up, the patient has also been instructed to follow up on as needed basis.     This note was generated with the assistance of ambient listening technology. Verbal consent was obtained by the patient and accompanying visitor(s) for the recording of patient appointment to facilitate this note. I attest to having reviewed and edited the generated note for accuracy, though some syntax or spelling errors may persist. Please contact the author of this note for any clarification.    Rosana Rojas NP

## 2025-02-18 NOTE — ASSESSMENT & PLAN NOTE
Increased Ozempic to 0.5 mg every 7 days for the next 90 days.  Continued Metformin for diabetes management.  Scheduled follow up in 3 months (around May) for A1C recheck.

## 2025-02-18 NOTE — ASSESSMENT & PLAN NOTE
Body mass index is 54.8 kg/m².    Educated on importance of maintaining muscle mass during weight loss.  Explained benefits of potatoes and sweet potatoes as good sources of vitamins.  Kimberly to start exercising as weight loss progresses.  Recommend low-impact exercises, including use of home bike with wheel for upper body workout.  Kimberly to eat in moderation and increase protein and vegetable intake.

## 2025-02-25 ENCOUNTER — HOSPITAL ENCOUNTER (OUTPATIENT)
Dept: RADIOLOGY | Facility: HOSPITAL | Age: 65
Discharge: HOME | End: 2025-02-25
Payer: MEDICARE

## 2025-02-25 ENCOUNTER — RESULTS FOLLOW-UP (OUTPATIENT)
Dept: FAMILY MEDICINE | Facility: CLINIC | Age: 65
End: 2025-02-25
Payer: MEDICARE

## 2025-02-25 DIAGNOSIS — M25.511 CHRONIC RIGHT SHOULDER PAIN: ICD-10-CM

## 2025-02-25 DIAGNOSIS — S46.001A INJURY OF RIGHT ROTATOR CUFF, INITIAL ENCOUNTER: Primary | ICD-10-CM

## 2025-02-25 DIAGNOSIS — G89.29 CHRONIC RIGHT SHOULDER PAIN: ICD-10-CM

## 2025-02-25 PROCEDURE — 73221 MRI JOINT UPR EXTREM W/O DYE: CPT | Mod: TC,RT

## 2025-02-26 ENCOUNTER — HOSPITAL ENCOUNTER (OUTPATIENT)
Dept: RADIOLOGY | Facility: CLINIC | Age: 65
Discharge: HOME | End: 2025-02-26
Attending: REHABILITATION UNIT
Payer: MEDICARE

## 2025-02-26 ENCOUNTER — OFFICE VISIT (OUTPATIENT)
Dept: ORTHOPEDICS | Facility: CLINIC | Age: 65
End: 2025-02-26
Payer: MEDICARE

## 2025-02-26 VITALS
DIASTOLIC BLOOD PRESSURE: 76 MMHG | HEIGHT: 62 IN | WEIGHT: 293 LBS | HEART RATE: 82 BPM | SYSTOLIC BLOOD PRESSURE: 145 MMHG | BODY MASS INDEX: 53.92 KG/M2

## 2025-02-26 DIAGNOSIS — M25.511 RIGHT SHOULDER PAIN, UNSPECIFIED CHRONICITY: Primary | ICD-10-CM

## 2025-02-26 DIAGNOSIS — M25.511 RIGHT SHOULDER PAIN, UNSPECIFIED CHRONICITY: ICD-10-CM

## 2025-02-26 DIAGNOSIS — S46.001A INJURY OF RIGHT ROTATOR CUFF, INITIAL ENCOUNTER: ICD-10-CM

## 2025-02-26 PROCEDURE — 3008F BODY MASS INDEX DOCD: CPT | Mod: CPTII,,, | Performed by: REHABILITATION UNIT

## 2025-02-26 PROCEDURE — 73030 X-RAY EXAM OF SHOULDER: CPT | Mod: RT,,, | Performed by: REHABILITATION UNIT

## 2025-02-26 PROCEDURE — 4010F ACE/ARB THERAPY RXD/TAKEN: CPT | Mod: CPTII,,, | Performed by: REHABILITATION UNIT

## 2025-02-26 PROCEDURE — 1101F PT FALLS ASSESS-DOCD LE1/YR: CPT | Mod: CPTII,,, | Performed by: REHABILITATION UNIT

## 2025-02-26 PROCEDURE — 3078F DIAST BP <80 MM HG: CPT | Mod: CPTII,,, | Performed by: REHABILITATION UNIT

## 2025-02-26 PROCEDURE — 3044F HG A1C LEVEL LT 7.0%: CPT | Mod: CPTII,,, | Performed by: REHABILITATION UNIT

## 2025-02-26 PROCEDURE — 20610 DRAIN/INJ JOINT/BURSA W/O US: CPT | Mod: RT,,, | Performed by: REHABILITATION UNIT

## 2025-02-26 PROCEDURE — 3288F FALL RISK ASSESSMENT DOCD: CPT | Mod: CPTII,,, | Performed by: REHABILITATION UNIT

## 2025-02-26 PROCEDURE — 99204 OFFICE O/P NEW MOD 45 MIN: CPT | Mod: 25,,, | Performed by: REHABILITATION UNIT

## 2025-02-26 PROCEDURE — 3077F SYST BP >= 140 MM HG: CPT | Mod: CPTII,,, | Performed by: REHABILITATION UNIT

## 2025-02-26 PROCEDURE — 1159F MED LIST DOCD IN RCRD: CPT | Mod: CPTII,,, | Performed by: REHABILITATION UNIT

## 2025-02-26 RX ADMIN — BETAMETHASONE SODIUM PHOSPHATE AND BETAMETHASONE ACETATE 6 MG: 3; 3 INJECTION, SUSPENSION INTRA-ARTICULAR; INTRALESIONAL; INTRAMUSCULAR; SOFT TISSUE at 11:02

## 2025-02-26 RX ADMIN — LIDOCAINE HYDROCHLORIDE 3 ML: 10 INJECTION, SOLUTION INFILTRATION; PERINEURAL at 11:02

## 2025-02-26 NOTE — TELEPHONE ENCOUNTER
----- Message from Rosana Rojas NP sent at 2/25/2025  3:49 PM CST -----  Please inform patient of lab results.     1.A full-thickness tear is present to the distal supraspinatus tendon with torn fibers retracted to the level of the glenohumeral joint line.   An intermediate grade partial-thickness articular surface tear is present to the distal infraspinatus tendon.     Mild associated atrophy is present to the supraspinatus muscle bell     Will refer to ortho for consult for rotator cuff injury.     Thanks for all you do,   Rosana   ----- Message -----  From: Goldy, Rad Results In  Sent: 2/25/2025   3:38 PM CST  To: Rosana Rojas NP

## 2025-02-26 NOTE — PROGRESS NOTES
Subjective:      Patient ID: Kimberly Agarwal is a 64 y.o. female.    Chief Complaint: Pain of the Right Shoulder (MRI done 2/25/25, x-ray done 4/17/24, referred by Rosana Rojas NP - Stated slipped out of bed about 2 weeks ago. Was given an injection in neck about 3 weeks which helped some pain but fall made it worse. ROM is very limited due to pain. Pain radiates from the shoulder down into forearm. Is taking tylenol in the morning and then norco in afternoon but has not helped with the pain. Denies any numbness or tingling. )    HPI:   Kimberly Agarwal is a 64 y.o. female who presents today for initial evaluation of her R shoulder    History of Present Illness    CHIEF COMPLAINT:  - Right shoulder pain    HPI:  Kimberly presents with right shoulder pain persisting for about six months, with significant exacerbation a few weeks ago following a fall. The pain severely limits right arm functionality, making it difficult to perform daily activities such as getting back into bed. Kimberly believes the shoulder might have been previously injured but was worsened by the recent fall. She has a history of multiple falls, which could have contributed to the injury.    Right arm range of motion is limited. Kimberly can only partially raise her arm. She reports needing to use her right arm despite difficulties, mentioning issues with tripping and falling.    Approximately 3-4 weeks ago, the patient received a steroid injection in her neck from pain management, which provided some relief for overall pain. She mentions engaging in light household activities such as washing dishes and feeding chickens, but avoids straining activities.    An MRI performed a few days prior to this visit revealed a large rotator cuff tear that has been present for some time, as evidenced by muscle atrophy. Kimberly reports occasional catching sensations in the shoulder.    Kimberly denies any recent shoulder injections.    PREVIOUS  "TREATMENTS:  - Corticosteroid injection in the neck: 3-4 weeks ago, provided some benefit    IMAGING:  - X-rays of the right shoulder: 2025, show mild arthritis at the AC joint and within the shoulder itself. Overall, bony structures appear okay.  - MRI of the right shoulder: 2025, reveals a large, chronic rotator cuff tear. The tendon is pulled back and detached from the bone. There is also evidence of muscle atrophy, indicating the tear has been present for some time.    SURGICAL HISTORY:  - Knee replacement    WORK STATUS:  - Kimberly helps her brother with household chores:  - Washes dishes  - Feeds chickens  - Performs "inside stuff"  - Experiences difficulty with certain movements and activities:  - Cannot get on her knees  - Has trouble reaching for items on high shelves  - Experiences pain and weakness when lifting her arm      ROS:  Musculoskeletal: +joint pain, -muscle pain, +muscle weakness          Past Medical History:   Diagnosis Date    Achilles tendinitis, left leg     Astigmatism     Bursitis of right shoulder     Chronic back pain     Chronic pain     GERD (gastroesophageal reflux disease)     Hepatitis a without hepatic coma     Herpes labialis     High cholesterol     HLD (hyperlipidemia)     HTN (hypertension)     Hyperopia     Hypokalemia     Hypokalemia 10/26/2018    Lower extremity edema     OAB (overactive bladder)     Personal history of colonic polyps 04/27/2022    s/p polypectomy - Dr Matheus Alba    Presbyopia     RLS (restless legs syndrome)     Sciatica     Sleep apnea     CPAP    Sleep disorder     Spondylosis of lumbosacral spine with radiculopathy     Type 2 diabetes mellitus with unspecified diabetic retinopathy without macular edema     Urinary incontinence, mixed     Vitamin D deficiency     Wound of right leg 10/27/2022     Past Surgical History:   Procedure Laterality Date    APPENDECTOMY      BLOCK, NERVE, GENICULAR Bilateral 05/05/2023    Dr Phillip Clement    COLONOSCOPY W/ " "BIOPSIES AND POLYPECTOMY  04/27/2022    Dr Matheus Alba    COLONOSCOPY W/ BIOPSIES AND POLYPECTOMY  12/09/2019    Dr Matheus Alba    EPIDURAL STEROID INJECTION INTO LUMBAR SPINE      Dr Lorelei Schmidt    ESOPHAGOGASTRODUODENOSCOPY  04/27/2022    Dr Matheus Alba    ESOPHAGOGASTRODUODENOSCOPY  12/09/2019    Dr Matheus Alba    LIVER BIOPSY      LUMBAR FUSION  2010    Fused L4-5    OPEN REDUCTION AND INTERNAL FIXATION (ORIF) OF INJURY OF WRIST Right     RADIOFREQUENCY ABLATION OF LUMBAR MEDIAL BRANCH NERVE AT SINGLE LEVEL Bilateral 09/09/2022    Dr Phillip Clement MD    RADIOFREQUENCY ABLATION OF LUMBAR MEDIAL BRANCH NERVE AT SINGLE LEVEL Bilateral 9/8/2023    Procedure: RADIOFREQUENCY ABLATION, NERVE, SPINAL, LUMBAR, MEDIAL BRANCH, 1 LEVEL (Bilateral L3-5 RFA) *Latex Allergy*;  Surgeon: Phillip Clement MD;  Location: [facility];  Service: Pain Management;  Laterality: Bilateral;    SPINE SURGERY      TONSILLECTOMY      TOTAL KNEE ARTHROPLASTY Left 2020     Social History[1]    Current Medications[2]  Review of patient's allergies indicates:   Allergen Reactions    Latex Rash    Adhesive tape-silicones Blisters    Ciprofloxacin        BP (!) 145/76   Pulse 82   Ht 5' 2" (1.575 m)   Wt 133.4 kg (294 lb)   BMI 53.77 kg/m²     Comprehensive review of systems completed and negative except as per HPI.        Objective:   Head: Normocephalic, without obvious abnormality, atraumatic  Eyes: conjunctivae/corneas clear. EOM's intact  Ears: normal external appearance  Nose: Nares normal. Septum midline. Mucosa normal. No drainage  Throat: normal findings: lips normal without lesions  Lungs: unlabored breathing on room air  Chest wall: symmetric chest rise  Heart: regular rate and rhythm  Pulses: 2+ and symmetric  Skin: Skin color, texture, turgor normal. No rashes or lesions  Neurologic: Grossly normal    right SHOULDER    Appearance:   normal    Cervical Spine:   Reduced motion     Tenderness:   diffuse    AROM:   FF 85, Abduction 75, ER " 60, IR pocket    PROM:  same    Pain:  AROM: Positive  PROM:  Positive  End ROM: Positive  Supraspinatus strength testing: Positive  External rotation strength testing: Positive  Ora-scapular: Positive  Virtually all provacative maneuvers Positive    Strength:  Supraspinatus: reduced  External rotation: reduced    Provocative Maneuvers:     Rotator Cuff/Biceps/AC Joint  Neer's Sign: Positive  Hawkin's Test: Positive  Painful arc: Positive  Belly Press: Negative  Bear Hugger Test: Negative  Hornblower's Sign: Negative  Speed's Test: Positive  Yergeson's Test: Positive  Cross Arm Abduction: Positive    Pulses: Palpable radial pulse    Neurological deficits: None    The patient has a warm and well-perfused upper extremity with capillary refill less than 2 seconds. Sensation is intact to light touch in terminal nerve distributions. 5/5 ain/pin/uln. The patient has no palpable epitrochlear lymphadenopathy.      Assessment:     Imaging:   Four view radiographs of the right shoulder obtained today personally reviewed showing no acute fractures or dislocations.  Advanced AC OA. Mild GH OA.  Humeral head remains seated centrally within the glenoid.    MRI Shoulder Without Contrast Right  Narrative: EXAMINATION:  MRI SHOULDER WITHOUT CONTRAST RIGHT    CLINICAL HISTORY:  Shoulder pain, rotator cuff disorder suspected, xray done;  Pain in right shoulder    TECHNIQUE:  Multiplanar multislice images are were performed through the right shoulder.    COMPARISON:  Radiographs right shoulder used for comparison dated 04/17/2024    FINDINGS:  A full-thickness tear is present to the distal supraspinatus tendon with torn fibers retracted to the level of the glenohumeral joint line.  An intermediate grade partial-thickness articular surface tear is present to the distal infraspinatus tendon.  Subscapularis and teres minor tendons are intact.    The glenoid labrum is intact.  Articular cartilage is intact.  The long head biceps tendon is  intact.  No thickening of the joint capsule.    No os acromial.  Coracoclavicular ligaments are intact.  Mild atrophy is present to the supraspinatus muscle belly.  The included portion of the pectoralis major is intact.  Impression: A full-thickness tear is present to the distal supraspinatus tendon with torn fibers retracted to the level of the glenohumeral joint line. An intermediate grade partial-thickness articular surface tear is present to the distal infraspinatus tendon.  Mild associated atrophy is present to the supraspinatus muscle belly.    Electronically signed by: Jose Herron  Date:    02/25/2025  Time:    15:35    MRI shows full-thickness tear of the supraspinatus.  Mild associated muscular atrophy.    Large Joint Aspiration/Injection: R subacromial bursa    Date/Time: 2/26/2025 11:00 AM    Performed by: Pranay Tsai MD  Authorized by: Pranay Tsai MD    Consent Done?:  Yes (Verbal)  Indications:  Pain  Site marked: the procedure site was marked    Timeout: prior to procedure the correct patient, procedure, and site was verified    Prep: patient was prepped and draped in usual sterile fashion    Local anesthesia used?: No      Details:  Needle Size:  22 G  Ultrasonic Guidance for needle placement?: No    Approach:  Posterior  Location:  Shoulder  Site:  R subacromial bursa  Medications:  3 mL LIDOcaine HCL 10 mg/ml (1%) 10 mg/mL (1 %); 6 mg betamethasone acetate-betamethasone sodium phosphate 6 mg/mL  Patient tolerance:  Patient tolerated the procedure well with no immediate complications            1. Right shoulder pain, unspecified chronicity    2. Injury of right rotator cuff, initial encounter          Plan:       Orders Placed This Encounter    X-ray Shoulder 2 or More Views Right    Ambulatory Referral/Consult to Physical Therapy/Occupational Therapy        Imaging and exam findings discussed.     Assessment & Plan    REFERRALS:  - Referred to physical therapy for shoulder  strengthening exercises.    PROCEDURES:  - Recommend corticosteroid injection into the shoulder for pain relief.  - Discussed potential future rotator cuff repair     FOLLOW UP:  - Follow up in 6 to 8 weeks.    PATIENT INSTRUCTIONS:  - Avoid using a sling.  - Move the shoulder as much as possible, focusing on light activities.  - Avoid reaching for items on high shelves or lifting heavy objects.       RICE. Symptomatic management.     All questions were answered. Patient happy and in agreement with the plan.     This note was generated with the assistance of ambient listening technology. Verbal consent was obtained by the patient and accompanying visitor(s) for the recording of patient appointment to facilitate this note. I attest to having reviewed and edited the generated note for accuracy, though some syntax or spelling errors may persist. Please contact the author of this note for any clarification.              [1]   Social History  Socioeconomic History    Marital status:    Tobacco Use    Smoking status: Former     Current packs/day: 0.00     Average packs/day: 0.3 packs/day for 5.0 years (1.3 ttl pk-yrs)     Types: Cigarettes     Quit date: 2024     Years since quittin.1    Smokeless tobacco: Never   Substance and Sexual Activity    Alcohol use: Not Currently    Drug use: Never    Sexual activity: Not Currently     Partners: Male     Social Drivers of Health     Financial Resource Strain: Low Risk  (2023)    Overall Financial Resource Strain (CARDIA)     Difficulty of Paying Living Expenses: Not hard at all   Food Insecurity: No Food Insecurity (2023)    Hunger Vital Sign     Worried About Running Out of Food in the Last Year: Never true     Ran Out of Food in the Last Year: Never true   Transportation Needs: No Transportation Needs (2023)    PRAPARE - Transportation     Lack of Transportation (Medical): No     Lack of Transportation (Non-Medical): No   Physical Activity:  Inactive (5/19/2023)    Exercise Vital Sign     Days of Exercise per Week: 0 days     Minutes of Exercise per Session: 0 min   Stress: Stress Concern Present (5/19/2023)    [employer] [place] [employer] - Occupational Stress Questionnaire     Feeling of Stress : To some extent   Housing Stability: Low Risk  (5/19/2023)    Housing Stability Vital Sign     Unable to Pay for Housing in the Last Year: No     Number of Places Lived in the Last Year: 1     Unstable Housing in the Last Year: No   [2]   Current Outpatient Medications:     betamethasone valerate 0.1% (VALISONE) 0.1 % Crea, APPLY 1 APPLICATION TOPICALLY 2 (TWO) TIMES A DAY., Disp: 45 g, Rfl: 3    calcium carbonate/vitamin D3 (CALTRATE 600 PLUS D ORAL), Take 1 tablet by mouth 2 (two) times daily., Disp: , Rfl:     cetirizine (ZYRTEC) 10 MG tablet, Take 10 mg by mouth every evening., Disp: , Rfl:     cyclobenzaprine (FLEXERIL) 10 MG tablet, TAKE 1 TABLET THREE TIMES DAILY AS NEEDED FOR MUSCLE SPASM(S), Disp: 270 tablet, Rfl: 3    DULoxetine (CYMBALTA) 60 MG capsule, Take 1 capsule (60 mg total) by mouth every morning., Disp: 90 capsule, Rfl: 1    esomeprazole (NEXIUM) 40 MG capsule, TAKE 1 CAPSULE BEFORE BREAKFAST, Disp: 90 capsule, Rfl: 3    furosemide (LASIX) 40 MG tablet, TAKE 1 TABLET TWICE DAILY, Disp: 180 tablet, Rfl: 3    HYDROcodone-acetaminophen (NORCO) 7.5-325 mg per tablet, Take 1 tablet by mouth 2 (two) times daily as needed for Pain., Disp: , Rfl:     losartan (COZAAR) 25 MG tablet, TAKE 1 TABLET EVERY MORNING, Disp: 90 tablet, Rfl: 3    losartan-hydrochlorothiazide 50-12.5 mg (HYZAAR) 50-12.5 mg per tablet, Take 1 tablet by mouth every morning., Disp: 90 tablet, Rfl: 3    lutein 40 mg Cap, Take 1 capsule by mouth every morning., Disp: , Rfl:     metFORMIN (GLUCOPHAGE) 500 MG tablet, TAKE 1 TABLET TWICE DAILY WITH MEALS, Disp: 180 tablet, Rfl: 3    metroNIDAZOLE (METROGEL) 0.75 % gel, Apply topically 2 (two) times daily., Disp: 135 g,  Rfl: 0    mirabegron (MYRBETRIQ) 50 mg Tb24, Take 1 tablet (50 mg total) by mouth once daily., Disp: 30 tablet, Rfl: 3    oxybutynin (DITROPAN-XL) 5 MG TR24, Take 1 tablet (5 mg total) by mouth once daily., Disp: 30 tablet, Rfl: 11    polyethylene glycol (GLYCOLAX) 17 gram PwPk, Take 17 g by mouth every evening., Disp: , Rfl:     potassium chloride SA (K-DUR,KLOR-CON) 20 MEQ tablet, TAKE 1 TABLET TWICE DAILY, Disp: 180 tablet, Rfl: 3    pramipexole (MIRAPEX) 0.125 MG tablet, TAKE 1 TABLET EVERY EVENING, Disp: 90 tablet, Rfl: 3    pregabalin (LYRICA) 150 MG capsule, Take 150 mg by mouth 2 (two) times a day., Disp: , Rfl:     semaglutide (OZEMPIC) 0.25 mg or 0.5 mg (2 mg/3 mL) pen injector, Inject 0.5 mg into the skin every 7 days., Disp: 3 mL, Rfl: 2    simvastatin (ZOCOR) 20 MG tablet, TAKE 1 TABLET EVERY EVENING, Disp: 90 tablet, Rfl: 3    TRUE METRIX GLUCOSE TEST STRIP Strp, CHECK BLOOD SUGAR ONE TIME DAILY, Disp: 100 strip, Rfl: 3    TRUEPLUS LANCETS 30 gauge Misc, CHECK BLOOD SUGAR ONE TIME DAILY, Disp: 100 each, Rfl: 3    valACYclovir (VALTREX) 500 MG tablet, Take 500 mg by mouth daily as needed., Disp: , Rfl:   No current facility-administered medications for this visit.    Facility-Administered Medications Ordered in Other Visits:     lactated ringers infusion, , Intravenous, Continuous, Jayjay Lagunas DO, Last Rate: 10 mL/hr at 09/09/22 0945, New Bag at 09/09/22 0945    lactated ringers infusion, , Intravenous, Continuous, Jayjay Lagunas DO, Last Rate: 10 mL/hr at 05/05/23 0713, New Bag at 09/08/23 1109    lactated ringers infusion, , Intravenous, Continuous, Jayjay Lagunas DO

## 2025-03-11 ENCOUNTER — CLINICAL SUPPORT (OUTPATIENT)
Dept: REHABILITATION | Facility: HOSPITAL | Age: 65
End: 2025-03-11
Attending: REHABILITATION UNIT
Payer: MEDICARE

## 2025-03-11 DIAGNOSIS — M75.101 TEAR OF RIGHT SUPRASPINATUS TENDON: Primary | ICD-10-CM

## 2025-03-11 DIAGNOSIS — S46.001A INJURY OF RIGHT ROTATOR CUFF, INITIAL ENCOUNTER: ICD-10-CM

## 2025-03-11 DIAGNOSIS — M25.511 RIGHT SHOULDER PAIN, UNSPECIFIED CHRONICITY: ICD-10-CM

## 2025-03-11 PROCEDURE — 97163 PT EVAL HIGH COMPLEX 45 MIN: CPT

## 2025-03-12 NOTE — PROGRESS NOTES
Outpatient Rehab    Physical Therapy Evaluation (only)    Patient Name: Kimberly Agarwal  MRN: [id number]  YOB: 1960  Encounter Date: 3/11/2025    Therapy Diagnosis:   Encounter Diagnoses   Name Primary?    Injury of right rotator cuff, initial encounter     Right shoulder pain, unspecified chronicity     Tear of right supraspinatus tendon Yes     Physician: Pranay Tsai MD    Physician Orders: Eval and Treat  Medical Diagnosis: Injury to the Right Rotator Cuff    Visit # / Visits Authorized:  determined by insurance  Date of Evaluation:  3/11/2025   Insurance Authorization Period: determined by insurance  Plan of Care Certification:  3/11/2025 to 6/03/2025      Time In: 0814   Time Out: 0912  Total Time: 58   Total Billable Time: 58    Intake Outcome Measure for FOTO Survey    Therapist reviewed FOTO scores for Kimberly Agarwal on 3/11/2025.   FOTO report - see Media section or FOTO account episode details.     Intake Score: 29 (53 predicted at visit 15)%         Subjective   History of Present Illness  Kimberly is a 64 y.o. female who reports to physical therapy with a chief concern of Right shoulder pain for 4-5 weeks.     The patient reports a medical diagnosis of Injury Right Rotator Cuff. The patient has experienced this issue since 02/26/25.   Diagnostic tests related to this condition: MRI studies.   MRI Studies Details: MRI: A full-thickness tear is present to the distal supraspinatus tendon with torn fibers retracted to the level of the glenohumeral joint line. An intermediate grade partial-thickness articular surface tear is present to the distal infraspinatus tendon. Mild associated atrophy is present to the supraspinatus muscle belly.    Dominant Hand: Right  History of Present Condition/Illness: Patient reports that 4-5 weeks ago while getting out of bed the pillow tangled underneath her legs and she slide out of bed onto her knees. While trying to get up really pushed and  pulled with the right arm and felt pain. Since that time the pain worsened and she has been unable to use the arm with activities, ADL's or even getting dressed. She is able to get pain free at rest with the right positioning but with any kind of movement of the arm she has pain. Trying to reach or just moving the arm away from her side the pain gets really high. She has seen orthopedics and was given a corticosteroid injection. his helped for a few days but she now feels that it has worn off. With ROM her pain can reach 8/10. She has MRI confirmation of a full thickness tear of the Supraspinatus and a partial thickness tear of the Infraspinatus. Surgery was discussed but was told that repair of the cuff may not be successful. Will try physical therapy to try to restore mobility, strength and stability    Pain     Patient reports a current pain level of 2/10. Pain at best is reported as 0/10. Pain at worst is reported as 8/10.   Location: pain in the right shoulder; can get 0/10 pain with proper positioning, 8/10 pain with activity  Clinical Progression (since onset): Stable  Pain Qualities: Aching, Discomfort, Sharp, Tenderness, Tightness, Throbbing  Pain-Relieving Factors: Ice, Activity modification, Change in position, Immobilization, Medications - prescription, Medications - over-the-counter  Pain-Aggravating Factors: Cooking, Exercise, Holding objects, Lifting, Reaching, Stretching         Review of Systems  Patient reports: Arthritis, Decreased Range of Motion, and Joint Pain  Additional Review of Systems Details: HTN (hypertension), HLD (hyperlipidemia), High cholesterol, Hypokalemia, diabetes, Chronic pain, Sleep apnea, TOTAL KNEE ARTHROPLASTY, LUMBAR FUSION     Treatment History  Treatments  Previously Received Treatments: No  Currently Receiving Treatments: No    Living Arrangements  Living Situation  Housing: Home independently  Living Arrangements: Spouse/significant other  Support Systems:  Spouse/significant other, Family members        Employment  Employment Status: On disability          Past Medical History/Physical Systems Review:   Kimberly Agarwal  has a past medical history of Achilles tendinitis, left leg, Astigmatism, Bursitis of right shoulder, Chronic back pain, Chronic pain, GERD (gastroesophageal reflux disease), Hepatitis a without hepatic coma, Herpes labialis, High cholesterol, HLD (hyperlipidemia), HTN (hypertension), Hyperopia, Hypokalemia, Hypokalemia, Lower extremity edema, OAB (overactive bladder), Personal history of colonic polyps, Presbyopia, RLS (restless legs syndrome), Sciatica, Sleep apnea, Sleep disorder, Spondylosis of lumbosacral spine with radiculopathy, Type 2 diabetes mellitus with unspecified diabetic retinopathy without macular edema, Urinary incontinence, mixed, Vitamin D deficiency, and Wound of right leg.    Kimberly Agarwal  has a past surgical history that includes Tonsillectomy; Appendectomy; Open reduction and internal fixation (ORIF) of injury of wrist (Right); Esophagogastroduodenoscopy (04/27/2022); Colonoscopy w/ biopsies and polypectomy (04/27/2022); Radiofrequency ablation of lumbar medial branch nerve at single level (Bilateral, 09/09/2022); Lumbar fusion (2010); Total knee arthroplasty (Left, 2020); Epidural steroid injection into lumbar spine; Esophagogastroduodenoscopy (12/09/2019); Spine surgery; Liver biopsy; block, nerve, genicular (Bilateral, 05/05/2023); Colonoscopy w/ biopsies and polypectomy (12/09/2019); and Radiofrequency ablation of lumbar medial branch nerve at single level (Bilateral, 9/8/2023).    Kimberly has a current medication list which includes the following prescription(s): betamethasone valerate 0.1%, calcium carbonate/vitamin d3, cetirizine, cyclobenzaprine, duloxetine, esomeprazole, furosemide, hydrocodone-acetaminophen, losartan, losartan-hydrochlorothiazide 50-12.5 mg, lutein, metformin, metronidazole, mirabegron,  oxybutynin, polyethylene glycol, potassium chloride sa, pramipexole, pregabalin, ozempic, simvastatin, true metrix glucose test strip, trueplus lancets, and valacyclovir, and the following Facility-Administered Medications: lactated ringers, lactated ringers, and lactated ringers.    Review of patient's allergies indicates:   Allergen Reactions    Latex Rash    Adhesive tape-silicones Blisters    Ciprofloxacin         Objective   Posture  Patient presents with a Forward head position.     Shoulders are Depressed and Rounded. Right scapula is: Downwardly Rotated              Upper Extremity Sensation  General Upper Extremity Sensation  Intact: Right and Left  Right Upper Extremity Sensation  Intact: Light Touch, Sharp/Dull Discrimination, Kinesthesia, Proprioception, and Hot/Cold Discrimination  Right Upper Extremity Sensation Stocking Glove Pattern: No    Left Upper Extremity Sensation  Intact: Light Touch, Sharp/Dull Discrimination, Kinesthesia, Proprioception, and Hot/Cold Discrimination  Left Upper Extremity Sensation Stocking Glove Pattern: No             Shoulder Palpation  Right Shoulder Palpation  Abnormal: Muscle  Unremarkable: Bony Prominence/Bursa and Tendon/Ligament  Right Shoulder Muscle Palpation Observations: high TTP at both the supraspinatus and infraspinatus insertion, moderate tenderness right medial scapula and superior shoulder       Left Shoulder Palpation  Unremarkable: Muscle, Bony Prominence/Bursa, and Tendon/Ligament              Shoulder Range of Motion  Right Shoulder   Active (deg) Passive (deg) Pain   Flexion 75 170 Yes   Extension         Scaption 65 165 Yes   ABduction 60 160 Yes   ADduction         Horizontal ABduction         Horizontal ADduction         External Rotation (Shoulder ABducted 0 degrees) 55 70 Yes   External Rotation (Shoulder ABducted 45 degrees)         External Rotation (Shoulder ABducted 90 degrees)         Internal Rotation (Shoulder ABducted 0 degrees) 60 75 Yes    Internal Rotation (Shoulder ABducted 45 degrees)         Internal Rotation (Shoulder ABducted 90 degrees)             Shoulder, Elbow, or Forearm Range of Motion Details: Difficult to assess due to high irritability         Shoulder Strength - Planes of Motion   Right Strength Right Pain Left Strength Left  Pain   Flexion 3- Yes       Extension           ABduction 3- Yes       ADduction           Horizontal ABduction           Horizontal ADduction           Internal Rotation 0°           Internal Rotation 90° 4- Yes       External Rotation 0° 3+ Yes       External Rotation 90°               Shoulder Strength - Rotator Cuff Muscles   Right Strength Right Pain Left Strength Left  Pain   Supraspinatus 2+ Yes       Infraspinatus 3- Yes       Teres Minor 4- Yes       Subscapularis 4- Yes         Shoulder Strength Details  Difficult to assess due to high irritability                 Assessment & Plan   Assessment  Kimberly presents with a condition of High complexity.   Presentation of Symptoms: Stable  Will Comorbidities Impact Care: Yes  Chronic back and neck pain, chronic knee pain    Functional Limitations: Activity tolerance, Decreased ambulation distance/endurance, Disrupted sleep pattern, Functional mobility, Pain when reaching, Pain with ADLs/IADLs, Painful locomotion/ambulation, Range of motion, Reaching, Squatting, Standing tolerance  Impairments: Activity intolerance, Pain with functional activity, Weight-bearing intolerance  Personal Factors Affecting Prognosis: Pain    Patient Goal for Therapy (PT): Minimize pain and allow for use of the right arm with all ADL's and activitiers around the home.  Prognosis: Good  Assessment Details: Kimberly is a 64 y.o. female referred with diagnosis of Injury to right Rotator Cuff. MRI imaging reveals a full-thickness tear to the distal supraspinatus tendon with torn fibers retracted to the level of the glenohumeral joint line. An intermediate grade partial-thickness  articular surface tear is to the distal infraspinatus tendon. Mild associated atrophy is present to the supraspinatus muscle belly. She demonstrates pain with decreased strength and ROM of the right shoulder, decreased functional use of the right arm all resulting in decreased functional capacity with ADL's and work performance around the home. Patient will benefit from skilled outpatient physical therapy to address the deficits stated above, provide education, and to maximize the pt's level of functional independence.    Plan  From a physical therapy perspective, the patient would benefit from: Skilled Rehab Services    Planned therapy interventions include: Therapeutic exercise, Therapeutic activities, Neuromuscular re-education, and Manual therapy.    Planned modalities to include: Cryotherapy (cold pack), Electrical stimulation - passive/unattended, Thermotherapy (hot pack), and Ultrasound.        Visit Frequency: 2 times Per Week for 12 Weeks.       This plan was discussed with Patient.   Discussion participants: Agreed Upon Plan of Care  Plan details: Rehabilitative ROM and strengthening exercises focusing on decreasing pain, restoring mobility, strength and stability of the right shoulder          Patient's spiritual, cultural, and educational needs considered and patient agreeable to plan of care and goals.     Education  Education was done with Patient. The patient's learning style includes Demonstration, Listening, and Pictures/video. The patient Demonstrates understanding and Verbalizes understanding.         Instructed patient on home exercises consisting of pendulums, assisted ROM with opposite arm, scap elevation, scap retractions       Goals:   Active       LTG       Patient will report at least 20% disability reduction from initial Quick Dash score to indicate clinically significant functional improvement         Start:  03/11/25    Expected End:  06/03/25            Patient will report at least 20%  increase on initial FOTO score to indicate clinically significant functional improvement         Start:  03/11/25    Expected End:  06/03/25            Patient will report 70% overall perceived improvement         Start:  03/11/25    Expected End:  06/03/25            Patient will demonstrate independence with final HEP       Start:  03/11/25    Expected End:  06/03/25            Patient will demonstrate 80% of full active ROM of the right shoulder and report 2/10 pain or less       Start:  03/11/25    Expected End:  06/03/25               STG       Patient will report at least 10% disability reduction from initial Quick Dash score to indicate clinically significant functional improvement         Start:  03/11/25    Expected End:  04/22/25            Patient will report at least 10% increase on initial FOTO score to indicate clinically significant functional improvement         Start:  03/11/25    Expected End:  04/22/25            Patient will report 50% overall perceived improvement         Start:  03/11/25    Expected End:  04/22/25            Patient will demonstrate independence with HEP       Start:  03/11/25    Expected End:  04/22/25            Patient will demonstrate full passive ROM of the right shoulder and report 2/10 pain or less       Start:  03/11/25    Expected End:  04/22/25                Catrachito Quezada PT

## 2025-03-18 ENCOUNTER — CLINICAL SUPPORT (OUTPATIENT)
Dept: REHABILITATION | Facility: HOSPITAL | Age: 65
End: 2025-03-18
Payer: MEDICARE

## 2025-03-18 DIAGNOSIS — M75.101 TEAR OF RIGHT SUPRASPINATUS TENDON: ICD-10-CM

## 2025-03-18 DIAGNOSIS — M25.511 RIGHT SHOULDER PAIN, UNSPECIFIED CHRONICITY: ICD-10-CM

## 2025-03-18 DIAGNOSIS — S46.001A INJURY OF RIGHT ROTATOR CUFF, INITIAL ENCOUNTER: Primary | ICD-10-CM

## 2025-03-18 PROCEDURE — 97140 MANUAL THERAPY 1/> REGIONS: CPT

## 2025-03-18 PROCEDURE — 97110 THERAPEUTIC EXERCISES: CPT

## 2025-03-18 PROCEDURE — 97014 ELECTRIC STIMULATION THERAPY: CPT

## 2025-03-18 NOTE — PROGRESS NOTES
Outpatient Rehab    Physical Therapy Visit    Patient Name: Kimberly Agarwal  MRN: [id number]  YOB: 1960  Encounter Date: 3/18/2025    Therapy Diagnosis:   Encounter Diagnoses   Name Primary?    Injury of right rotator cuff, initial encounter Yes    Right shoulder pain, unspecified chronicity     Tear of right supraspinatus tendon      Physician: Pranay Tsai MD    Physician Orders: Eval and Treat  Medical Diagnosis: Unspecified injury of muscle(s) and tendon(s) of the rotator cuff of right shoulder, initial encounter    Injury to the Right Rotator Cuff     Visit # / Visits Authorized:  determined by insurance  Date of Evaluation:  3/11/2025   Insurance Authorization Period: determined by insurance  Plan of Care Certification:  3/11/2025 to 6/03/2025     Time In: 0813   Time Out: 0921  Total Time: 68   Total Billable Time: 68    FOTO:  Therapist reviewed FOTO scores for Kimberly Agarwal on 3/11/2025.   FOTO report - see Media section or FOTO account episode details.      Intake: Patient's Physical FS Primary Measure:  29 (goal is  53@ visit # 15)  Intake:Risk Adjusted Statistical FOTO: 43  Intake:Limitation Score: 71%  Intake:Category: Carrying  Intake: DASH:  80.8% (higher score = greater disability)     Intake Score: 29 (53 predicted at visit 15)%       Subjective   Kimberly reports that she has been just not using the shoulder and protecting it. Hurts to much to move it. Able to sleep pretty well with the right positioning..  Pain reported as 0/10. 0/10 pain at rest and 8/10 with ROM    Objective    Shoulder Range of Motion  Right Shoulder    Active (deg) Passive (deg) Pain   Flexion 75 170 Yes   Extension         Scaption 65 165 Yes   ABduction 60 160 Yes   ADduction         Horizontal ABduction         Horizontal ADduction         External Rotation (Shoulder ABducted 0 degrees) 55 70 Yes   External Rotation (Shoulder ABducted 45 degrees)         External Rotation (Shoulder ABducted 90  degrees)         Internal Rotation (Shoulder ABducted 0 degrees) 60 75 Yes   Internal Rotation (Shoulder ABducted 45 degrees)         Internal Rotation (Shoulder ABducted 90 degrees)               Shoulder, Elbow, or Forearm Range of Motion Details: Difficult to assess due to high irritability        Shoulder Strength - Planes of Motion    Right Strength Right Pain Left Strength Left  Pain   Flexion 3- Yes       Extension           ABduction 3- Yes       ADduction           Horizontal ABduction           Horizontal ADduction           Internal Rotation 0°           Internal Rotation 90° 4- Yes       External Rotation 0° 3+ Yes       External Rotation 90°                 Shoulder Strength - Rotator Cuff Muscles    Right Strength Right Pain Left Strength Left  Pain   Supraspinatus 2+ Yes       Infraspinatus 3- Yes       Teres Minor 4- Yes       Subscapularis 4- Yes          Shoulder Strength Details  Difficult to assess due to high irritability           Treatment:  Therapeutic Exercise  TE 1: 38 mins; pendulum's, pulley's, scap elevations, scap retractions, shoudler isometrics, active ER, active IR, gentle reaches  Manual Therapy  MT 1: 15 mins, gentle PROM and mobs  Modalities  Electrical Stimulation (Parameters): 15 mins; IFC e-stim with cold pack to the the right shoulder    Time Entry(in minutes):  E-Stim (Unattended) Time Entry: 15 (IFC e-stim with cold pack post ex)  Manual Therapy Time Entry: 15  Therapeutic Exercise Time Entry: 38    Assessment & Plan   Assessment: Initiated the plan of care and she tolerated it fair, she was limited by pain. Initially quite stiff with the pulleys but loosened up and felt a little better. She was able to achieve full elevation with the pulley's.  Evaluation/Treatment Tolerance: Patient limited by pain    Patient will continue to benefit from skilled outpatient physical therapy to address the deficits listed in the problem list box on initial evaluation, provide pt/family  education and to maximize pt's level of independence in the home and community environment.     Patient's spiritual, cultural, and educational needs considered and patient agreeable to plan of care and goals.           Plan: Continue with the plan of care to reduce pain and inflammation, restore ROM and improve shoulder stability. Will progress as tolerated.    Goals:   Active       LTG       Patient will report at least 20% disability reduction from initial Quick Dash score to indicate clinically significant functional improvement   (Progressing)       Start:  03/11/25    Expected End:  06/03/25            Patient will report at least 20% increase on initial FOTO score to indicate clinically significant functional improvement   (Progressing)       Start:  03/11/25    Expected End:  06/03/25            Patient will report 70% overall perceived improvement   (Progressing)       Start:  03/11/25    Expected End:  06/03/25            Patient will demonstrate independence with final HEP (Progressing)       Start:  03/11/25    Expected End:  06/03/25            Patient will demonstrate 80% of full active ROM of the right shoulder and report 2/10 pain or less (Progressing)       Start:  03/11/25    Expected End:  06/03/25               STG       Patient will report at least 10% disability reduction from initial Quick Dash score to indicate clinically significant functional improvement   (Progressing)       Start:  03/11/25    Expected End:  04/22/25            Patient will report at least 10% increase on initial FOTO score to indicate clinically significant functional improvement   (Progressing)       Start:  03/11/25    Expected End:  04/22/25            Patient will report 50% overall perceived improvement   (Progressing)       Start:  03/11/25    Expected End:  04/22/25            Patient will demonstrate independence with HEP (Progressing)       Start:  03/11/25    Expected End:  04/22/25            Patient will  demonstrate full passive ROM of the right shoulder and report 2/10 pain or less (Progressing)       Start:  03/11/25    Expected End:  04/22/25                Catrachito Quezada, PT

## 2025-03-20 ENCOUNTER — CLINICAL SUPPORT (OUTPATIENT)
Dept: REHABILITATION | Facility: HOSPITAL | Age: 65
End: 2025-03-20
Payer: MEDICARE

## 2025-03-20 DIAGNOSIS — M25.511 RIGHT SHOULDER PAIN, UNSPECIFIED CHRONICITY: ICD-10-CM

## 2025-03-20 DIAGNOSIS — M75.101 TEAR OF RIGHT SUPRASPINATUS TENDON: ICD-10-CM

## 2025-03-20 DIAGNOSIS — S46.001A INJURY OF RIGHT ROTATOR CUFF, INITIAL ENCOUNTER: Primary | ICD-10-CM

## 2025-03-20 PROCEDURE — 97014 ELECTRIC STIMULATION THERAPY: CPT

## 2025-03-20 PROCEDURE — 97140 MANUAL THERAPY 1/> REGIONS: CPT

## 2025-03-20 PROCEDURE — 97110 THERAPEUTIC EXERCISES: CPT

## 2025-03-20 NOTE — PROGRESS NOTES
Outpatient Rehab    Physical Therapy Visit    Patient Name: Kimberly Agarwal  MRN: [id number]  YOB: 1960  Encounter Date: 3/20/2025    Therapy Diagnosis:   Encounter Diagnoses   Name Primary?    Injury of right rotator cuff, initial encounter Yes    Right shoulder pain, unspecified chronicity     Tear of right supraspinatus tendon      Physician: Pranay Tsai MD    Physician Orders: Eval and Treat  Medical Diagnosis: Unspecified injury of muscle(s) and tendon(s) of the rotator cuff of right shoulder, initial encounter    Injury to the Right Rotator Cuff     Visit # / Visits Authorized:  determined by insurance  Date of Evaluation:  3/11/2025   Insurance Authorization Period: determined by insurance  Plan of Care Certification:  3/11/2025 to 6/03/2025     Time In: 0811   Time Out: 0919  Total Time: 68   Total Billable Time: 68    FOTO:  Therapist reviewed FOTO scores for Kimberly Agarwal on 3/11/2025.   FOTO report - see Media section or FOTO account episode details.      Intake: Patient's Physical FS Primary Measure:  29 (goal is  53@ visit # 15)  Intake:Risk Adjusted Statistical FOTO: 43  Intake:Limitation Score: 71%  Intake:Category: Carrying  Intake: DASH:  80.8% (higher score = greater disability)     Intake Score: 29 (53 predicted at visit 15)%       Subjective   Kimberly in tears today not because of the shoulder but because her sister passed away. She is having shoulder pain with movement but is better at rest and she is pleased..    0/10 pain at rest and 8/10 with ROM    Objective    Shoulder Range of Motion  Right Shoulder    Active (deg) Passive (deg) Pain   Flexion 75 170 Yes   Extension         Scaption 65 165 Yes   ABduction 60 160 Yes   ADduction         Horizontal ABduction         Horizontal ADduction         External Rotation (Shoulder ABducted 0 degrees) 55 70 Yes   External Rotation (Shoulder ABducted 45 degrees)         External Rotation (Shoulder ABducted 90 degrees)          Internal Rotation (Shoulder ABducted 0 degrees) 60 75 Yes   Internal Rotation (Shoulder ABducted 45 degrees)         Internal Rotation (Shoulder ABducted 90 degrees)               Shoulder, Elbow, or Forearm Range of Motion Details: Difficult to assess due to high irritability        Shoulder Strength - Planes of Motion    Right Strength Right Pain Left Strength Left  Pain   Flexion 3- Yes       Extension           ABduction 3- Yes       ADduction           Horizontal ABduction           Horizontal ADduction           Internal Rotation 0°           Internal Rotation 90° 4- Yes       External Rotation 0° 3+ Yes       External Rotation 90°                 Shoulder Strength - Rotator Cuff Muscles    Right Strength Right Pain Left Strength Left  Pain   Supraspinatus 2+ Yes       Infraspinatus 3- Yes       Teres Minor 4- Yes       Subscapularis 4- Yes          Shoulder Strength Details  Difficult to assess due to high irritability           Treatment: 68  Therapeutic Exercise  TE 1: 38 mins; pendulum's, pulley's, scap elevations, scap retractions, shoudler isometrics, active ER, active IR, gentle reaches  Manual Therapy  MT 1: 15 mins, gentle PROM and mobs  Modalities  Electrical Stimulation (Parameters): 15 mins; IFC e-stim with cold pack to the the right shoulder    Time Entry(in minutes):  E-Stim (Unattended) Time Entry: 15 (IFC e-stim with cold pack post ex)  Manual Therapy Time Entry: 15  Therapeutic Exercise Time Entry: 38    Assessment & Plan   Assessment: Contiued with the plan of care. Shoulder remains highly irritable but there was some improved pain free ROM with passive ROM.  Evaluation/Treatment Tolerance: Patient limited by pain    Patient will continue to benefit from skilled outpatient physical therapy to address the deficits listed in the problem list box on initial evaluation, provide pt/family education and to maximize pt's level of independence in the home and community environment.     Patient's  spiritual, cultural, and educational needs considered and patient agreeable to plan of care and goals.           Plan: Continue with the plan of care to reduce pain and inflammation, restore ROM and improve shoulder stability. Will progress as tolerated.    Goals:   Active       LTG       Patient will report at least 20% disability reduction from initial Quick Dash score to indicate clinically significant functional improvement   (Progressing)       Start:  03/11/25    Expected End:  06/03/25            Patient will report at least 20% increase on initial FOTO score to indicate clinically significant functional improvement   (Progressing)       Start:  03/11/25    Expected End:  06/03/25            Patient will report 70% overall perceived improvement   (Progressing)       Start:  03/11/25    Expected End:  06/03/25            Patient will demonstrate independence with final HEP (Progressing)       Start:  03/11/25    Expected End:  06/03/25            Patient will demonstrate 80% of full active ROM of the right shoulder and report 2/10 pain or less (Progressing)       Start:  03/11/25    Expected End:  06/03/25               STG       Patient will report at least 10% disability reduction from initial Quick Dash score to indicate clinically significant functional improvement   (Progressing)       Start:  03/11/25    Expected End:  04/22/25            Patient will report at least 10% increase on initial FOTO score to indicate clinically significant functional improvement   (Progressing)       Start:  03/11/25    Expected End:  04/22/25            Patient will report 50% overall perceived improvement   (Progressing)       Start:  03/11/25    Expected End:  04/22/25            Patient will demonstrate independence with HEP (Progressing)       Start:  03/11/25    Expected End:  04/22/25            Patient will demonstrate full passive ROM of the right shoulder and report 2/10 pain or less (Progressing)       Start:   03/11/25    Expected End:  04/22/25                Catrachito Quezada PT

## 2025-03-25 ENCOUNTER — CLINICAL SUPPORT (OUTPATIENT)
Dept: REHABILITATION | Facility: HOSPITAL | Age: 65
End: 2025-03-25
Payer: MEDICARE

## 2025-03-25 DIAGNOSIS — S46.001A INJURY OF RIGHT ROTATOR CUFF, INITIAL ENCOUNTER: Primary | ICD-10-CM

## 2025-03-25 DIAGNOSIS — M25.511 RIGHT SHOULDER PAIN, UNSPECIFIED CHRONICITY: ICD-10-CM

## 2025-03-25 DIAGNOSIS — M75.101 TEAR OF RIGHT SUPRASPINATUS TENDON: ICD-10-CM

## 2025-03-25 PROCEDURE — 97014 ELECTRIC STIMULATION THERAPY: CPT

## 2025-03-25 PROCEDURE — 97530 THERAPEUTIC ACTIVITIES: CPT

## 2025-03-25 PROCEDURE — 97010 HOT OR COLD PACKS THERAPY: CPT

## 2025-03-25 PROCEDURE — 97110 THERAPEUTIC EXERCISES: CPT

## 2025-03-25 RX ORDER — BETAMETHASONE SODIUM PHOSPHATE AND BETAMETHASONE ACETATE 3; 3 MG/ML; MG/ML
6 INJECTION, SUSPENSION INTRA-ARTICULAR; INTRALESIONAL; INTRAMUSCULAR; SOFT TISSUE
Status: DISCONTINUED | OUTPATIENT
Start: 2025-02-26 | End: 2025-03-25 | Stop reason: HOSPADM

## 2025-03-25 RX ORDER — LIDOCAINE HYDROCHLORIDE 10 MG/ML
3 INJECTION, SOLUTION INFILTRATION; PERINEURAL
Status: DISCONTINUED | OUTPATIENT
Start: 2025-02-26 | End: 2025-03-25 | Stop reason: HOSPADM

## 2025-03-25 NOTE — PROGRESS NOTES
Outpatient Rehab    Physical Therapy Visit    Patient Name: Kimberly Agarwal  MRN: [id number]  YOB: 1960  Encounter Date: 3/25/2025    Therapy Diagnosis:   Encounter Diagnoses   Name Primary?    Injury of right rotator cuff, initial encounter Yes    Right shoulder pain, unspecified chronicity     Tear of right supraspinatus tendon      Physician: Pranay Tsai MD    Physician Orders: Eval and Treat  Medical Diagnosis: Unspecified injury of muscle(s) and tendon(s) of the rotator cuff of right shoulder, initial encounter    Injury to the Right Rotator Cuff     Visit # / Visits Authorized:  determined by insurance  Date of Evaluation:  3/11/2025   Insurance Authorization Period: 3/11/2025 to 6/03/2025   Plan of Care Certification:  3/11/2025 to 6/03/2025     Time In: 0755   Time Out: 0913  Total Time: 78   Total Billable Time: 78    FOTO:  Therapist reviewed FOTO scores for Kimberly Agarwal on 3/11/2025.   FOTO report - see Media section or FOTO account episode details.      Intake: Patient's Physical FS Primary Measure:  29 (goal is  53@ visit # 15)  Intake:Risk Adjusted Statistical FOTO: 43  Intake:Limitation Score: 71%  Intake:Category: Carrying  Intake: DASH:  80.8% (higher score = greater disability)     Intake Score: 29 (53 predicted at visit 15)%       Subjective   Kimberly here today reporting improvement with her shoulder. She continues to have pain with reaching up and behind but she is doing more things around the house. She has learned what to avoid. Has been cleaning her trailer and when the arm get too sore she will ice and it helps..    0/10 pain at rest and 7/10 with ROM    Objective    Shoulder Range of Motion  Right Shoulder    Active (deg) Passive (deg) Pain   Flexion 75 170 Yes   Extension         Scaption 65 165 Yes   ABduction 60 160 Yes   ADduction         Horizontal ABduction         Horizontal ADduction         External Rotation (Shoulder ABducted 0 degrees) 55 70 Yes    External Rotation (Shoulder ABducted 45 degrees)         External Rotation (Shoulder ABducted 90 degrees)         Internal Rotation (Shoulder ABducted 0 degrees) 60 75 Yes   Internal Rotation (Shoulder ABducted 45 degrees)         Internal Rotation (Shoulder ABducted 90 degrees)               Shoulder, Elbow, or Forearm Range of Motion Details: Difficult to assess due to high irritability        Shoulder Strength - Planes of Motion    Right Strength Right Pain Left Strength Left  Pain   Flexion 3- Yes       Extension           ABduction 3- Yes       ADduction           Horizontal ABduction           Horizontal ADduction           Internal Rotation 0°           Internal Rotation 90° 4- Yes       External Rotation 0° 3+ Yes       External Rotation 90°                 Shoulder Strength - Rotator Cuff Muscles    Right Strength Right Pain Left Strength Left  Pain   Supraspinatus 2+ Yes       Infraspinatus 3- Yes       Teres Minor 4- Yes       Subscapularis 4- Yes          Shoulder Strength Details  Difficult to assess due to high irritability           Treatment:   Therapeutic Exercise  TE 1: 38 mins; pendulum's, pulley's, scap elevations, scap retractions, shoudler isometrics, active ER, active IR, gentle reaches  Therapeutic Activity  TA 1: 15 mins; NuStep push/pull  Modalities  Moist Heat (min): 10 mins; moist heat to the right shoulder pre ex  Electrical Stimulation (Parameters): 15 mins; IFC e-stim with cold pack to the the right shoulder    Time Entry(in minutes): 78  E-Stim (Unattended) Time Entry: 15 (IFC e-stim with cold pack post ex)  Hot/Cold Pack Time Entry: 10 (moist heat to the left shoulder pre ex)  Therapeutic Activity Time Entry: 15  Therapeutic Exercise Time Entry: 38    Assessment & Plan   Assessment: Contiued with the plan of care. Shoulder less irritable overall. She is demonstrating some improved pain free ROM and there is more functional use.  Evaluation/Treatment Tolerance: Patient limited by  pain    Patient will continue to benefit from skilled outpatient physical therapy to address the deficits listed in the problem list box on initial evaluation, provide pt/family education and to maximize pt's level of independence in the home and community environment.     Patient's spiritual, cultural, and educational needs considered and patient agreeable to plan of care and goals.           Plan: Continue with the plan of care to reduce pain and inflammation, restore ROM and improve shoulder stability. Will progress as tolerated.    Goals:   Active       LTG       Patient will report at least 20% disability reduction from initial Quick Dash score to indicate clinically significant functional improvement   (Progressing)       Start:  03/11/25    Expected End:  06/03/25            Patient will report at least 20% increase on initial FOTO score to indicate clinically significant functional improvement   (Progressing)       Start:  03/11/25    Expected End:  06/03/25            Patient will report 70% overall perceived improvement   (Progressing)       Start:  03/11/25    Expected End:  06/03/25            Patient will demonstrate independence with final HEP (Progressing)       Start:  03/11/25    Expected End:  06/03/25            Patient will demonstrate 80% of full active ROM of the right shoulder and report 2/10 pain or less (Progressing)       Start:  03/11/25    Expected End:  06/03/25               STG       Patient will report at least 10% disability reduction from initial Quick Dash score to indicate clinically significant functional improvement   (Progressing)       Start:  03/11/25    Expected End:  04/22/25            Patient will report at least 10% increase on initial FOTO score to indicate clinically significant functional improvement   (Progressing)       Start:  03/11/25    Expected End:  04/22/25            Patient will report 50% overall perceived improvement   (Progressing)       Start:  03/11/25     Expected End:  04/22/25            Patient will demonstrate independence with HEP (Progressing)       Start:  03/11/25    Expected End:  04/22/25            Patient will demonstrate full passive ROM of the right shoulder and report 2/10 pain or less (Progressing)       Start:  03/11/25    Expected End:  04/22/25                Catrachito Quezada PT

## 2025-03-27 ENCOUNTER — CLINICAL SUPPORT (OUTPATIENT)
Dept: REHABILITATION | Facility: HOSPITAL | Age: 65
End: 2025-03-27
Payer: MEDICARE

## 2025-03-27 DIAGNOSIS — M25.511 RIGHT SHOULDER PAIN, UNSPECIFIED CHRONICITY: ICD-10-CM

## 2025-03-27 DIAGNOSIS — S46.001A INJURY OF RIGHT ROTATOR CUFF, INITIAL ENCOUNTER: Primary | ICD-10-CM

## 2025-03-27 DIAGNOSIS — M75.101 TEAR OF RIGHT SUPRASPINATUS TENDON: ICD-10-CM

## 2025-03-27 PROCEDURE — 97530 THERAPEUTIC ACTIVITIES: CPT

## 2025-03-27 PROCEDURE — 97010 HOT OR COLD PACKS THERAPY: CPT

## 2025-03-27 PROCEDURE — 97014 ELECTRIC STIMULATION THERAPY: CPT

## 2025-03-27 PROCEDURE — 97110 THERAPEUTIC EXERCISES: CPT

## 2025-03-27 NOTE — PROGRESS NOTES
Outpatient Rehab    Physical Therapy Visit    Patient Name: Kimberly Agarwal  MRN: [id number]  YOB: 1960  Encounter Date: 3/27/2025    Therapy Diagnosis:   Encounter Diagnoses   Name Primary?    Injury of right rotator cuff, initial encounter Yes    Right shoulder pain, unspecified chronicity     Tear of right supraspinatus tendon      Physician: Pranay Tsai MD    Physician Orders: Eval and Treat  Medical Diagnosis: Unspecified injury of muscle(s) and tendon(s) of the rotator cuff of right shoulder, initial encounter    Injury to the Right Rotator Cuff     Visit # / Visits Authorized:  visit #4 of 24  Date of Evaluation:  3/11/2025   Insurance Authorization Period: 3/11/2025 to 6/03/2025   Plan of Care Certification:  3/11/2025 to 6/03/2025     Time In: 0759   Time Out: 0917  Total Time: 78   Total Billable Time: 78    FOTO:  Therapist reviewed FOTO scores for Kimberly Agarwal on 3/11/2025.   FOTO report - see Media section or FOTO account episode details.      Intake: Patient's Physical FS Primary Measure:  29 (goal is  53@ visit # 15)  Intake:Risk Adjusted Statistical FOTO: 43  Intake:Limitation Score: 71%  Intake:Category: Carrying  Intake: DASH:  80.8% (higher score = greater disability)     Intake Score: 29 (53 predicted at visit 15)%       Subjective   Kimberly reports that her pain is better. No real pain at rest but high pain level with ROM. Sleeping well..    0/10 pain at rest and 7/10 with ROM    Objective    Shoulder Range of Motion  Right Shoulder    Active (deg) Passive (deg) Pain   Flexion 75 170 Yes   Extension         Scaption 65 165 Yes   ABduction 60 160 Yes   ADduction         Horizontal ABduction         Horizontal ADduction         External Rotation (Shoulder ABducted 0 degrees) 55 70 Yes   External Rotation (Shoulder ABducted 45 degrees)         External Rotation (Shoulder ABducted 90 degrees)         Internal Rotation (Shoulder ABducted 0 degrees) 60 75 Yes   Internal  Rotation (Shoulder ABducted 45 degrees)         Internal Rotation (Shoulder ABducted 90 degrees)               Shoulder, Elbow, or Forearm Range of Motion Details: Difficult to assess due to high irritability        Shoulder Strength - Planes of Motion    Right Strength Right Pain Left Strength Left  Pain   Flexion 3- Yes       Extension           ABduction 3- Yes       ADduction           Horizontal ABduction           Horizontal ADduction           Internal Rotation 0°           Internal Rotation 90° 4- Yes       External Rotation 0° 3+ Yes       External Rotation 90°                 Shoulder Strength - Rotator Cuff Muscles    Right Strength Right Pain Left Strength Left  Pain   Supraspinatus 2+ Yes       Infraspinatus 3- Yes       Teres Minor 4- Yes       Subscapularis 4- Yes          Shoulder Strength Details  Difficult to assess due to high irritability         Treatment:   Therapeutic Exercise  TE 1: 38 mins; pendulum's, pulley's, scap elevations, scap retractions, shoudler isometrics, active ER, active IR, gentle reaches  Therapeutic Activity  TA 1: 15 mins; NuStep push/pull  Modalities  Moist Heat (min): 10 mins; moist heat to the right shoulder pre ex  Electrical Stimulation (Parameters): 15 mins; IFC e-stim with cold pack to the the right shoulder    Time Entry(in minutes): 78  E-Stim (Unattended) Time Entry: 15 (IFC e-stim with cold pack post ex)  Hot/Cold Pack Time Entry: 10 (moist heat to the left shoulder pre ex)  Therapeutic Activity Time Entry: 15  Therapeutic Exercise Time Entry: 38    Assessment & Plan   Assessment: Contiued with the plan of care. Shoulder less irritable overall. She is demonstrating some improved pain free ROM and there is more functional use.  Evaluation/Treatment Tolerance: Patient limited by pain    Patient will continue to benefit from skilled outpatient physical therapy to address the deficits listed in the problem list box on initial evaluation, provide pt/family education  and to maximize pt's level of independence in the home and community environment.     Patient's spiritual, cultural, and educational needs considered and patient agreeable to plan of care and goals.           Plan: Continue with the plan of care to reduce pain and inflammation, restore ROM and improve shoulder stability. Will progress as tolerated.    Goals:   Active       LTG       Patient will report at least 20% disability reduction from initial Quick Dash score to indicate clinically significant functional improvement   (Progressing)       Start:  03/11/25    Expected End:  06/03/25            Patient will report at least 20% increase on initial FOTO score to indicate clinically significant functional improvement   (Progressing)       Start:  03/11/25    Expected End:  06/03/25            Patient will report 70% overall perceived improvement   (Progressing)       Start:  03/11/25    Expected End:  06/03/25            Patient will demonstrate independence with final HEP (Progressing)       Start:  03/11/25    Expected End:  06/03/25            Patient will demonstrate 80% of full active ROM of the right shoulder and report 2/10 pain or less (Progressing)       Start:  03/11/25    Expected End:  06/03/25               STG       Patient will report at least 10% disability reduction from initial Quick Dash score to indicate clinically significant functional improvement   (Progressing)       Start:  03/11/25    Expected End:  04/22/25            Patient will report at least 10% increase on initial FOTO score to indicate clinically significant functional improvement   (Progressing)       Start:  03/11/25    Expected End:  04/22/25            Patient will report 50% overall perceived improvement   (Progressing)       Start:  03/11/25    Expected End:  04/22/25            Patient will demonstrate independence with HEP (Progressing)       Start:  03/11/25    Expected End:  04/22/25            Patient will demonstrate  full passive ROM of the right shoulder and report 2/10 pain or less (Progressing)       Start:  03/11/25    Expected End:  04/22/25                Catrachito Quezada, PT

## 2025-04-01 ENCOUNTER — CLINICAL SUPPORT (OUTPATIENT)
Dept: REHABILITATION | Facility: HOSPITAL | Age: 65
End: 2025-04-01
Payer: MEDICARE

## 2025-04-01 DIAGNOSIS — M75.101 TEAR OF RIGHT SUPRASPINATUS TENDON: ICD-10-CM

## 2025-04-01 DIAGNOSIS — M25.511 RIGHT SHOULDER PAIN, UNSPECIFIED CHRONICITY: ICD-10-CM

## 2025-04-01 DIAGNOSIS — S46.001A INJURY OF RIGHT ROTATOR CUFF, INITIAL ENCOUNTER: Primary | ICD-10-CM

## 2025-04-01 PROCEDURE — 97530 THERAPEUTIC ACTIVITIES: CPT

## 2025-04-01 PROCEDURE — 97110 THERAPEUTIC EXERCISES: CPT

## 2025-04-01 PROCEDURE — 97014 ELECTRIC STIMULATION THERAPY: CPT

## 2025-04-01 PROCEDURE — 97010 HOT OR COLD PACKS THERAPY: CPT

## 2025-04-01 NOTE — PROGRESS NOTES
Outpatient Rehab    Physical Therapy Visit    Patient Name: Kimberly Agarwal  MRN: [id number]  YOB: 1960  Encounter Date: 4/1/2025    Therapy Diagnosis:   No diagnosis found.    Physician: Pranay Tsai MD    Physician Orders: Eval and Treat  Medical Diagnosis: Unspecified injury of muscle(s) and tendon(s) of the rotator cuff of right shoulder, initial encounter    Injury to the Right Rotator Cuff     Visit # / Visits Authorized:  visit #4 of 24  Date of Evaluation:  3/11/2025   Insurance Authorization Period: 3/11/2025 to 6/03/2025   Plan of Care Certification:  3/11/2025 to 6/03/2025     Time In: 0804   Time Out: 0922  Total Time: 78   Total Billable Time: 78    FOTO:  Therapist reviewed FOTO scores for Kimberly Agarwal on 3/11/2025.   FOTO report - see Media section or FOTO account episode details.      Intake: Patient's Physical FS Primary Measure:  29 (goal is  53@ visit # 15)  Intake:Risk Adjusted Statistical FOTO: 43  Intake:Limitation Score: 71%  Intake:Category: Carrying  Intake: DASH:  80.8% (higher score = greater disability)     Intake Score: 29 (53 predicted at visit 15)%       Subjective   Kimberly reports that her pain is better and she has more movement before the pain comes on. Using it more at home. No real pain at rest. Sleeping well..    0/10 pain at rest and 7/10 with ROM    Objective    Shoulder Range of Motion  Right Shoulder    Active (deg) Passive (deg) Pain   Flexion 75 170 Yes   Extension         Scaption 65 165 Yes   ABduction 60 160 Yes   ADduction         Horizontal ABduction         Horizontal ADduction         External Rotation (Shoulder ABducted 0 degrees) 55 70 Yes   External Rotation (Shoulder ABducted 45 degrees)         External Rotation (Shoulder ABducted 90 degrees)         Internal Rotation (Shoulder ABducted 0 degrees) 60 75 Yes   Internal Rotation (Shoulder ABducted 45 degrees)         Internal Rotation (Shoulder ABducted 90 degrees)                Shoulder, Elbow, or Forearm Range of Motion Details: Difficult to assess due to high irritability        Shoulder Strength - Planes of Motion    Right Strength Right Pain Left Strength Left  Pain   Flexion 3- Yes       Extension           ABduction 3- Yes       ADduction           Horizontal ABduction           Horizontal ADduction           Internal Rotation 0°           Internal Rotation 90° 4- Yes       External Rotation 0° 3+ Yes       External Rotation 90°                 Shoulder Strength - Rotator Cuff Muscles    Right Strength Right Pain Left Strength Left  Pain   Supraspinatus 2+ Yes       Infraspinatus 3- Yes       Teres Minor 4- Yes       Subscapularis 4- Yes          Shoulder Strength Details  Difficult to assess due to high irritability         Treatment:   Therapeutic Exercise  TE 1: 38 mins; pendulum's, pulley's, scap elevations, scap retractions, shoudler isometrics, active ER, active IR, gentle reaches  Therapeutic Activity  TA 1: 15 mins; NuStep push/pull  Modalities  Moist Heat (min): 10 mins; moist heat to the right shoulder pre ex  Electrical Stimulation (Parameters): 15 mins; IFC e-stim with cold pack to the the right shoulder    Time Entry(in minutes): 78  E-Stim (Unattended) Time Entry: 15 (IFC e-stim with cold pack post ex)  Hot/Cold Pack Time Entry: 10 (moist heat to the left shoulder pre ex)  Therapeutic Activity Time Entry: 15  Therapeutic Exercise Time Entry: 38    Assessment & Plan   Assessment: Continued with the plan of care. Shoulder less irritable overall. She is demonstrating some improved pain free ROM both passive and sctively. More functional use at home with activities.  Evaluation/Treatment Tolerance: Patient limited by pain    Patient will continue to benefit from skilled outpatient physical therapy to address the deficits listed in the problem list box on initial evaluation, provide pt/family education and to maximize pt's level of independence in the home and community  environment.     Patient's spiritual, cultural, and educational needs considered and patient agreeable to plan of care and goals.           Plan: Continue with the plan of care to reduce pain and inflammation, restore ROM and improve shoulder stability. Will progress as tolerated.    Goals:   Active       LTG       Patient will report at least 20% disability reduction from initial Quick Dash score to indicate clinically significant functional improvement   (Progressing)       Start:  03/11/25    Expected End:  06/03/25            Patient will report at least 20% increase on initial FOTO score to indicate clinically significant functional improvement   (Progressing)       Start:  03/11/25    Expected End:  06/03/25            Patient will report 70% overall perceived improvement   (Progressing)       Start:  03/11/25    Expected End:  06/03/25            Patient will demonstrate independence with final HEP (Progressing)       Start:  03/11/25    Expected End:  06/03/25            Patient will demonstrate 80% of full active ROM of the right shoulder and report 2/10 pain or less (Progressing)       Start:  03/11/25    Expected End:  06/03/25               STG       Patient will report at least 10% disability reduction from initial Quick Dash score to indicate clinically significant functional improvement   (Progressing)       Start:  03/11/25    Expected End:  04/22/25            Patient will report at least 10% increase on initial FOTO score to indicate clinically significant functional improvement   (Progressing)       Start:  03/11/25    Expected End:  04/22/25            Patient will report 50% overall perceived improvement   (Progressing)       Start:  03/11/25    Expected End:  04/22/25            Patient will demonstrate independence with HEP (Progressing)       Start:  03/11/25    Expected End:  04/22/25            Patient will demonstrate full passive ROM of the right shoulder and report 2/10 pain or less  (Progressing)       Start:  03/11/25    Expected End:  04/22/25                Catrachito Quezada PT

## 2025-04-03 ENCOUNTER — CLINICAL SUPPORT (OUTPATIENT)
Dept: REHABILITATION | Facility: HOSPITAL | Age: 65
End: 2025-04-03
Payer: MEDICARE

## 2025-04-03 DIAGNOSIS — M75.101 TEAR OF RIGHT SUPRASPINATUS TENDON: ICD-10-CM

## 2025-04-03 DIAGNOSIS — S46.001A INJURY OF RIGHT ROTATOR CUFF, INITIAL ENCOUNTER: Primary | ICD-10-CM

## 2025-04-03 DIAGNOSIS — M25.511 RIGHT SHOULDER PAIN, UNSPECIFIED CHRONICITY: ICD-10-CM

## 2025-04-03 PROCEDURE — 97110 THERAPEUTIC EXERCISES: CPT

## 2025-04-03 PROCEDURE — 97140 MANUAL THERAPY 1/> REGIONS: CPT

## 2025-04-03 PROCEDURE — 97010 HOT OR COLD PACKS THERAPY: CPT

## 2025-04-03 PROCEDURE — 97014 ELECTRIC STIMULATION THERAPY: CPT

## 2025-04-03 PROCEDURE — 97530 THERAPEUTIC ACTIVITIES: CPT

## 2025-04-03 NOTE — PROGRESS NOTES
Outpatient Rehab    Physical Therapy Visit    Patient Name: Kimberly Agarwal  MRN: [id number]  YOB: 1960  Encounter Date: 4/3/2025    Therapy Diagnosis:   Encounter Diagnoses   Name Primary?    Injury of right rotator cuff, initial encounter Yes    Right shoulder pain, unspecified chronicity     Tear of right supraspinatus tendon        Physician: Pranay Tsai MD    Physician Orders: Eval and Treat  Medical Diagnosis: Unspecified injury of muscle(s) and tendon(s) of the rotator cuff of right shoulder, initial encounter    Injury to the Right Rotator Cuff     Visit # / Visits Authorized:  visit #6 of 24  Date of Evaluation:  3/11/2025   Insurance Authorization Period: 3/11/2025 to 6/03/2025   Plan of Care Certification:  3/11/2025 to 6/03/2025     Time In: 0755   Time Out: 0923  Total Time: 88   Total Billable Time: 78    FOTO:  Therapist reviewed FOTO scores for Kimberly Agarwal on 3/11/2025.   FOTO report - see Media section or FOTO account episode details.      Intake: Patient's Physical FS Primary Measure:  29 (goal is  53@ visit # 15)  Intake:Risk Adjusted Statistical FOTO: 43  Intake:Limitation Score: 71%  Intake:Category: Carrying  Intake: DASH:  80.8% (higher score = greater disability)    4/03/2025: Patient's Physical FS Primary Measure:  35 (goal is  53@ visit # 15)  Intake: Risk Adjusted Statistical FOTO: 43  4/03/2025: Limitation Score: 65%  4/03/2025: Category: Carrying  4/03/2025: DASH:  73.3% (higher score = greater disability)     Intake Score: 29 (53 predicted at visit 15)%   Survey Score 2: 35    Subjective   Kimberly reports that she tripped over a clothes basket and strained her neck her pain is better and she has more movement before the pain comes on. Using it more at home. No real pain at rest. Sleeping well..    0/10 pain at rest and 7/10 with ROM    Objective    Shoulder Range of Motion  Right Shoulder    Active (deg) Passive (deg) Pain   Flexion 75 170 Yes   Extension          Scaption 65 165 Yes   ABduction 60 160 Yes   ADduction         Horizontal ABduction         Horizontal ADduction         External Rotation (Shoulder ABducted 0 degrees) 55 70 Yes   External Rotation (Shoulder ABducted 45 degrees)         External Rotation (Shoulder ABducted 90 degrees)         Internal Rotation (Shoulder ABducted 0 degrees) 60 75 Yes   Internal Rotation (Shoulder ABducted 45 degrees)         Internal Rotation (Shoulder ABducted 90 degrees)               Shoulder, Elbow, or Forearm Range of Motion Details: Difficult to assess due to high irritability        Shoulder Strength - Planes of Motion    Right Strength Right Pain Left Strength Left  Pain   Flexion 3- Yes       Extension           ABduction 3- Yes       ADduction           Horizontal ABduction           Horizontal ADduction           Internal Rotation 0°           Internal Rotation 90° 4- Yes       External Rotation 0° 3+ Yes       External Rotation 90°                 Shoulder Strength - Rotator Cuff Muscles    Right Strength Right Pain Left Strength Left  Pain   Supraspinatus 2+ Yes       Infraspinatus 3- Yes       Teres Minor 4- Yes       Subscapularis 4- Yes          Shoulder Strength Details  Difficult to assess due to high irritability         Treatment:   Therapeutic Exercise  TE 1: 38 mins; pendulum's, pulley's, scap elevations, scap retractions, shoudler isometrics, active ER, active IR, gentle reaches  Manual Therapy  MT 1: 10 mins; STM to right upper trap, passive cervical stretch, Biofreeze  Therapeutic Activity  TA 1: 15 mins; NuStep push/pull  Modalities  Moist Heat (min): 10 mins; moist heat to the right shoulder pre ex  Electrical Stimulation (Parameters): 15 mins; IFC e-stim with cold pack to the the right shoulder    Time Entry(in minutes): 88  E-Stim (Unattended) Time Entry: 15 (IFC e-stim with cold pack post ex)  Hot/Cold Pack Time Entry: 10 (moist heat to the left shoulder pre ex)  Manual Therapy Time Entry:  10  Therapeutic Activity Time Entry: 15  Therapeutic Exercise Time Entry: 38    Assessment & Plan   Assessment: Continued with the plan of care. Kimberly has made some improvement evident by her score on the functional outcome tool going from 71% limitation to 65% limitation. Shoulder less irritable overall. Demonstrated right upper trigger point that we were able to relieve with STM and passive cervical stretch. She is demonstrating some improved pain free ROM both passive and sctively. More functional use at home with activities.  Evaluation/Treatment Tolerance: Patient limited by pain    Patient will continue to benefit from skilled outpatient physical therapy to address the deficits listed in the problem list box on initial evaluation, provide pt/family education and to maximize pt's level of independence in the home and community environment.     Patient's spiritual, cultural, and educational needs considered and patient agreeable to plan of care and goals.           Plan: Continue with the plan of care to reduce pain and inflammation, restore ROM and improve shoulder stability. Will progress as tolerated.    Goals:   Active       LTG       Patient will report at least 20% disability reduction from initial Quick Dash score to indicate clinically significant functional improvement   (Progressing)       Start:  03/11/25    Expected End:  06/03/25            Patient will report at least 20% increase on initial FOTO score to indicate clinically significant functional improvement   (Progressing)       Start:  03/11/25    Expected End:  06/03/25            Patient will report 70% overall perceived improvement   (Progressing)       Start:  03/11/25    Expected End:  06/03/25            Patient will demonstrate independence with final HEP (Progressing)       Start:  03/11/25    Expected End:  06/03/25            Patient will demonstrate 80% of full active ROM of the right shoulder and report 2/10 pain or less  (Progressing)       Start:  03/11/25    Expected End:  06/03/25               STG       Patient will report at least 10% disability reduction from initial Quick Dash score to indicate clinically significant functional improvement   (Progressing)       Start:  03/11/25    Expected End:  04/22/25            Patient will report at least 10% increase on initial FOTO score to indicate clinically significant functional improvement   (Progressing)       Start:  03/11/25    Expected End:  04/22/25            Patient will report 50% overall perceived improvement   (Progressing)       Start:  03/11/25    Expected End:  04/22/25            Patient will demonstrate independence with HEP (Progressing)       Start:  03/11/25    Expected End:  04/22/25            Patient will demonstrate full passive ROM of the right shoulder and report 2/10 pain or less (Progressing)       Start:  03/11/25    Expected End:  04/22/25                Catrachito Quezada, PT

## 2025-04-06 DIAGNOSIS — I10 ESSENTIAL HYPERTENSION: ICD-10-CM

## 2025-04-06 DIAGNOSIS — E87.6 HYPOKALEMIA: ICD-10-CM

## 2025-04-07 RX ORDER — POTASSIUM CHLORIDE 20 MEQ/1
20 TABLET, EXTENDED RELEASE ORAL 2 TIMES DAILY
Qty: 180 TABLET | Refills: 3 | Status: SHIPPED | OUTPATIENT
Start: 2025-04-07

## 2025-04-07 RX ORDER — LOSARTAN POTASSIUM AND HYDROCHLOROTHIAZIDE 12.5; 5 MG/1; MG/1
1 TABLET ORAL EVERY MORNING
Qty: 90 TABLET | Refills: 3 | Status: SHIPPED | OUTPATIENT
Start: 2025-04-07

## 2025-04-08 ENCOUNTER — CLINICAL SUPPORT (OUTPATIENT)
Dept: REHABILITATION | Facility: HOSPITAL | Age: 65
End: 2025-04-08
Payer: MEDICARE

## 2025-04-08 DIAGNOSIS — S46.001A INJURY OF RIGHT ROTATOR CUFF, INITIAL ENCOUNTER: Primary | ICD-10-CM

## 2025-04-08 DIAGNOSIS — M75.101 TEAR OF RIGHT SUPRASPINATUS TENDON: ICD-10-CM

## 2025-04-08 DIAGNOSIS — M25.511 RIGHT SHOULDER PAIN, UNSPECIFIED CHRONICITY: ICD-10-CM

## 2025-04-08 PROCEDURE — 97010 HOT OR COLD PACKS THERAPY: CPT

## 2025-04-08 PROCEDURE — 97530 THERAPEUTIC ACTIVITIES: CPT

## 2025-04-08 PROCEDURE — 97014 ELECTRIC STIMULATION THERAPY: CPT

## 2025-04-08 PROCEDURE — 97110 THERAPEUTIC EXERCISES: CPT

## 2025-04-08 NOTE — PROGRESS NOTES
Outpatient Rehab    Physical Therapy Visit    Patient Name: Kimberly Agarwal  MRN: [id number]  YOB: 1960  Encounter Date: 4/8/2025    Therapy Diagnosis:   Encounter Diagnoses   Name Primary?    Injury of right rotator cuff, initial encounter Yes    Right shoulder pain, unspecified chronicity     Tear of right supraspinatus tendon        Physician: Pranay Tsai MD    Physician Orders: Eval and Treat  Medical Diagnosis: Unspecified injury of muscle(s) and tendon(s) of the rotator cuff of right shoulder, initial encounter    Injury to the Right Rotator Cuff     Visit # / Visits Authorized:  visit #6 of 24  Date of Evaluation:  3/11/2025   Insurance Authorization Period: 3/11/2025 to 6/03/2025   Plan of Care Certification:  3/11/2025 to 6/03/2025     Time In: 0757   Time Out: 0925  Total Time: 88   Total Billable Time: 78    FOTO:  Therapist reviewed FOTO scores for Kimberly Agarwal on 3/11/2025.   FOTO report - see Media section or FOTO account episode details.      Intake: Patient's Physical FS Primary Measure:  29 (goal is  53@ visit # 15)  Intake:Risk Adjusted Statistical FOTO: 43  Intake:Limitation Score: 71%  Intake:Category: Carrying  Intake: DASH:  80.8% (higher score = greater disability)    4/03/2025: Patient's Physical FS Primary Measure:  35 (goal is  53@ visit # 15)  Intake: Risk Adjusted Statistical FOTO: 43  4/03/2025: Limitation Score: 65%  4/03/2025: Category: Carrying  4/03/2025: DASH:  73.3% (higher score = greater disability)     Intake Score: 29 (53 predicted at visit 15)%   Survey Score 2: 35    Subjective   Kimberly reports that her shoulder is sore but has been doing more with it. No real pain at rest. Sleeping well..  Pain reported as 4/10. 0/10 pain at rest and 7/10 with ROM    Objective    Shoulder Range of Motion  Right Shoulder    Active (deg) Passive (deg) Pain   Flexion 75 170 Yes   Extension         Scaption 65 165 Yes   ABduction 60 160 Yes   ADduction          Horizontal ABduction         Horizontal ADduction         External Rotation (Shoulder ABducted 0 degrees) 55 70 Yes   External Rotation (Shoulder ABducted 45 degrees)         External Rotation (Shoulder ABducted 90 degrees)         Internal Rotation (Shoulder ABducted 0 degrees) 60 75 Yes   Internal Rotation (Shoulder ABducted 45 degrees)         Internal Rotation (Shoulder ABducted 90 degrees)               Shoulder, Elbow, or Forearm Range of Motion Details: Difficult to assess due to high irritability        Shoulder Strength - Planes of Motion    Right Strength Right Pain Left Strength Left  Pain   Flexion 3- Yes       Extension           ABduction 3- Yes       ADduction           Horizontal ABduction           Horizontal ADduction           Internal Rotation 0°           Internal Rotation 90° 4- Yes       External Rotation 0° 3+ Yes       External Rotation 90°                 Shoulder Strength - Rotator Cuff Muscles    Right Strength Right Pain Left Strength Left  Pain   Supraspinatus 2+ Yes       Infraspinatus 3- Yes       Teres Minor 4- Yes       Subscapularis 4- Yes          Shoulder Strength Details  Difficult to assess due to high irritability         Treatment:   Therapeutic Exercise  TE 1: 38 mins; pendulum's, pulley's, scap elevations, scap retractions, shoudler isometrics, active ER, active IR, gentle reaches  Manual Therapy  MT 1: 10 mins; STM to right upper trap, passive cervical stretch, Biofreeze  Therapeutic Activity  TA 1: 15 mins; NuStep push/pull  Modalities  Moist Heat (min): 10 mins; moist heat to the right shoulder pre ex  Electrical Stimulation (Parameters): 15 mins; IFC e-stim with cold pack to the the right shoulder    Time Entry(in minutes): 88  E-Stim (Unattended) Time Entry: 15 (IFC e-stim with cold pack post ex)  Hot/Cold Pack Time Entry: 10 (moist heat to the left shoulder pre ex)  Therapeutic Activity Time Entry: 15  Therapeutic Exercise Time Entry: 38    Assessment & Plan    Assessment: Continued with the plan of care. Kimberly has made some improvement evident by her score on the functional outcome tool going from 71% limitation to 65% limitation. Shoulder less irritable overall. Demonstrated right upper trigger point that we were able to relieve with STM and passive cervical stretch. She is demonstrating some improved pain free ROM both passive and actively. More functional use at home with activities.  Evaluation/Treatment Tolerance: Patient tolerated treatment well    Patient will continue to benefit from skilled outpatient physical therapy to address the deficits listed in the problem list box on initial evaluation, provide pt/family education and to maximize pt's level of independence in the home and community environment.     Patient's spiritual, cultural, and educational needs considered and patient agreeable to plan of care and goals.           Plan: Continue with the plan of care to reduce pain and inflammation, restore ROM and improve shoulder stability. Will progress as tolerated.    Goals:   Active       LTG       Patient will report at least 20% disability reduction from initial Quick Dash score to indicate clinically significant functional improvement   (Progressing)       Start:  03/11/25    Expected End:  06/03/25            Patient will report at least 20% increase on initial FOTO score to indicate clinically significant functional improvement   (Progressing)       Start:  03/11/25    Expected End:  06/03/25            Patient will report 70% overall perceived improvement   (Progressing)       Start:  03/11/25    Expected End:  06/03/25            Patient will demonstrate independence with final HEP (Progressing)       Start:  03/11/25    Expected End:  06/03/25            Patient will demonstrate 80% of full active ROM of the right shoulder and report 2/10 pain or less (Progressing)       Start:  03/11/25    Expected End:  06/03/25               STG       Patient  will report at least 10% disability reduction from initial Quick Dash score to indicate clinically significant functional improvement   (Progressing)       Start:  03/11/25    Expected End:  04/22/25            Patient will report at least 10% increase on initial FOTO score to indicate clinically significant functional improvement   (Progressing)       Start:  03/11/25    Expected End:  04/22/25            Patient will report 50% overall perceived improvement   (Progressing)       Start:  03/11/25    Expected End:  04/22/25            Patient will demonstrate independence with HEP (Progressing)       Start:  03/11/25    Expected End:  04/22/25            Patient will demonstrate full passive ROM of the right shoulder and report 2/10 pain or less (Progressing)       Start:  03/11/25    Expected End:  04/22/25                Catrachito Quezada PT

## 2025-04-09 DIAGNOSIS — I10 ESSENTIAL HYPERTENSION: ICD-10-CM

## 2025-04-09 RX ORDER — FUROSEMIDE 40 MG/1
40 TABLET ORAL 2 TIMES DAILY
Qty: 180 TABLET | Refills: 3 | Status: SHIPPED | OUTPATIENT
Start: 2025-04-09

## 2025-04-10 ENCOUNTER — CLINICAL SUPPORT (OUTPATIENT)
Dept: REHABILITATION | Facility: HOSPITAL | Age: 65
End: 2025-04-10
Payer: MEDICARE

## 2025-04-10 DIAGNOSIS — M25.511 RIGHT SHOULDER PAIN, UNSPECIFIED CHRONICITY: ICD-10-CM

## 2025-04-10 DIAGNOSIS — S46.001A INJURY OF RIGHT ROTATOR CUFF, INITIAL ENCOUNTER: Primary | ICD-10-CM

## 2025-04-10 DIAGNOSIS — M75.101 TEAR OF RIGHT SUPRASPINATUS TENDON: ICD-10-CM

## 2025-04-10 PROCEDURE — 97140 MANUAL THERAPY 1/> REGIONS: CPT

## 2025-04-10 PROCEDURE — 97010 HOT OR COLD PACKS THERAPY: CPT

## 2025-04-10 PROCEDURE — 97014 ELECTRIC STIMULATION THERAPY: CPT

## 2025-04-10 PROCEDURE — 97530 THERAPEUTIC ACTIVITIES: CPT

## 2025-04-10 PROCEDURE — 97110 THERAPEUTIC EXERCISES: CPT

## 2025-04-10 NOTE — PROGRESS NOTES
`  Outpatient Rehab    Physical Therapy Visit    Patient Name: Kimberly Agarwal  MRN: [id number]  YOB: 1960  Encounter Date: 4/10/2025    Therapy Diagnosis:   Encounter Diagnoses   Name Primary?    Injury of right rotator cuff, initial encounter Yes    Right shoulder pain, unspecified chronicity     Tear of right supraspinatus tendon        Physician: Pranay Tsai MD    Physician Orders: Eval and Treat  Medical Diagnosis: Unspecified injury of muscle(s) and tendon(s) of the rotator cuff of right shoulder, initial encounter    Injury to the Right Rotator Cuff     Visit # / Visits Authorized:  visit #6 of 24  Date of Evaluation:  3/11/2025   Insurance Authorization Period: 3/11/2025 to 6/03/2025   Plan of Care Certification:  3/11/2025 to 6/03/2025     Time In: 0758   Time Out: 0926  Total Time: 88   Total Billable Time: 78    FOTO:  Therapist reviewed FOTO scores for Kimberly Agarwal on 3/11/2025.   FOTO report - see Media section or FOTO account episode details.      Intake: Patient's Physical FS Primary Measure:  29 (goal is  53@ visit # 15)  Intake:Risk Adjusted Statistical FOTO: 43  Intake:Limitation Score: 71%  Intake:Category: Carrying  Intake: DASH:  80.8% (higher score = greater disability)    4/03/2025: Patient's Physical FS Primary Measure:  35 (goal is  53@ visit # 15)  Intake: Risk Adjusted Statistical FOTO: 43  4/03/2025: Limitation Score: 65%  4/03/2025: Category: Carrying  4/03/2025: DASH:  73.3% (higher score = greater disability)     Intake Score: 29 (53 predicted at visit 15)%   Survey Score 2: 35    Subjective   Kimberly reports that her shoulder is hurting a little more today and her right knee is giving her a lot of trouble after having to walk quite a bit yeaterday at the hospital following her brother's surgery..  Pain reported as 5/10.      Objective    Shoulder Range of Motion  Right Shoulder    Active (deg) Passive (deg) Pain   Flexion 75 170 Yes   Extension          Scaption 65 165 Yes   ABduction 60 160 Yes   ADduction         Horizontal ABduction         Horizontal ADduction         External Rotation (Shoulder ABducted 0 degrees) 55 70 Yes   External Rotation (Shoulder ABducted 45 degrees)         External Rotation (Shoulder ABducted 90 degrees)         Internal Rotation (Shoulder ABducted 0 degrees) 60 75 Yes   Internal Rotation (Shoulder ABducted 45 degrees)         Internal Rotation (Shoulder ABducted 90 degrees)               Shoulder, Elbow, or Forearm Range of Motion Details: Difficult to assess due to high irritability        Shoulder Strength - Planes of Motion    Right Strength Right Pain Left Strength Left  Pain   Flexion 3- Yes       Extension           ABduction 3- Yes       ADduction           Horizontal ABduction           Horizontal ADduction           Internal Rotation 0°           Internal Rotation 90° 4- Yes       External Rotation 0° 3+ Yes       External Rotation 90°                 Shoulder Strength - Rotator Cuff Muscles    Right Strength Right Pain Left Strength Left  Pain   Supraspinatus 2+ Yes       Infraspinatus 3- Yes       Teres Minor 4- Yes       Subscapularis 4- Yes          Shoulder Strength Details  Difficult to assess due to high irritability         Treatment:   Therapeutic Exercise  TE 1: 38 mins; pendulum's, pulley's, scap elevations, scap retractions, shoudler isometrics, active ER, active IR, gentle reaches  Manual Therapy  MT 1: 10 mins; STM to right upper trap, passive cervical stretch, Biofreeze  Therapeutic Activity  TA 1: 15 mins; NuStep push/pull  Modalities  Moist Heat (min): 10 mins; moist heat to the right shoulder pre ex  Electrical Stimulation (Parameters): 15 mins; IFC e-stim with cold pack to the the right shoulder    Time Entry(in minutes): 88  E-Stim (Unattended) Time Entry: 15 (IFC e-stim with cold pack post ex)  Hot/Cold Pack Time Entry: 10 (moist heat to the left shoulder pre ex)  Manual Therapy Time Entry:  10  Therapeutic Activity Time Entry: 15  Therapeutic Exercise Time Entry: 38    Assessment & Plan   Assessment: Continued with the plan of care. Kimberly has made some improvement evident by her score on the functional outcome tool going from 71% limitation to 65% limitation. Shoulder less irritable overall but today found to be a little higher level. Right upper trap trigger point appears to be resolved.  She is demonstrating some improved pain free ROM both passive and actively but especially passively. More functional use at home with activities.  Evaluation/Treatment Tolerance: Patient tolerated treatment well    Patient will continue to benefit from skilled outpatient physical therapy to address the deficits listed in the problem list box on initial evaluation, provide pt/family education and to maximize pt's level of independence in the home and community environment.     Patient's spiritual, cultural, and educational needs considered and patient agreeable to plan of care and goals.           Plan: Continue with the plan of care to reduce pain and inflammation, restore ROM and improve shoulder stability. Will progress as tolerated.    Goals:   Active       LTG       Patient will report at least 20% disability reduction from initial Quick Dash score to indicate clinically significant functional improvement   (Progressing)       Start:  03/11/25    Expected End:  06/03/25            Patient will report at least 20% increase on initial FOTO score to indicate clinically significant functional improvement   (Progressing)       Start:  03/11/25    Expected End:  06/03/25            Patient will report 70% overall perceived improvement   (Progressing)       Start:  03/11/25    Expected End:  06/03/25            Patient will demonstrate independence with final HEP (Progressing)       Start:  03/11/25    Expected End:  06/03/25            Patient will demonstrate 80% of full active ROM of the right shoulder and report  2/10 pain or less (Progressing)       Start:  03/11/25    Expected End:  06/03/25               STG       Patient will report at least 10% disability reduction from initial Quick Dash score to indicate clinically significant functional improvement   (Progressing)       Start:  03/11/25    Expected End:  04/22/25            Patient will report at least 10% increase on initial FOTO score to indicate clinically significant functional improvement   (Progressing)       Start:  03/11/25    Expected End:  04/22/25            Patient will report 50% overall perceived improvement   (Progressing)       Start:  03/11/25    Expected End:  04/22/25            Patient will demonstrate independence with HEP (Progressing)       Start:  03/11/25    Expected End:  04/22/25            Patient will demonstrate full passive ROM of the right shoulder and report 2/10 pain or less (Progressing)       Start:  03/11/25    Expected End:  04/22/25                Catrachito Quezada PT

## 2025-04-15 ENCOUNTER — CLINICAL SUPPORT (OUTPATIENT)
Dept: REHABILITATION | Facility: HOSPITAL | Age: 65
End: 2025-04-15
Payer: MEDICARE

## 2025-04-15 DIAGNOSIS — M75.101 TEAR OF RIGHT SUPRASPINATUS TENDON: ICD-10-CM

## 2025-04-15 DIAGNOSIS — M25.511 RIGHT SHOULDER PAIN, UNSPECIFIED CHRONICITY: ICD-10-CM

## 2025-04-15 DIAGNOSIS — S46.001A INJURY OF RIGHT ROTATOR CUFF, INITIAL ENCOUNTER: Primary | ICD-10-CM

## 2025-04-15 PROCEDURE — 97010 HOT OR COLD PACKS THERAPY: CPT

## 2025-04-15 PROCEDURE — 97530 THERAPEUTIC ACTIVITIES: CPT

## 2025-04-15 PROCEDURE — 97014 ELECTRIC STIMULATION THERAPY: CPT

## 2025-04-15 PROCEDURE — 97140 MANUAL THERAPY 1/> REGIONS: CPT

## 2025-04-15 PROCEDURE — 97110 THERAPEUTIC EXERCISES: CPT

## 2025-04-15 NOTE — PROGRESS NOTES
Outpatient Rehab    Physical Therapy Progress Note    Patient Name: Kimberly gAarwal  MRN: [id number]  YOB: 1960  Encounter Date: 4/15/2025    Therapy Diagnosis:   Encounter Diagnoses   Name Primary?    Injury of right rotator cuff, initial encounter Yes    Right shoulder pain, unspecified chronicity     Tear of right supraspinatus tendon      Physician: Pranay Tsai MD    Physician Orders: Eval and Treat  Medical Diagnosis: Unspecified injury of muscle(s) and tendon(s) of the rotator cuff of right shoulder, initial encounter    Visit # / Visits Authorized:  9 / 24  Insurance Authorization Period: 3/11/2025 to 6/3/2025  Date of Evaluation: 3/11/2025   Plan of Care Certification: 3/11/2025 to 6/3/2025     Time In: 0803   Time Out: 0931  Total Time: 88   Total Billable Time: 88      FOTO:  Therapist reviewed FOTO scores for Kimberly Agarwal on 3/11/2025.   FOTO report - see Media section or FOTO account episode details.      Intake: Patient's Physical FS Primary Measure:  29 (goal is  53@ visit # 15)  Intake:Risk Adjusted Statistical FOTO: 43  Intake:Limitation Score: 71%  Intake:Category: Carrying  Intake: DASH:  80.8% (higher score = greater disability)    4/03/2025: Patient's Physical FS Primary Measure:  35 (goal is  53@ visit # 15)  Intake: Risk Adjusted Statistical FOTO: 43  4/03/2025: Limitation Score: 65%  4/03/2025: Category: Carrying  4/03/2025: DASH:  73.3% (higher score = greater disability)     Intake Score: 29 (53 predicted at visit 15)%   Survey Score 2: 35       Subjective   Kimberly reports that her shoulder is hurting a little more today.  She has beencleaning out her brother's house who recently passed away..  Pain reported as 2/10.      Objective      Shoulder Range of Motion  Right Shoulder   Active (deg) Passive (deg) Pain   Flexion 145 175 Yes   Extension         Scaption 155 170 Yes   ABduction 155 165 Yes   ADduction         Horizontal ABduction         Horizontal  ADduction         External Rotation (Shoulder ABducted 0 degrees) 65 75 Yes   External Rotation (Shoulder ABducted 45 degrees)         External Rotation (Shoulder ABducted 90 degrees)         Internal Rotation (Shoulder ABducted 0 degrees) 70 80 Yes   Internal Rotation (Shoulder ABducted 45 degrees)         Internal Rotation (Shoulder ABducted 90 degrees)                       Shoulder Strength - Planes of Motion   Right Strength Right Pain Left Strength Left  Pain   Flexion 4- Yes       Extension           ABduction 4- Yes       ADduction           Horizontal ABduction           Horizontal ADduction           Internal Rotation 0°           Internal Rotation 90° 4- Yes       External Rotation 0° 3+ Yes       External Rotation 90°                           Treatment:  Therapeutic Exercise  TE 1: 38 mins; pendulum's, pulley's, scap elevations, scap retractions, shoudler isometrics, active ER, active IR, gentle reaches  Manual Therapy  MT 1: 10 mins; STM to right upper trap, passive cervical stretch, Biofreeze  Therapeutic Activity  TA 1: 15 mins; NuStep push/pull  Modalities  Moist Heat (min): 10 mins; moist heat to the right shoulder pre ex  Electrical Stimulation (Parameters): 15 mins; IFC e-stim with cold pack to the the right shoulder    Time Entry(in minutes): 88  E-Stim (Unattended) Time Entry: 15 (IFC e-stim with cold pack post ex)  Hot/Cold Pack Time Entry: 10 (moist heat to the left shoulder pre ex)  Manual Therapy Time Entry: 10  Therapeutic Activity Time Entry: 15  Therapeutic Exercise Time Entry: 38    Assessment & Plan   Assessment: Continued with the plan of care. Kimberly has made some improvement evident by her score on the functional outcome tool going from 71% limitation to 65% limitation. Measures demonstrate improved active and passive shoulder ROM with improved strength. The shoulder is less irritable overall but today found to be a little higher level. Right upper trap trigger point appears to be  resolved.  She is demonstrating some improved pain free ROM both passive and actively but especially passively. More functional use at home with activities.  Evaluation/Treatment Tolerance: Patient tolerated treatment well    Patient will continue to benefit from skilled outpatient physical therapy to address the deficits listed in the problem list box on initial evaluation, provide pt/family education and to maximize pt's level of independence in the home and community environment.     Patient's spiritual, cultural, and educational needs considered and patient agreeable to plan of care and goals.           Plan: Continue with the plan of care to reduce pain and inflammation, restore ROM and improve shoulder stability. Will progress as tolerated.    Goals:   Active       LTG       Patient will report at least 20% disability reduction from initial Quick Dash score to indicate clinically significant functional improvement   (Progressing)       Start:  03/11/25    Expected End:  06/03/25            Patient will report at least 20% increase on initial FOTO score to indicate clinically significant functional improvement   (Progressing)       Start:  03/11/25    Expected End:  06/03/25            Patient will report 70% overall perceived improvement   (Progressing)       Start:  03/11/25    Expected End:  06/03/25            Patient will demonstrate independence with final HEP (Progressing)       Start:  03/11/25    Expected End:  06/03/25            Patient will demonstrate 80% of full active ROM of the right shoulder and report 2/10 pain or less (Progressing)       Start:  03/11/25    Expected End:  06/03/25               STG       Patient will report at least 10% disability reduction from initial Quick Dash score to indicate clinically significant functional improvement   (Met)       Start:  03/11/25    Expected End:  04/22/25    Resolved:  04/15/25         Patient will report at least 10% increase on initial FOTO  score to indicate clinically significant functional improvement   (Met)       Start:  03/11/25    Expected End:  04/22/25    Resolved:  04/15/25         Patient will report 50% overall perceived improvement   (Progressing)       Start:  03/11/25    Expected End:  04/22/25            Patient will demonstrate independence with HEP (Met)       Start:  03/11/25    Expected End:  04/22/25    Resolved:  04/15/25         Patient will demonstrate full passive ROM of the right shoulder and report 2/10 pain or less (Progressing)       Start:  03/11/25    Expected End:  04/22/25                Catrachito Quezada PT

## 2025-04-17 ENCOUNTER — CLINICAL SUPPORT (OUTPATIENT)
Dept: REHABILITATION | Facility: HOSPITAL | Age: 65
End: 2025-04-17
Payer: MEDICARE

## 2025-04-17 DIAGNOSIS — S46.001A INJURY OF RIGHT ROTATOR CUFF, INITIAL ENCOUNTER: Primary | ICD-10-CM

## 2025-04-17 DIAGNOSIS — M75.101 TEAR OF RIGHT SUPRASPINATUS TENDON: ICD-10-CM

## 2025-04-17 DIAGNOSIS — M25.511 RIGHT SHOULDER PAIN, UNSPECIFIED CHRONICITY: ICD-10-CM

## 2025-04-17 PROCEDURE — 97110 THERAPEUTIC EXERCISES: CPT

## 2025-04-17 PROCEDURE — 97010 HOT OR COLD PACKS THERAPY: CPT

## 2025-04-17 PROCEDURE — 97140 MANUAL THERAPY 1/> REGIONS: CPT

## 2025-04-17 PROCEDURE — 97014 ELECTRIC STIMULATION THERAPY: CPT

## 2025-04-17 PROCEDURE — 97530 THERAPEUTIC ACTIVITIES: CPT

## 2025-04-17 NOTE — PROGRESS NOTES
Outpatient Rehab    Physical Therapy Progress Note    Patient Name: Kimberly Agarwal  MRN: [id number]  YOB: 1960  Encounter Date: 4/17/2025    Therapy Diagnosis:   Encounter Diagnoses   Name Primary?    Injury of right rotator cuff, initial encounter Yes    Right shoulder pain, unspecified chronicity     Tear of right supraspinatus tendon      Physician: Pranay Tsai MD    Physician Orders: Eval and Treat  Medical Diagnosis: Unspecified injury of muscle(s) and tendon(s) of the rotator cuff of right shoulder, initial encounter    Visit # / Visits Authorized:  10 / 24  Insurance Authorization Period: 3/11/2025 to 6/3/2025  Date of Evaluation: 3/11/2025   Plan of Care Certification: 3/11/2025 to 6/3/2025     Time In: 0801   Time Out: 0929  Total Time: 88   Total Billable Time: 88      FOTO:  Therapist reviewed FOTO scores for Kimberly Agarwal on 3/11/2025.   FOTO report - see Media section or FOTO account episode details.      Intake: Patient's Physical FS Primary Measure:  29 (goal is  53@ visit # 15)  Intake:Risk Adjusted Statistical FOTO: 43  Intake:Limitation Score: 71%  Intake:Category: Carrying  Intake: DASH:  80.8% (higher score = greater disability)    4/03/2025: Patient's Physical FS Primary Measure:  35 (goal is  53@ visit # 15)  Intake: Risk Adjusted Statistical FOTO: 43  4/03/2025: Limitation Score: 65%  4/03/2025: Category: Carrying  4/03/2025: DASH:  73.3% (higher score = greater disability)     Intake Score: 29 (53 predicted at visit 15)%   Survey Score 2: 35       Subjective   Kimberly reports that her shoulder is not too bad today but has not done much with it this morning. She feels there is 50% overall improvement withs the shoulder. Knees hurting after having to do a lot of walking yesterday..  Pain reported as 0/10. 0/10 pain in the shoulder at rest; 6/10 with ROM    Objective    Shoulder Range of Motion  Right Shoulder    Active (deg) Passive (deg) Pain   Flexion 145 175  Yes   Extension         Scaption 155 170 Yes   ABduction 155 165 Yes   ADduction         Horizontal ABduction         Horizontal ADduction         External Rotation (Shoulder ABducted 0 degrees) 65 75 Yes   External Rotation (Shoulder ABducted 45 degrees)         External Rotation (Shoulder ABducted 90 degrees)         Internal Rotation (Shoulder ABducted 0 degrees) 70 80 Yes   Internal Rotation (Shoulder ABducted 45 degrees)         Internal Rotation (Shoulder ABducted 90 degrees)                    Treatment:  Therapeutic Exercise  TE 1: 38 mins; pendulum's, pulley's, scap elevations, scap retractions, shoudler isometrics, active ER, active IR, gentle reaches  Manual Therapy  MT 1: 10 mins; STM to right upper trap, passive cervical stretch, Biofreeze  Therapeutic Activity  TA 1: 15 mins; NuStep push/pull  Modalities  Moist Heat (min): 10 mins; moist heat to the right shoulder pre ex  Electrical Stimulation (Parameters): 15 mins; IFC e-stim with cold pack to the the right shoulder    Time Entry(in minutes): 88  E-Stim (Unattended) Time Entry: 15 (IFC e-stim with cold pack post ex)  Hot/Cold Pack Time Entry: 10 (moist heat to the left shoulder pre ex)  Manual Therapy Time Entry: 10  Therapeutic Activity Time Entry: 15  Therapeutic Exercise Time Entry: 38    Assessment & Plan   Assessment: Continued with the plan of care. Kimberly has made some improvement evident by her score on the functional outcome tool going from 71% limitation to 65% limitation. Measures demonstrate improved active and passive shoulder ROM with improved strength. She has met 2 more STG's. The shoulder is less irritable overall allowing for improved active motion. There is improved pain free ROM both passive and actively. She is getting full front and side elevation on the pulley's. More functional use at home with activities.  Evaluation/Treatment Tolerance: Patient tolerated treatment well    Patient will continue to benefit from skilled  outpatient physical therapy to address the deficits listed in the problem list box on initial evaluation, provide pt/family education and to maximize pt's level of independence in the home and community environment.     Patient's spiritual, cultural, and educational needs considered and patient agreeable to plan of care and goals.           Plan: Continue with the plan of care to reduce pain and inflammation, restore ROM and improve shoulder stability. Will progress as tolerated.    Goals:   Active       LTG       Patient will report at least 20% disability reduction from initial Quick Dash score to indicate clinically significant functional improvement   (Progressing)       Start:  03/11/25    Expected End:  06/03/25            Patient will report at least 20% increase on initial FOTO score to indicate clinically significant functional improvement   (Progressing)       Start:  03/11/25    Expected End:  06/03/25            Patient will report 70% overall perceived improvement   (Progressing)       Start:  03/11/25    Expected End:  06/03/25            Patient will demonstrate independence with final HEP (Progressing)       Start:  03/11/25    Expected End:  06/03/25            Patient will demonstrate 80% of full active ROM of the right shoulder and report 2/10 pain or less (Progressing)       Start:  03/11/25    Expected End:  06/03/25              Resolved       STG       Patient will report at least 10% disability reduction from initial Quick Dash score to indicate clinically significant functional improvement   (Met)       Start:  03/11/25    Expected End:  04/22/25    Resolved:  04/15/25         Patient will report at least 10% increase on initial FOTO score to indicate clinically significant functional improvement   (Met)       Start:  03/11/25    Expected End:  04/22/25    Resolved:  04/15/25         Patient will report 50% overall perceived improvement   (Met)       Start:  03/11/25    Expected End:   04/22/25    Resolved:  04/17/25         Patient will demonstrate independence with HEP (Met)       Start:  03/11/25    Expected End:  04/22/25    Resolved:  04/15/25         Patient will demonstrate full passive ROM of the right shoulder and report 2/10 pain or less (Met)       Start:  03/11/25    Expected End:  04/22/25    Resolved:  04/17/25             Catrachito Quezada PT

## 2025-04-22 ENCOUNTER — CLINICAL SUPPORT (OUTPATIENT)
Dept: REHABILITATION | Facility: HOSPITAL | Age: 65
End: 2025-04-22
Payer: MEDICARE

## 2025-04-22 DIAGNOSIS — M75.101 TEAR OF RIGHT SUPRASPINATUS TENDON: ICD-10-CM

## 2025-04-22 DIAGNOSIS — S46.001A INJURY OF RIGHT ROTATOR CUFF, INITIAL ENCOUNTER: Primary | ICD-10-CM

## 2025-04-22 DIAGNOSIS — M25.511 RIGHT SHOULDER PAIN, UNSPECIFIED CHRONICITY: ICD-10-CM

## 2025-04-22 PROCEDURE — 97010 HOT OR COLD PACKS THERAPY: CPT

## 2025-04-22 PROCEDURE — 97014 ELECTRIC STIMULATION THERAPY: CPT

## 2025-04-22 PROCEDURE — 97140 MANUAL THERAPY 1/> REGIONS: CPT

## 2025-04-22 PROCEDURE — 97530 THERAPEUTIC ACTIVITIES: CPT

## 2025-04-22 PROCEDURE — 97110 THERAPEUTIC EXERCISES: CPT

## 2025-04-22 NOTE — PROGRESS NOTES
Outpatient Rehab    Physical Therapy Progress Note    Patient Name: Kimberly Agarwal  MRN: [id number]  YOB: 1960  Encounter Date: 4/22/2025    Therapy Diagnosis:   Encounter Diagnoses   Name Primary?    Injury of right rotator cuff, initial encounter Yes    Right shoulder pain, unspecified chronicity     Tear of right supraspinatus tendon      Physician: Pranay Tsai MD    Physician Orders: Eval and Treat  Medical Diagnosis: Unspecified injury of muscle(s) and tendon(s) of the rotator cuff of right shoulder, initial encounter    Visit # / Visits Authorized:  11 / 24  Insurance Authorization Period: 3/11/2025 to 6/3/2025  Date of Evaluation: 3/11/2025   Plan of Care Certification: 3/11/2025 to 6/3/2025     Time In: 0803   Time Out: 0931  Total Time: 88   Total Billable Time: 88      FOTO:  Therapist reviewed FOTO scores for Kimberly Agarwal on 3/11/2025.   FOTO report - see Media section or FOTO account episode details.      Intake: Patient's Physical FS Primary Measure:  29 (goal is  53@ visit # 15)  Intake:Risk Adjusted Statistical FOTO: 43  Intake:Limitation Score: 71%  Intake:Category: Carrying  Intake: DASH:  80.8% (higher score = greater disability)    4/03/2025: Patient's Physical FS Primary Measure:  35 (goal is  53@ visit # 15)  Intake: Risk Adjusted Statistical FOTO: 43  4/03/2025: Limitation Score: 65%  4/03/2025: Category: Carrying  4/03/2025: DASH:  73.3% (higher score = greater disability)     Intake Score: 29 (53 predicted at visit 15)%   Survey Score 2: 35       Subjective   Kimberly reports that her shoulder is not too bad but sore from all the work she has beendoing cleaning out her brother's house. She feels there is 50% overall improvement withs the shoulder. Knees hurting after having to do a lot of walking yesterday..  Pain reported as 1/10. 0/10 pain in the shoulder at rest; 6/10 with ROM    Objective    Shoulder Range of Motion  Right Shoulder    Active (deg) Passive  (deg) Pain   Flexion 145 175 Yes   Extension         Scaption 155 170 Yes   ABduction 155 165 Yes   ADduction         Horizontal ABduction         Horizontal ADduction         External Rotation (Shoulder ABducted 0 degrees) 65 75 Yes   External Rotation (Shoulder ABducted 45 degrees)         External Rotation (Shoulder ABducted 90 degrees)         Internal Rotation (Shoulder ABducted 0 degrees) 70 80 Yes   Internal Rotation (Shoulder ABducted 45 degrees)         Internal Rotation (Shoulder ABducted 90 degrees)                    Treatment:  Therapeutic Exercise  TE 1: 38 mins; pendulum's, pulley's, scap elevations, scap retractions, shoudler isometrics, active ER, active IR, gentle reaches  Manual Therapy  MT 1: 10 mins; STM to right upper trap, passive cervical stretch, Biofreeze  Therapeutic Activity  TA 1: 15 mins; NuStep push/pull  Modalities  Moist Heat (min): 10 mins; moist heat to the right shoulder pre ex  Electrical Stimulation (Parameters): 15 mins; IFC e-stim with cold pack to the the right shoulder    Time Entry(in minutes): 88  E-Stim (Unattended) Time Entry: 15 (IFC e-stim with moist heat post ex)  Hot/Cold Pack Time Entry: 10 (moist heat to the left shoulder pre ex)  Manual Therapy Time Entry: 10  Therapeutic Activity Time Entry: 15  Therapeutic Exercise Time Entry: 38    Assessment & Plan   Assessment: Continued with the plan of care. Kimberly has made improvement evident by her score on the functional outcome tool going from 71% limitation to 65% limitation. Measures demonstrate improved active and passive shoulder ROM with improved strength. She has met 2 more STG's. The shoulder is less irritable overall allowing for improved active motion. There is improved pain free ROM both passive and actively. She is getting full front and side elevation on the pulley's. More functional use at home with activities.  Evaluation/Treatment Tolerance: Patient tolerated treatment well    Patient will continue to  benefit from skilled outpatient physical therapy to address the deficits listed in the problem list box on initial evaluation, provide pt/family education and to maximize pt's level of independence in the home and community environment.     Patient's spiritual, cultural, and educational needs considered and patient agreeable to plan of care and goals.           Plan: Continue with the plan of care to reduce pain and inflammation, restore ROM and improve shoulder stability. Will progress as tolerated.    Goals:   Active       LTG       Patient will report at least 20% disability reduction from initial Quick Dash score to indicate clinically significant functional improvement   (Progressing)       Start:  03/11/25    Expected End:  06/03/25            Patient will report at least 20% increase on initial FOTO score to indicate clinically significant functional improvement   (Progressing)       Start:  03/11/25    Expected End:  06/03/25            Patient will report 70% overall perceived improvement   (Progressing)       Start:  03/11/25    Expected End:  06/03/25            Patient will demonstrate independence with final HEP (Progressing)       Start:  03/11/25    Expected End:  06/03/25            Patient will demonstrate 80% of full active ROM of the right shoulder and report 2/10 pain or less (Progressing)       Start:  03/11/25    Expected End:  06/03/25              Resolved       STG       Patient will report at least 10% disability reduction from initial Quick Dash score to indicate clinically significant functional improvement   (Met)       Start:  03/11/25    Expected End:  04/22/25    Resolved:  04/15/25         Patient will report at least 10% increase on initial FOTO score to indicate clinically significant functional improvement   (Met)       Start:  03/11/25    Expected End:  04/22/25    Resolved:  04/15/25         Patient will report 50% overall perceived improvement   (Met)       Start:  03/11/25     Expected End:  04/22/25    Resolved:  04/17/25         Patient will demonstrate independence with HEP (Met)       Start:  03/11/25    Expected End:  04/22/25    Resolved:  04/15/25         Patient will demonstrate full passive ROM of the right shoulder and report 2/10 pain or less (Met)       Start:  03/11/25    Expected End:  04/22/25    Resolved:  04/17/25             Catrachito Quezada PT

## 2025-04-24 ENCOUNTER — CLINICAL SUPPORT (OUTPATIENT)
Dept: REHABILITATION | Facility: HOSPITAL | Age: 65
End: 2025-04-24
Payer: MEDICARE

## 2025-04-24 DIAGNOSIS — M25.511 RIGHT SHOULDER PAIN, UNSPECIFIED CHRONICITY: ICD-10-CM

## 2025-04-24 DIAGNOSIS — S46.001A INJURY OF RIGHT ROTATOR CUFF, INITIAL ENCOUNTER: Primary | ICD-10-CM

## 2025-04-24 DIAGNOSIS — M75.101 TEAR OF RIGHT SUPRASPINATUS TENDON: ICD-10-CM

## 2025-04-24 PROCEDURE — 97530 THERAPEUTIC ACTIVITIES: CPT

## 2025-04-24 PROCEDURE — 97140 MANUAL THERAPY 1/> REGIONS: CPT

## 2025-04-24 PROCEDURE — 97110 THERAPEUTIC EXERCISES: CPT

## 2025-04-24 PROCEDURE — 97010 HOT OR COLD PACKS THERAPY: CPT

## 2025-04-24 PROCEDURE — 97014 ELECTRIC STIMULATION THERAPY: CPT

## 2025-04-24 NOTE — PROGRESS NOTES
Outpatient Rehab    Physical Therapy Progress Note    Patient Name: Kimberly Agarwal  MRN: [id number]  YOB: 1960  Encounter Date: 4/24/2025    Therapy Diagnosis:   Encounter Diagnoses   Name Primary?    Injury of right rotator cuff, initial encounter Yes    Right shoulder pain, unspecified chronicity     Tear of right supraspinatus tendon      Physician: Pranay Tsai MD    Physician Orders: Eval and Treat  Medical Diagnosis: Unspecified injury of muscle(s) and tendon(s) of the rotator cuff of right shoulder, initial encounter    Visit # / Visits Authorized:  12 / 24  Insurance Authorization Period: 3/11/2025 to 6/3/2025  Date of Evaluation: 3/11/2025   Plan of Care Certification: 3/11/2025 to 6/3/2025     Time In: 0758   Time Out: 0926  Total Time: 88   Total Billable Time:  88      FOTO:  Therapist reviewed FOTO scores for Kimberly Agarwal on 3/11/2025.   FOTO report - see Media section or FOTO account episode details.      Intake: Patient's Physical FS Primary Measure:  29 (goal is  53@ visit # 15)  Intake:Risk Adjusted Statistical FOTO: 43  Intake:Limitation Score: 71%  Intake:Category: Carrying  Intake: DASH:  80.8% (higher score = greater disability)    4/03/2025: Patient's Physical FS Primary Measure:  35 (goal is  53@ visit # 15)  Intake: Risk Adjusted Statistical FOTO: 43  4/03/2025: Limitation Score: 65%  4/03/2025: Category: Carrying  4/03/2025: DASH:  73.3% (higher score = greater disability)     Intake Score: 29 (53 predicted at visit 15)%   Survey Score 2: 35       Subjective   He is doing pretty well. She is sore in the shoulder from doing a lot. Knows what motions to avoid the sharp pains..  Pain reported as 1/10.      Objective    Shoulder Range of Motion  Right Shoulder    Active (deg) Passive (deg) Pain   Flexion 145 175 Yes   Extension         Scaption 155 170 Yes   ABduction 155 165 Yes   ADduction         Horizontal ABduction         Horizontal ADduction          External Rotation (Shoulder ABducted 0 degrees) 65 75 Yes   External Rotation (Shoulder ABducted 45 degrees)         External Rotation (Shoulder ABducted 90 degrees)         Internal Rotation (Shoulder ABducted 0 degrees) 70 80 Yes   Internal Rotation (Shoulder ABducted 45 degrees)         Internal Rotation (Shoulder ABducted 90 degrees)                    Treatment:  Therapeutic Exercise  TE 1: 38 mins; pendulum's, pulley's, scap elevations, scap retractions, shoudler isometrics, active ER, active IR, gentle reaches  Manual Therapy  MT 1: 10 mins; STM to right upper trap, passive cervical stretch, Biofreeze  Therapeutic Activity  TA 1: 15 mins; NuStep push/pull  Modalities  Moist Heat (min): 10 mins; moist heat to the right shoulder pre ex  Electrical Stimulation (Parameters): 15 mins; IFC e-stim with cold pack to the the right shoulder    Time Entry(in minutes): 88  E-Stim (Unattended) Time Entry: 15  Hot/Cold Pack Time Entry: 10  Manual Therapy Time Entry: 10  Therapeutic Activity Time Entry: 15  Therapeutic Exercise Time Entry: 38    Assessment & Plan   Assessment: Continued with the plan of care. Kimberly is improving as we were able to increase some of the resistance today with some exercises. The shoulder is less irritable overall allowing for improved active motion and she is doing more around the house. There is improved pain free ROM both passive and actively. She is getting full front and side elevation on the pulley's.  Evaluation/Treatment Tolerance: Patient tolerated treatment well    Patient will continue to benefit from skilled outpatient physical therapy to address the deficits listed in the problem list box on initial evaluation, provide pt/family education and to maximize pt's level of independence in the home and community environment.     Patient's spiritual, cultural, and educational needs considered and patient agreeable to plan of care and goals.           Plan: Continue with the plan of  care to reduce pain and inflammation, restore ROM and improve shoulder stability. Will progress as tolerated.    Goals:   Active       LTG       Patient will report at least 20% disability reduction from initial Quick Dash score to indicate clinically significant functional improvement   (Progressing)       Start:  03/11/25    Expected End:  06/03/25            Patient will report at least 20% increase on initial FOTO score to indicate clinically significant functional improvement   (Progressing)       Start:  03/11/25    Expected End:  06/03/25            Patient will report 70% overall perceived improvement   (Progressing)       Start:  03/11/25    Expected End:  06/03/25            Patient will demonstrate independence with final HEP (Progressing)       Start:  03/11/25    Expected End:  06/03/25            Patient will demonstrate 80% of full active ROM of the right shoulder and report 2/10 pain or less (Progressing)       Start:  03/11/25    Expected End:  06/03/25              Resolved       STG       Patient will report at least 10% disability reduction from initial Quick Dash score to indicate clinically significant functional improvement   (Met)       Start:  03/11/25    Expected End:  04/22/25    Resolved:  04/15/25         Patient will report at least 10% increase on initial FOTO score to indicate clinically significant functional improvement   (Met)       Start:  03/11/25    Expected End:  04/22/25    Resolved:  04/15/25         Patient will report 50% overall perceived improvement   (Met)       Start:  03/11/25    Expected End:  04/22/25    Resolved:  04/17/25         Patient will demonstrate independence with HEP (Met)       Start:  03/11/25    Expected End:  04/22/25    Resolved:  04/15/25         Patient will demonstrate full passive ROM of the right shoulder and report 2/10 pain or less (Met)       Start:  03/11/25    Expected End:  04/22/25    Resolved:  04/17/25             Catrachito Quezada PT

## 2025-04-29 ENCOUNTER — CLINICAL SUPPORT (OUTPATIENT)
Dept: REHABILITATION | Facility: HOSPITAL | Age: 65
End: 2025-04-29
Payer: MEDICARE

## 2025-04-29 DIAGNOSIS — M75.101 TEAR OF RIGHT SUPRASPINATUS TENDON: ICD-10-CM

## 2025-04-29 DIAGNOSIS — M25.511 RIGHT SHOULDER PAIN, UNSPECIFIED CHRONICITY: ICD-10-CM

## 2025-04-29 DIAGNOSIS — S46.001A INJURY OF RIGHT ROTATOR CUFF, INITIAL ENCOUNTER: Primary | ICD-10-CM

## 2025-04-29 PROCEDURE — 97140 MANUAL THERAPY 1/> REGIONS: CPT

## 2025-04-29 PROCEDURE — 97014 ELECTRIC STIMULATION THERAPY: CPT

## 2025-04-29 PROCEDURE — 97010 HOT OR COLD PACKS THERAPY: CPT

## 2025-04-29 PROCEDURE — 97530 THERAPEUTIC ACTIVITIES: CPT

## 2025-04-29 PROCEDURE — 97110 THERAPEUTIC EXERCISES: CPT

## 2025-04-29 NOTE — PROGRESS NOTES
Outpatient Rehab    Physical Therapy Progress Note    Patient Name: Kimberly Agarwal  MRN: [id number]  YOB: 1960  Encounter Date: 4/29/2025    Therapy Diagnosis:   Encounter Diagnoses   Name Primary?    Injury of right rotator cuff, initial encounter Yes    Right shoulder pain, unspecified chronicity     Tear of right supraspinatus tendon      Physician: Pranay Tsai MD    Physician Orders: Eval and Treat  Medical Diagnosis: Unspecified injury of muscle(s) and tendon(s) of the rotator cuff of right shoulder, initial encounter    Visit # / Visits Authorized:  13 / 24  Insurance Authorization Period: 3/11/2025 to 6/3/2025  Date of Evaluation: 3/11/2025   Plan of Care Certification: 3/11/2025 to 6/3/2025     Time In: 0819   Time Out: 0947  Total Time: 88   Total Billable Time: 88      FOTO:  Therapist reviewed FOTO scores for Kimberly Agarwal on 3/11/2025.   FOTO report - see Media section or FOTO account episode details.      Intake: Patient's Physical FS Primary Measure:  29 (goal is  53@ visit # 15)  Intake:Risk Adjusted Statistical FOTO: 43  Intake:Limitation Score: 71%  Intake:Category: Carrying  Intake: DASH:  80.8% (higher score = greater disability)    4/03/2025: Patient's Physical FS Primary Measure:  35 (goal is  53@ visit # 15)  Intake: Risk Adjusted Statistical FOTO: 43  4/03/2025: Limitation Score: 65%  4/03/2025: Category: Carrying  4/03/2025: DASH:  73.3% (higher score = greater disability)     Intake Score: 29 (53 predicted at visit 15)%   Survey Score 2: 35       Subjective   Reports that the shoulder is sore. Hasbeen picking a lot of black berries. Goes back to her doctor on Tuesday next week..  Pain reported as 3/10.      Objective    Shoulder Range of Motion  Right Shoulder    Active (deg) Passive (deg) Pain   Flexion 145 175 Yes   Extension         Scaption 155 170 Yes   ABduction 155 165 Yes   ADduction         Horizontal ABduction         Horizontal ADduction          External Rotation (Shoulder ABducted 0 degrees) 65 75 Yes   External Rotation (Shoulder ABducted 45 degrees)         External Rotation (Shoulder ABducted 90 degrees)         Internal Rotation (Shoulder ABducted 0 degrees) 70 80 Yes   Internal Rotation (Shoulder ABducted 45 degrees)         Internal Rotation (Shoulder ABducted 90 degrees)                    Treatment:  Therapeutic Exercise  TE 1: 38 mins; pendulum's, pulley's, scap elevations, scap retractions, shoudler isometrics, active ER, active IR, gentle reaches  Manual Therapy  MT 1: 10 mins; STM to right upper trap, passive cervical stretch, Biofreeze  Therapeutic Activity  TA 1: 15 mins; NuStep push/pull  Modalities  Moist Heat (min): 10 mins; moist heat to the right shoulder pre ex  Electrical Stimulation (Parameters): 15 mins; IFC e-stim with cold pack to the the right shoulder    Time Entry(in minutes): 88  E-Stim (Unattended) Time Entry: 15  Hot/Cold Pack Time Entry: 10  Manual Therapy Time Entry: 10  Therapeutic Activity Time Entry: 15  Therapeutic Exercise Time Entry: 38    Assessment & Plan   Assessment: Continued with the plan of care. Kimberly has more functional use of her arm despite it staying sore. She does have to watch her movements. The arm improving as we were able to increase some of the resistance today with some exercises. There is improved pain free ROM both passive and actively. She is getting full front and side elevation on the pulley's. While improved she does feel she is limited and has and is giving some consideration to shoulder replacement surgery. To see her doctor on next Tuesday.  Evaluation/Treatment Tolerance: Patient tolerated treatment well    Patient will continue to benefit from skilled outpatient physical therapy to address the deficits listed in the problem list box on initial evaluation, provide pt/family education and to maximize pt's level of independence in the home and community environment.     Patient's  spiritual, cultural, and educational needs considered and patient agreeable to plan of care and goals.           Plan: Continue with the plan of care to reduce pain and inflammation, restore ROM and improve shoulder stability. Will progress as tolerated and maximize function.    Goals:   Active       LTG       Patient will report at least 20% disability reduction from initial Quick Dash score to indicate clinically significant functional improvement   (Progressing)       Start:  03/11/25    Expected End:  06/03/25            Patient will report at least 20% increase on initial FOTO score to indicate clinically significant functional improvement   (Progressing)       Start:  03/11/25    Expected End:  06/03/25            Patient will report 70% overall perceived improvement   (Progressing)       Start:  03/11/25    Expected End:  06/03/25            Patient will demonstrate independence with final HEP (Progressing)       Start:  03/11/25    Expected End:  06/03/25            Patient will demonstrate 80% of full active ROM of the right shoulder and report 2/10 pain or less (Progressing)       Start:  03/11/25    Expected End:  06/03/25              Resolved       STG       Patient will report at least 10% disability reduction from initial Quick Dash score to indicate clinically significant functional improvement   (Met)       Start:  03/11/25    Expected End:  04/22/25    Resolved:  04/15/25         Patient will report at least 10% increase on initial FOTO score to indicate clinically significant functional improvement   (Met)       Start:  03/11/25    Expected End:  04/22/25    Resolved:  04/15/25         Patient will report 50% overall perceived improvement   (Met)       Start:  03/11/25    Expected End:  04/22/25    Resolved:  04/17/25         Patient will demonstrate independence with HEP (Met)       Start:  03/11/25    Expected End:  04/22/25    Resolved:  04/15/25         Patient will demonstrate full passive  ROM of the right shoulder and report 2/10 pain or less (Met)       Start:  03/11/25    Expected End:  04/22/25    Resolved:  04/17/25             Catrachito Quezada PT

## 2025-05-01 ENCOUNTER — CLINICAL SUPPORT (OUTPATIENT)
Dept: REHABILITATION | Facility: HOSPITAL | Age: 65
End: 2025-05-01
Payer: MEDICARE

## 2025-05-01 DIAGNOSIS — S46.001A INJURY OF RIGHT ROTATOR CUFF, INITIAL ENCOUNTER: Primary | ICD-10-CM

## 2025-05-01 DIAGNOSIS — M25.511 RIGHT SHOULDER PAIN, UNSPECIFIED CHRONICITY: ICD-10-CM

## 2025-05-01 DIAGNOSIS — M75.101 TEAR OF RIGHT SUPRASPINATUS TENDON: ICD-10-CM

## 2025-05-01 PROCEDURE — 97014 ELECTRIC STIMULATION THERAPY: CPT

## 2025-05-01 PROCEDURE — 97530 THERAPEUTIC ACTIVITIES: CPT

## 2025-05-01 PROCEDURE — 97010 HOT OR COLD PACKS THERAPY: CPT

## 2025-05-01 PROCEDURE — 97140 MANUAL THERAPY 1/> REGIONS: CPT

## 2025-05-01 PROCEDURE — 97110 THERAPEUTIC EXERCISES: CPT

## 2025-05-01 NOTE — PROGRESS NOTES
Outpatient Rehab    Physical Therapy Progress Note    Patient Name: Kimberly Agarwal  MRN: [id number]  YOB: 1960  Encounter Date: 5/1/2025    Therapy Diagnosis:   Encounter Diagnoses   Name Primary?    Injury of right rotator cuff, initial encounter Yes    Right shoulder pain, unspecified chronicity     Tear of right supraspinatus tendon      Physician: Pranay Tsai MD    Physician Orders: Eval and Treat  Medical Diagnosis: Unspecified injury of muscle(s) and tendon(s) of the rotator cuff of right shoulder, initial encounter    Visit # / Visits Authorized:  14 / 24  Insurance Authorization Period: 3/11/2025 to 6/3/2025  Date of Evaluation: 3/11/2025   Plan of Care Certification: 3/11/2025 to 6/3/2025     Time In: 0806   Time Out: 0934  Total Time: 88   Total Billable Time: 88      FOTO:  Therapist reviewed FOTO scores for Kimberly Agarwal on 3/11/2025.   FOTO report - see Media section or FOTO account episode details.      Intake: Patient's Physical FS Primary Measure:  29 (goal is  53@ visit # 15)  Intake:Risk Adjusted Statistical FOTO: 43  Intake:Limitation Score: 71%  Intake:Category: Carrying  Intake: DASH:  80.8% (higher score = greater disability)    4/03/2025: Patient's Physical FS Primary Measure:  35 (goal is  53@ visit # 15)  Intake: Risk Adjusted Statistical FOTO: 43  4/03/2025: Limitation Score: 65%  4/03/2025: Category: Carrying  4/03/2025: DASH:  73.3% (higher score = greater disability)     Intake Score: 29 (53 predicted at visit 15)%   Survey Score 2: 35       Subjective   Shoulder more sore of late due to doing more. Plans to ask the doctor next week at her appointment for another injection..  Pain reported as 4/10.      Objective    Shoulder Range of Motion  Right Shoulder    Active (deg) Passive (deg) Pain   Flexion 145 175 Yes   Extension         Scaption 155 170 Yes   ABduction 155 165 Yes   ADduction         Horizontal ABduction         Horizontal ADduction          External Rotation (Shoulder ABducted 0 degrees) 65 75 Yes   External Rotation (Shoulder ABducted 45 degrees)         External Rotation (Shoulder ABducted 90 degrees)         Internal Rotation (Shoulder ABducted 0 degrees) 70 80 Yes   Internal Rotation (Shoulder ABducted 45 degrees)         Internal Rotation (Shoulder ABducted 90 degrees)                    Treatment:  Therapeutic Exercise  TE 1: 38 mins; pendulum's, pulley's, scap elevations, scap retractions, shoudler isometrics, active ER, active IR, gentle reaches  Manual Therapy  MT 1: 10 mins; STM to right upper trap, passive cervical stretch, Biofreeze  Therapeutic Activity  TA 1: 15 mins; NuStep push/pull  Modalities  Moist Heat (min): 10 mins; moist heat to the right shoulder pre ex  Electrical Stimulation (Parameters): 15 mins; IFC e-stim with cold pack to the the right shoulder    Time Entry(in minutes): 88  E-Stim (Unattended) Time Entry: 15  Hot/Cold Pack Time Entry: 10  Manual Therapy Time Entry: 10  Therapeutic Activity Time Entry: 15  Therapeutic Exercise Time Entry: 38    Assessment & Plan   Assessment: Continued with the plan of care. Kimberly has more functional use of her arm despite it staying sore. She does have to watch her movements. The arm improving as we were able to increase some of the resistance today with some exercises. There is improved pain free ROM both passive and actively. She is getting full front and side elevation on the pulley's. While improved she does feel she is limited and has and is giving some consideration to shoulder replacement surgery. To see her doctor on next Tuesday.  Evaluation/Treatment Tolerance: Patient tolerated treatment well    Patient will continue to benefit from skilled outpatient physical therapy to address the deficits listed in the problem list box on initial evaluation, provide pt/family education and to maximize pt's level of independence in the home and community environment.     Patient's  spiritual, cultural, and educational needs considered and patient agreeable to plan of care and goals.           Plan: Continue with the plan of care to reduce pain and inflammation, restore ROM and improve shoulder stability. Will progress as tolerated and maximize function.    Goals:   Active       LTG       Patient will report at least 20% disability reduction from initial Quick Dash score to indicate clinically significant functional improvement   (Progressing)       Start:  03/11/25    Expected End:  06/03/25            Patient will report at least 20% increase on initial FOTO score to indicate clinically significant functional improvement   (Progressing)       Start:  03/11/25    Expected End:  06/03/25            Patient will report 70% overall perceived improvement   (Progressing)       Start:  03/11/25    Expected End:  06/03/25            Patient will demonstrate independence with final HEP (Progressing)       Start:  03/11/25    Expected End:  06/03/25            Patient will demonstrate 80% of full active ROM of the right shoulder and report 2/10 pain or less (Progressing)       Start:  03/11/25    Expected End:  06/03/25              Resolved       STG       Patient will report at least 10% disability reduction from initial Quick Dash score to indicate clinically significant functional improvement   (Met)       Start:  03/11/25    Expected End:  04/22/25    Resolved:  04/15/25         Patient will report at least 10% increase on initial FOTO score to indicate clinically significant functional improvement   (Met)       Start:  03/11/25    Expected End:  04/22/25    Resolved:  04/15/25         Patient will report 50% overall perceived improvement   (Met)       Start:  03/11/25    Expected End:  04/22/25    Resolved:  04/17/25         Patient will demonstrate independence with HEP (Met)       Start:  03/11/25    Expected End:  04/22/25    Resolved:  04/15/25         Patient will demonstrate full passive  ROM of the right shoulder and report 2/10 pain or less (Met)       Start:  03/11/25    Expected End:  04/22/25    Resolved:  04/17/25             Catrachito Quezada PT

## 2025-05-06 ENCOUNTER — LAB VISIT (OUTPATIENT)
Dept: LAB | Facility: HOSPITAL | Age: 65
End: 2025-05-06
Payer: MEDICARE

## 2025-05-06 ENCOUNTER — OFFICE VISIT (OUTPATIENT)
Dept: ORTHOPEDICS | Facility: CLINIC | Age: 65
End: 2025-05-06
Payer: MEDICARE

## 2025-05-06 VITALS
SYSTOLIC BLOOD PRESSURE: 142 MMHG | WEIGHT: 273.38 LBS | HEIGHT: 62 IN | DIASTOLIC BLOOD PRESSURE: 88 MMHG | BODY MASS INDEX: 50.31 KG/M2 | HEART RATE: 97 BPM

## 2025-05-06 DIAGNOSIS — E11.628 TYPE 2 DIABETES MELLITUS WITH OTHER SKIN COMPLICATION, WITHOUT LONG-TERM CURRENT USE OF INSULIN: ICD-10-CM

## 2025-05-06 DIAGNOSIS — E55.9 VITAMIN D DEFICIENCY: ICD-10-CM

## 2025-05-06 DIAGNOSIS — S46.001D INJURY OF RIGHT ROTATOR CUFF, SUBSEQUENT ENCOUNTER: Primary | ICD-10-CM

## 2025-05-06 DIAGNOSIS — E78.2 MIXED HYPERLIPIDEMIA: Chronic | ICD-10-CM

## 2025-05-06 LAB
25(OH)D3+25(OH)D2 SERPL-MCNC: 63 NG/ML (ref 30–80)
ALBUMIN SERPL-MCNC: 3.9 G/DL (ref 3.4–4.8)
ALBUMIN/GLOB SERPL: 1.1 RATIO (ref 1.1–2)
ALP SERPL-CCNC: 74 UNIT/L (ref 40–150)
ALT SERPL-CCNC: 13 UNIT/L (ref 0–55)
ANION GAP SERPL CALC-SCNC: 11 MEQ/L
AST SERPL-CCNC: 14 UNIT/L (ref 11–45)
BILIRUB SERPL-MCNC: 0.5 MG/DL
BILIRUB UR QL STRIP.AUTO: NEGATIVE
BUN SERPL-MCNC: 16 MG/DL (ref 9.8–20.1)
CALCIUM SERPL-MCNC: 9.9 MG/DL (ref 8.4–10.2)
CHLORIDE SERPL-SCNC: 108 MMOL/L (ref 98–107)
CHOLEST SERPL-MCNC: 158 MG/DL
CHOLEST/HDLC SERPL: 4 {RATIO} (ref 0–5)
CLARITY UR: CLEAR
CO2 SERPL-SCNC: 28 MMOL/L (ref 23–31)
COLOR UR AUTO: YELLOW
CREAT SERPL-MCNC: 0.76 MG/DL (ref 0.55–1.02)
CREAT UR-MCNC: 28.1 MG/DL (ref 45–106)
CREAT/UREA NIT SERPL: 21
EST. AVERAGE GLUCOSE BLD GHB EST-MCNC: 111.2 MG/DL
GFR SERPLBLD CREATININE-BSD FMLA CKD-EPI: >60 ML/MIN/1.73/M2
GLOBULIN SER-MCNC: 3.4 GM/DL (ref 2.4–3.5)
GLUCOSE SERPL-MCNC: 91 MG/DL (ref 82–115)
GLUCOSE UR QL STRIP: NEGATIVE
HBA1C MFR BLD: 5.5 %
HDLC SERPL-MCNC: 42 MG/DL (ref 35–60)
HGB UR QL STRIP: NEGATIVE
KETONES UR QL STRIP: NEGATIVE
LDLC SERPL CALC-MCNC: 74 MG/DL (ref 50–140)
LEUKOCYTE ESTERASE UR QL STRIP: NEGATIVE
MICROALBUMIN UR-MCNC: 22.4 UG/ML
MICROALBUMIN/CREAT RATIO PNL UR: 79.7 MG/GM CR (ref 0–30)
NITRITE UR QL STRIP: NEGATIVE
PH UR STRIP: 7 [PH]
POTASSIUM SERPL-SCNC: 4.4 MMOL/L (ref 3.5–5.1)
PROT SERPL-MCNC: 7.3 GM/DL (ref 5.8–7.6)
PROT UR QL STRIP: NEGATIVE
SODIUM SERPL-SCNC: 147 MMOL/L (ref 136–145)
SP GR UR STRIP.AUTO: <=1.005 (ref 1–1.03)
TRIGL SERPL-MCNC: 209 MG/DL (ref 37–140)
UROBILINOGEN UR STRIP-ACNC: 0.2
VLDLC SERPL CALC-MCNC: 42 MG/DL

## 2025-05-06 PROCEDURE — 82306 VITAMIN D 25 HYDROXY: CPT

## 2025-05-06 PROCEDURE — 80053 COMPREHEN METABOLIC PANEL: CPT

## 2025-05-06 PROCEDURE — 81003 URINALYSIS AUTO W/O SCOPE: CPT

## 2025-05-06 PROCEDURE — 82570 ASSAY OF URINE CREATININE: CPT

## 2025-05-06 PROCEDURE — 80061 LIPID PANEL: CPT

## 2025-05-06 PROCEDURE — 36415 COLL VENOUS BLD VENIPUNCTURE: CPT

## 2025-05-06 PROCEDURE — 83036 HEMOGLOBIN GLYCOSYLATED A1C: CPT

## 2025-05-06 RX ADMIN — BETAMETHASONE SODIUM PHOSPHATE AND BETAMETHASONE ACETATE 6 MG: 3; 3 INJECTION, SUSPENSION INTRA-ARTICULAR; INTRALESIONAL; INTRAMUSCULAR; SOFT TISSUE at 09:05

## 2025-05-06 RX ADMIN — LIDOCAINE HYDROCHLORIDE 3 ML: 10 INJECTION, SOLUTION INFILTRATION; PERINEURAL at 09:05

## 2025-05-06 NOTE — PROGRESS NOTES
Subjective:      Patient ID: Kimberly Agarwal is a 64 y.o. female.    Chief Complaint: Follow-up (RT shoulder, last injection given 2/26/25- Stated last injection did help with pain but has worn off since then. Would like another injection today. Has been attending PT. )    HPI:   Kimberly Agarwal is a 64 y.o. female who presents today for follow up evaluation of her R shoulder    History of Present Illness    CHIEF COMPLAINT:  - Shoulder pain and limited range of motion.    HPI:  Kimberly presents for follow-up of shoulder pain. She reports improvement in range of motion but continues to have pain, which has been ongoing for a while with recent worsening. She can now perform simple gestures like waving, but still has limitations, particularly when pushing against resistance. She is currently taking oxycodone 7.5 mg 3 times daily with Tylenol, which does not adequately control the pain. Previously, she was taking pain medication twice daily for back and knee pain but has increased to 3 times daily due to the shoulder issue. Her  suggested getting a cortisone injection for pain relief. She has been attending PT consistently, even during a recent family bereavement.  She reports therapy has been quite helpful.    PREVIOUS TREATMENTS:  - PT for shoulder    MEDICATIONS:  - Oxycodone: 7.5 mg 1 tablet 3 times daily with Tylenol for shoulder pain, inadequate pain control    SOCIAL HISTORY:  - Marital Status:       ROS:  Musculoskeletal: +joint pain, +back pain, +limb pain, +muscle pain, +pain with movement, +limited movement          Past Medical History:   Diagnosis Date    Achilles tendinitis, left leg     Astigmatism     Bursitis of right shoulder     Chronic back pain     Chronic pain     GERD (gastroesophageal reflux disease)     Hepatitis a without hepatic coma     Herpes labialis     High cholesterol     HLD (hyperlipidemia)     HTN (hypertension)     Hyperopia     Hypokalemia     Hypokalemia  "10/26/2018    Lower extremity edema     OAB (overactive bladder)     Personal history of colonic polyps 04/27/2022    s/p polypectomy - Dr Matheus Alba    Presbyopia     RLS (restless legs syndrome)     Sciatica     Sleep apnea     CPAP    Sleep disorder     Spondylosis of lumbosacral spine with radiculopathy     Type 2 diabetes mellitus with unspecified diabetic retinopathy without macular edema     Urinary incontinence, mixed     Vitamin D deficiency     Wound of right leg 10/27/2022     Past Surgical History:   Procedure Laterality Date    APPENDECTOMY      BLOCK, NERVE, GENICULAR Bilateral 05/05/2023    Dr Phillip Clement    COLONOSCOPY W/ BIOPSIES AND POLYPECTOMY  04/27/2022    Dr Matheus Alba    COLONOSCOPY W/ BIOPSIES AND POLYPECTOMY  12/09/2019    Dr Matheus Alba    EPIDURAL STEROID INJECTION INTO LUMBAR SPINE      Dr Lorelei Schmidt    ESOPHAGOGASTRODUODENOSCOPY  04/27/2022    Dr Matheus Alba    ESOPHAGOGASTRODUODENOSCOPY  12/09/2019    Dr Matheus Alba    JOINT REPLACEMENT  2 years ago    Complete left knee replacement    LIVER BIOPSY      LUMBAR FUSION  2010    Fused L4-5    OPEN REDUCTION AND INTERNAL FIXATION (ORIF) OF INJURY OF WRIST Right     RADIOFREQUENCY ABLATION OF LUMBAR MEDIAL BRANCH NERVE AT SINGLE LEVEL Bilateral 09/09/2022    Dr Phillip Clement MD    RADIOFREQUENCY ABLATION OF LUMBAR MEDIAL BRANCH NERVE AT SINGLE LEVEL Bilateral 09/08/2023    Procedure: RADIOFREQUENCY ABLATION, NERVE, SPINAL, LUMBAR, MEDIAL BRANCH, 1 LEVEL (Bilateral L3-5 RFA) *Latex Allergy*;  Surgeon: Phillip Clement MD;  Location: [facility];  Service: Pain Management;  Laterality: Bilateral;    SPINE SURGERY      TONSILLECTOMY      TOTAL KNEE ARTHROPLASTY Left 2020     Social History[1]    Current Medications[2]  Review of patient's allergies indicates:   Allergen Reactions    Latex Rash    Adhesive tape-silicones Blisters    Ciprofloxacin        BP (!) 142/88   Pulse 97   Ht 5' 2" (1.575 m)   Wt 124 kg (273 lb 6.4 oz)   BMI 50.01 " kg/m²     Comprehensive review of systems completed and negative except as per HPI.        Objective:   Head: Normocephalic, without obvious abnormality, atraumatic  Eyes: conjunctivae/corneas clear. EOM's intact  Ears: normal external appearance  Nose: Nares normal. Septum midline. Mucosa normal. No drainage  Throat: normal findings: lips normal without lesions  Lungs: unlabored breathing on room air  Chest wall: symmetric chest rise  Heart: regular rate and rhythm  Pulses: 2+ and symmetric  Skin: Skin color, texture, turgor normal. No rashes or lesions  Neurologic: Grossly normal    right SHOULDER    Appearance:   normal    Cervical Spine:   Reduced motion     Tenderness:   diffuse    AROM:   , Abduction 160, ER 70, IR sacrum    PROM:  same    Pain:  Improved in all aspects  AROM: Positive  PROM:  Positive  End ROM: Positive  Supraspinatus strength testing: Positive  External rotation strength testing: Positive  Ora-scapular: Positive  Virtually all provacative maneuvers Positive    Strength:  Supraspinatus: reduced but compensating  External rotation: reduced but compensating    Provocative Maneuvers:     Rotator Cuff/Biceps/AC Joint  Neer's Sign: Positive  Hawkin's Test: Positive  Painful arc: Positive  Belly Press: Negative  Bear Hugger Test: Negative  Hornblower's Sign: Negative  Speed's Test: Positive  Yergeson's Test: Positive  Cross Arm Abduction: Positive    Pulses: Palpable radial pulse    Neurological deficits: None    The patient has a warm and well-perfused upper extremity with capillary refill less than 2 seconds. Sensation is intact to light touch in terminal nerve distributions. 5/5 ain/pin/uln. The patient has no palpable epitrochlear lymphadenopathy.      Assessment:     Imaging:   Four view radiographs of the right shoulder previously obtained show no acute fractures or dislocations.  Advanced AC OA. Mild GH OA.  Humeral head remains seated centrally within the glenoid.    MRI Shoulder  Without Contrast Right  Narrative: EXAMINATION:  MRI SHOULDER WITHOUT CONTRAST RIGHT    CLINICAL HISTORY:  Shoulder pain, rotator cuff disorder suspected, xray done;  Pain in right shoulder    TECHNIQUE:  Multiplanar multislice images are were performed through the right shoulder.    COMPARISON:  Radiographs right shoulder used for comparison dated 04/17/2024    FINDINGS:  A full-thickness tear is present to the distal supraspinatus tendon with torn fibers retracted to the level of the glenohumeral joint line.  An intermediate grade partial-thickness articular surface tear is present to the distal infraspinatus tendon.  Subscapularis and teres minor tendons are intact.    The glenoid labrum is intact.  Articular cartilage is intact.  The long head biceps tendon is intact.  No thickening of the joint capsule.    No os acromial.  Coracoclavicular ligaments are intact.  Mild atrophy is present to the supraspinatus muscle belly.  The included portion of the pectoralis major is intact.  Impression: A full-thickness tear is present to the distal supraspinatus tendon with torn fibers retracted to the level of the glenohumeral joint line. An intermediate grade partial-thickness articular surface tear is present to the distal infraspinatus tendon.  Mild associated atrophy is present to the supraspinatus muscle belly.    Electronically signed by: Jose Herron  Date:    02/25/2025  Time:    15:35    MRI shows full-thickness tear of the supraspinatus.  Mild associated muscular atrophy.        1. Injury of right rotator cuff, subsequent encounter            Plan:       Orders Placed This Encounter    Large Joint Aspiration/Injection: R subacromial bursa          Imaging and exam findings discussed.  Her motion has made substantial improvement since her last visit.  We discussed her options and she would like to proceed with repeat injection and continue therapy.  Injection was provided and post-injection care was discussed.   Follow up in 3 months for repeat clinical evaluation. RICE. Symptomatic management.     All questions were answered. Patient happy and in agreement with the plan.     This note was generated with the assistance of ambient listening technology. Verbal consent was obtained by the patient and accompanying visitor(s) for the recording of patient appointment to facilitate this note. I attest to having reviewed and edited the generated note for accuracy, though some syntax or spelling errors may persist. Please contact the author of this note for any clarification.           [1]   Social History  Socioeconomic History    Marital status:    Tobacco Use    Smoking status: Former     Current packs/day: 0.00     Average packs/day: 0.3 packs/day for 5.2 years (1.3 ttl pk-yrs)     Types: Cigarettes     Quit date: 2024     Years since quittin.2    Smokeless tobacco: Never   Substance and Sexual Activity    Alcohol use: Not Currently    Drug use: Never    Sexual activity: Not Currently     Partners: Male     Social Drivers of Health     Financial Resource Strain: Low Risk  (2023)    Overall Financial Resource Strain (CARDIA)     Difficulty of Paying Living Expenses: Not hard at all   Food Insecurity: No Food Insecurity (2023)    Hunger Vital Sign     Worried About Running Out of Food in the Last Year: Never true     Ran Out of Food in the Last Year: Never true   Transportation Needs: No Transportation Needs (2023)    PRAPARE - Transportation     Lack of Transportation (Medical): No     Lack of Transportation (Non-Medical): No   Physical Activity: Inactive (2023)    Exercise Vital Sign     Days of Exercise per Week: 0 days     Minutes of Exercise per Session: 0 min   Stress: Stress Concern Present (2023)    [employer] [place] [employer] - Occupational Stress Questionnaire     Feeling of Stress : To some extent   Housing Stability: Low Risk  (2023)    Housing Stability Vital  Sign     Unable to Pay for Housing in the Last Year: No     Number of Places Lived in the Last Year: 1     Unstable Housing in the Last Year: No   [2]   Current Outpatient Medications:     betamethasone valerate 0.1% (VALISONE) 0.1 % Crea, APPLY 1 APPLICATION TOPICALLY 2 (TWO) TIMES A DAY., Disp: 45 g, Rfl: 3    calcium carbonate/vitamin D3 (CALTRATE 600 PLUS D ORAL), Take 1 tablet by mouth 2 (two) times daily., Disp: , Rfl:     cetirizine (ZYRTEC) 10 MG tablet, Take 10 mg by mouth every evening., Disp: , Rfl:     cyclobenzaprine (FLEXERIL) 10 MG tablet, TAKE 1 TABLET THREE TIMES DAILY AS NEEDED FOR MUSCLE SPASM(S), Disp: 270 tablet, Rfl: 3    DULoxetine (CYMBALTA) 60 MG capsule, Take 1 capsule (60 mg total) by mouth every morning., Disp: 90 capsule, Rfl: 1    esomeprazole (NEXIUM) 40 MG capsule, TAKE 1 CAPSULE BEFORE BREAKFAST, Disp: 90 capsule, Rfl: 3    furosemide (LASIX) 40 MG tablet, TAKE 1 TABLET TWICE DAILY, Disp: 180 tablet, Rfl: 3    HYDROcodone-acetaminophen (NORCO) 7.5-325 mg per tablet, Take 1 tablet by mouth 2 (two) times daily as needed for Pain., Disp: , Rfl:     losartan (COZAAR) 25 MG tablet, TAKE 1 TABLET EVERY MORNING, Disp: 90 tablet, Rfl: 3    losartan-hydrochlorothiazide 50-12.5 mg (HYZAAR) 50-12.5 mg per tablet, TAKE 1 TABLET EVERY MORNING, Disp: 90 tablet, Rfl: 3    lutein 40 mg Cap, Take 1 capsule by mouth every morning., Disp: , Rfl:     metFORMIN (GLUCOPHAGE) 500 MG tablet, TAKE 1 TABLET TWICE DAILY WITH MEALS, Disp: 180 tablet, Rfl: 3    metroNIDAZOLE (METROGEL) 0.75 % gel, Apply topically 2 (two) times daily., Disp: 135 g, Rfl: 0    mirabegron (MYRBETRIQ) 50 mg Tb24, Take 1 tablet (50 mg total) by mouth once daily., Disp: 30 tablet, Rfl: 3    oxybutynin (DITROPAN-XL) 5 MG TR24, Take 1 tablet (5 mg total) by mouth once daily., Disp: 30 tablet, Rfl: 11    polyethylene glycol (GLYCOLAX) 17 gram PwPk, Take 17 g by mouth every evening., Disp: , Rfl:     potassium chloride SA (K-DUR,KLOR-CON)  20 MEQ tablet, TAKE 1 TABLET TWICE DAILY, Disp: 180 tablet, Rfl: 3    pramipexole (MIRAPEX) 0.125 MG tablet, TAKE 1 TABLET EVERY EVENING, Disp: 90 tablet, Rfl: 3    pregabalin (LYRICA) 150 MG capsule, Take 150 mg by mouth 2 (two) times a day., Disp: , Rfl:     semaglutide (OZEMPIC) 0.25 mg or 0.5 mg (2 mg/3 mL) pen injector, Inject 0.5 mg into the skin every 7 days., Disp: 3 mL, Rfl: 2    simvastatin (ZOCOR) 20 MG tablet, TAKE 1 TABLET EVERY EVENING, Disp: 90 tablet, Rfl: 3    TRUE METRIX GLUCOSE TEST STRIP Strp, CHECK BLOOD SUGAR ONE TIME DAILY, Disp: 100 strip, Rfl: 3    TRUEPLUS LANCETS 30 gauge Misc, CHECK BLOOD SUGAR ONE TIME DAILY, Disp: 100 each, Rfl: 3    valACYclovir (VALTREX) 500 MG tablet, Take 500 mg by mouth daily as needed., Disp: , Rfl:   No current facility-administered medications for this visit.    Facility-Administered Medications Ordered in Other Visits:     lactated ringers infusion, , Intravenous, Continuous, Jayjay Lagunas DO, Last Rate: 10 mL/hr at 09/09/22 0945, New Bag at 09/09/22 0945    lactated ringers infusion, , Intravenous, Continuous, Jayjay Lagunas DO, Last Rate: 10 mL/hr at 05/05/23 0713, New Bag at 09/08/23 1109    lactated ringers infusion, , Intravenous, Continuous, Jayjay Lagunas DO

## 2025-05-06 NOTE — PROCEDURES
Large Joint Aspiration/Injection: R subacromial bursa    Date/Time: 5/6/2025 9:30 AM    Performed by: Virginie Bee PA-C  Authorized by: Pranay Tsai MD    Consent Done?:  Yes (Verbal)  Indications:  Pain  Local anesthesia used?: No      Details:  Needle Size:  22 G  Ultrasonic Guidance for needle placement?: No    Approach:  Posterior  Location:  Shoulder  Site:  R subacromial bursa  Medications:  3 mL LIDOcaine HCL 10 mg/ml (1%) 10 mg/mL (1 %); 6 mg betamethasone acetate-betamethasone sodium phosphate 6 mg/mL

## 2025-05-07 RX ORDER — LIDOCAINE HYDROCHLORIDE 10 MG/ML
3 INJECTION, SOLUTION INFILTRATION; PERINEURAL
Status: DISCONTINUED | OUTPATIENT
Start: 2025-05-06 | End: 2025-05-07 | Stop reason: HOSPADM

## 2025-05-07 RX ORDER — BETAMETHASONE SODIUM PHOSPHATE AND BETAMETHASONE ACETATE 3; 3 MG/ML; MG/ML
6 INJECTION, SUSPENSION INTRA-ARTICULAR; INTRALESIONAL; INTRAMUSCULAR; SOFT TISSUE
Status: DISCONTINUED | OUTPATIENT
Start: 2025-05-06 | End: 2025-05-07 | Stop reason: HOSPADM

## 2025-05-08 ENCOUNTER — CLINICAL SUPPORT (OUTPATIENT)
Dept: REHABILITATION | Facility: HOSPITAL | Age: 65
End: 2025-05-08
Payer: MEDICARE

## 2025-05-08 DIAGNOSIS — M25.511 RIGHT SHOULDER PAIN, UNSPECIFIED CHRONICITY: ICD-10-CM

## 2025-05-08 DIAGNOSIS — S46.001A INJURY OF RIGHT ROTATOR CUFF, INITIAL ENCOUNTER: Primary | ICD-10-CM

## 2025-05-08 DIAGNOSIS — M75.101 TEAR OF RIGHT SUPRASPINATUS TENDON: ICD-10-CM

## 2025-05-08 PROCEDURE — 97010 HOT OR COLD PACKS THERAPY: CPT

## 2025-05-08 PROCEDURE — 97140 MANUAL THERAPY 1/> REGIONS: CPT

## 2025-05-08 PROCEDURE — 97110 THERAPEUTIC EXERCISES: CPT

## 2025-05-08 PROCEDURE — 97530 THERAPEUTIC ACTIVITIES: CPT

## 2025-05-08 PROCEDURE — 97014 ELECTRIC STIMULATION THERAPY: CPT

## 2025-05-08 NOTE — PROGRESS NOTES
Outpatient Rehab    Physical Therapy Progress Note    Patient Name: Kimberly Agarwal  MRN: [id number]  YOB: 1960  Encounter Date: 5/8/2025    Therapy Diagnosis:   Encounter Diagnoses   Name Primary?    Injury of right rotator cuff, initial encounter Yes    Right shoulder pain, unspecified chronicity     Tear of right supraspinatus tendon      Physician: Pranay Tsai MD    Physician Orders: Eval and Treat  Medical Diagnosis: Unspecified injury of muscle(s) and tendon(s) of the rotator cuff of right shoulder, initial encounter    Visit # / Visits Authorized:  15 / 24  Insurance Authorization Period: 3/11/2025 to 6/3/2025  Date of Evaluation: 3/11/2025   Plan of Care Certification: 3/11/2025 to 6/3/2025     Time In: 0755   Time Out: 0923  Total Time: 88   Total Billable Time: 88      FOTO:  Therapist reviewed FOTO scores for Kimberly Agarwal on 3/11/2025.   FOTO report - see Media section or FOTO account episode details.      Intake: Patient's Physical FS Primary Measure:  29 (goal is  53@ visit # 15)  Intake:Risk Adjusted Statistical FOTO: 43  Intake:Limitation Score: 71%  Intake:Category: Carrying  Intake: DASH:  80.8% (higher score = greater disability)    4/03/2025: Patient's Physical FS Primary Measure:  35 (goal is  53@ visit # 15)  Intake: Risk Adjusted Statistical FOTO: 43  4/03/2025: Limitation Score: 65%  4/03/2025: Category: Carrying  4/03/2025: DASH:  73.3% (higher score = greater disability)     Intake Score: 29 (53 predicted at visit 15)%   Survey Score 2: 35       Subjective   Saw her doctor yesterday and did get an injection. States that for some reason this one was really painful. Arm sore today but no real pain..  Pain reported as 0/10.      Objective    Shoulder Range of Motion  Right Shoulder    Active (deg) Passive (deg) Pain   Flexion 145 175 Yes   Extension         Scaption 155 170 Yes   ABduction 155 165 Yes   ADduction         Horizontal ABduction         Horizontal  ADduction         External Rotation (Shoulder ABducted 0 degrees) 65 75 Yes   External Rotation (Shoulder ABducted 45 degrees)         External Rotation (Shoulder ABducted 90 degrees)         Internal Rotation (Shoulder ABducted 0 degrees) 70 80 Yes   Internal Rotation (Shoulder ABducted 45 degrees)         Internal Rotation (Shoulder ABducted 90 degrees)                    Treatment:  Therapeutic Exercise  TE 1: 38 mins; pendulum's, pulley's, scap elevations, scap retractions, shoudler isometrics, active ER, active IR, gentle reaches  Manual Therapy  MT 1: 10 mins; STM to right upper trap, passive cervical stretch, Biofreeze  Therapeutic Activity  TA 1: 15 mins; NuStep push/pull  Modalities  Moist Heat (min): 10 mins; moist heat to the right shoulder pre ex  Electrical Stimulation (Parameters): 15 mins; IFC e-stim with cold pack to the the right shoulder    Time Entry(in minutes): 88  E-Stim (Unattended) Time Entry: 15  Hot/Cold Pack Time Entry: 10 (moist heat to shoulder pre ex)  Manual Therapy Time Entry: 10  Therapeutic Activity Time Entry: 15  Therapeutic Exercise Time Entry: 38    Assessment & Plan   Assessment: Continued with the plan of care. Kimberly has more functional use of her arm despite it staying sore. She does have to watch her movements. The arm improving as we were able to increase some of the resistance today with some exercises. There is improved pain free ROM both passive and actively. She is getting full front and side elevation on the pulley's. She is still giving consideration to surgery. She is unsure if the rotator cuff can be repaired or would need replacement. To see her doctor on next Tuesday.  Evaluation/Treatment Tolerance: Patient tolerated treatment well    Patient will continue to benefit from skilled outpatient physical therapy to address the deficits listed in the problem list box on initial evaluation, provide pt/family education and to maximize pt's level of independence in the  home and community environment.     Patient's spiritual, cultural, and educational needs considered and patient agreeable to plan of care and goals.           Plan: Continue with the plan of care to reduce pain and inflammation, restore ROM and improve shoulder stability. Will progress as tolerated and maximize function.    Goals:   Active       LTG       Patient will report at least 20% disability reduction from initial Quick Dash score to indicate clinically significant functional improvement   (Progressing)       Start:  03/11/25    Expected End:  06/03/25            Patient will report at least 20% increase on initial FOTO score to indicate clinically significant functional improvement   (Progressing)       Start:  03/11/25    Expected End:  06/03/25            Patient will report 70% overall perceived improvement   (Progressing)       Start:  03/11/25    Expected End:  06/03/25            Patient will demonstrate independence with final HEP (Progressing)       Start:  03/11/25    Expected End:  06/03/25            Patient will demonstrate 80% of full active ROM of the right shoulder and report 2/10 pain or less (Progressing)       Start:  03/11/25    Expected End:  06/03/25              Resolved       STG       Patient will report at least 10% disability reduction from initial Quick Dash score to indicate clinically significant functional improvement   (Met)       Start:  03/11/25    Expected End:  04/22/25    Resolved:  04/15/25         Patient will report at least 10% increase on initial FOTO score to indicate clinically significant functional improvement   (Met)       Start:  03/11/25    Expected End:  04/22/25    Resolved:  04/15/25         Patient will report 50% overall perceived improvement   (Met)       Start:  03/11/25    Expected End:  04/22/25    Resolved:  04/17/25         Patient will demonstrate independence with HEP (Met)       Start:  03/11/25    Expected End:  04/22/25    Resolved:  04/15/25          Patient will demonstrate full passive ROM of the right shoulder and report 2/10 pain or less (Met)       Start:  03/11/25    Expected End:  04/22/25    Resolved:  04/17/25             Catrachito Quezada PT

## 2025-05-13 ENCOUNTER — CLINICAL SUPPORT (OUTPATIENT)
Dept: REHABILITATION | Facility: HOSPITAL | Age: 65
End: 2025-05-13
Payer: MEDICARE

## 2025-05-13 DIAGNOSIS — S46.001A INJURY OF RIGHT ROTATOR CUFF, INITIAL ENCOUNTER: Primary | ICD-10-CM

## 2025-05-13 DIAGNOSIS — M25.511 RIGHT SHOULDER PAIN, UNSPECIFIED CHRONICITY: ICD-10-CM

## 2025-05-13 DIAGNOSIS — M75.101 TEAR OF RIGHT SUPRASPINATUS TENDON: ICD-10-CM

## 2025-05-13 PROCEDURE — 97014 ELECTRIC STIMULATION THERAPY: CPT

## 2025-05-13 PROCEDURE — 97010 HOT OR COLD PACKS THERAPY: CPT

## 2025-05-13 PROCEDURE — 97110 THERAPEUTIC EXERCISES: CPT

## 2025-05-13 PROCEDURE — 97140 MANUAL THERAPY 1/> REGIONS: CPT

## 2025-05-13 PROCEDURE — 97530 THERAPEUTIC ACTIVITIES: CPT

## 2025-05-13 NOTE — PROGRESS NOTES
Outpatient Rehab    Physical Therapy Visit    Patient Name: Kimberly Agarwal  MRN: [id number]  YOB: 1960  Encounter Date: 5/13/2025    Therapy Diagnosis:   Encounter Diagnoses   Name Primary?    Injury of right rotator cuff, initial encounter Yes    Right shoulder pain, unspecified chronicity     Tear of right supraspinatus tendon      Physician: Pranay Tsai MD    Physician Orders: Eval and Treat  Medical Diagnosis: Unspecified injury of muscle(s) and tendon(s) of the rotator cuff of right shoulder, initial encounter    Visit # / Visits Authorized:  16 / 24  Insurance Authorization Period: 3/11/2025 to 6/3/2025  Date of Evaluation: 3/11/2025  Plan of Care Certification: 3/12/2025 to 6/3/2025        Time In: 0759   Time Out: 0927  Total Time (in minutes): 88   Total Billable Time (in minutes): 88    FOTO:  Therapist reviewed FOTO scores for Kimberly Agarwal on 3/11/2025.   FOTO report - see Media section or FOTO account episode details.      Intake: Patient's Physical FS Primary Measure:  29 (goal is  53@ visit # 15)  Intake:Risk Adjusted Statistical FOTO: 43  Intake:Limitation Score: 71%  Intake:Category: Carrying  Intake: DASH:  80.8% (higher score = greater disability)     4/03/2025: Patient's Physical FS Primary Measure:  35 (goal is  53@ visit # 15)  Intake: Risk Adjusted Statistical FOTO: 43  4/03/2025: Limitation Score: 65%  4/03/2025: Category: Carrying  4/03/2025: DASH:  73.3% (higher score = greater disability)     Intake Score: 29 (53 predicted at visit 15)%  Survey Score 2: 35      Precautions:       Subjective   Kimberly reports that she is still geting some relief from her injection. The shoulder is sore but she is managing..  Pain reported as 2/10.        Treatment:  Therapeutic Exercise  TE 1: 38 mins; pendulum's, pulley's, scap elevations, scap retractions, shoudler isometrics, active ER, active IR, gentle reaches  Manual Therapy  MT 1: 10 mins; STM to right upper trap,  passive cervical stretch, Biofreeze  Therapeutic Activity  TA 1: 15 mins; NuStep push/pull  Modalities  Moist Heat (min): 10 mins; moist heat to the right shoulder pre ex  Electrical Stimulation (Parameters): 15 mins; IFC e-stim with cold pack to the the right shoulder    Time Entry(in minutes): 88  E-Stim (Unattended) Time Entry: 15 (IFC e-stim with cold pack post ex)  Hot/Cold Pack Time Entry: 10 (moist heat to the shoulder pre ex)  Manual Therapy Time Entry: 10  Therapeutic Activity Time Entry: 15  Therapeutic Exercise Time Entry: 38    Assessment & Plan   Assessment: Continued with the plan of care. Kimberly continues to demonstrate more functional use of her arm despite it staying sore. She does have to watch her movements. Gains are being made as there is improved pain free ROM both passive and actively. She is getting full front and side elevation on the pulley's. She is still giving consideration to surgery. She is unsure if the rotator cuff can be repaired or would need replacement.  Evaluation/Treatment Tolerance: Patient tolerated treatment well    Patient will continue to benefit from skilled outpatient physical therapy to address the deficits listed in the problem list box on initial evaluation, provide pt/family education and to maximize pt's level of independence in the home and community environment.     Patient's spiritual, cultural, and educational needs considered and patient agreeable to plan of care and goals.           Plan: Continue with the plan of care to reduce pain and inflammation, restore ROM and improve shoulder stability. Will progress as tolerated and maximize function.    Goals:   Active       LTG       Patient will report at least 20% disability reduction from initial Quick Dash score to indicate clinically significant functional improvement   (Progressing)       Start:  03/11/25    Expected End:  06/03/25            Patient will report at least 20% increase on initial FOTO score to  indicate clinically significant functional improvement   (Progressing)       Start:  03/11/25    Expected End:  06/03/25            Patient will report 70% overall perceived improvement   (Progressing)       Start:  03/11/25    Expected End:  06/03/25            Patient will demonstrate independence with final HEP (Progressing)       Start:  03/11/25    Expected End:  06/03/25            Patient will demonstrate 80% of full active ROM of the right shoulder and report 2/10 pain or less (Progressing)       Start:  03/11/25    Expected End:  06/03/25              Resolved       STG       Patient will report at least 10% disability reduction from initial Quick Dash score to indicate clinically significant functional improvement   (Met)       Start:  03/11/25    Expected End:  04/22/25    Resolved:  04/15/25         Patient will report at least 10% increase on initial FOTO score to indicate clinically significant functional improvement   (Met)       Start:  03/11/25    Expected End:  04/22/25    Resolved:  04/15/25         Patient will report 50% overall perceived improvement   (Met)       Start:  03/11/25    Expected End:  04/22/25    Resolved:  04/17/25         Patient will demonstrate independence with HEP (Met)       Start:  03/11/25    Expected End:  04/22/25    Resolved:  04/15/25         Patient will demonstrate full passive ROM of the right shoulder and report 2/10 pain or less (Met)       Start:  03/11/25    Expected End:  04/22/25    Resolved:  04/17/25             Catrachito Quezada PT

## 2025-05-14 DIAGNOSIS — E11.628 TYPE 2 DIABETES MELLITUS WITH OTHER SKIN COMPLICATION, WITHOUT LONG-TERM CURRENT USE OF INSULIN: ICD-10-CM

## 2025-05-14 RX ORDER — SEMAGLUTIDE 0.68 MG/ML
INJECTION, SOLUTION SUBCUTANEOUS
Qty: 9 EACH | Refills: 3 | Status: SHIPPED | OUTPATIENT
Start: 2025-05-14

## 2025-05-15 ENCOUNTER — PATIENT OUTREACH (OUTPATIENT)
Facility: CLINIC | Age: 65
End: 2025-05-15
Payer: MEDICARE

## 2025-05-16 ENCOUNTER — CLINICAL SUPPORT (OUTPATIENT)
Dept: REHABILITATION | Facility: HOSPITAL | Age: 65
End: 2025-05-16
Payer: MEDICARE

## 2025-05-16 DIAGNOSIS — S46.001A INJURY OF RIGHT ROTATOR CUFF, INITIAL ENCOUNTER: Primary | ICD-10-CM

## 2025-05-16 DIAGNOSIS — M75.101 TEAR OF RIGHT SUPRASPINATUS TENDON: ICD-10-CM

## 2025-05-16 DIAGNOSIS — M25.511 RIGHT SHOULDER PAIN, UNSPECIFIED CHRONICITY: ICD-10-CM

## 2025-05-16 PROCEDURE — 97110 THERAPEUTIC EXERCISES: CPT

## 2025-05-16 PROCEDURE — 97140 MANUAL THERAPY 1/> REGIONS: CPT

## 2025-05-16 PROCEDURE — 97014 ELECTRIC STIMULATION THERAPY: CPT

## 2025-05-16 PROCEDURE — 97530 THERAPEUTIC ACTIVITIES: CPT

## 2025-05-16 NOTE — PROGRESS NOTES
Outpatient Rehab    Physical Therapy Visit    Patient Name: Kimberly Agarwal  MRN: [id number]  YOB: 1960  Encounter Date: 5/16/2025    Therapy Diagnosis:   Encounter Diagnoses   Name Primary?    Injury of right rotator cuff, initial encounter Yes    Right shoulder pain, unspecified chronicity     Tear of right supraspinatus tendon      Physician: Pranay Tsai MD    Physician Orders: Eval and Treat  Medical Diagnosis: Unspecified injury of muscle(s) and tendon(s) of the rotator cuff of right shoulder, initial encounter    Visit # / Visits Authorized:  17 / 24  Insurance Authorization Period: 3/11/2025 to 6/3/2025  Date of Evaluation: 3/11/2025  Plan of Care Certification: 3/12/2025 to 6/3/2025        Time In: 0812   Time Out: 0924  Total Time (in minutes): 72   Total Billable Time (in minutes): 72    FOTO:  Therapist reviewed FOTO scores for Kimberly Agarwal on 3/11/2025.   FOTO report - see Media section or FOTO account episode details.      Intake: Patient's Physical FS Primary Measure:  29 (goal is  53@ visit # 15)  Intake:Risk Adjusted Statistical FOTO: 43  Intake:Limitation Score: 71%  Intake:Category: Carrying  Intake: DASH:  80.8% (higher score = greater disability)     4/03/2025: Patient's Physical FS Primary Measure:  35 (goal is  53@ visit # 15)  Intake: Risk Adjusted Statistical FOTO: 43  4/03/2025: Limitation Score: 65%  4/03/2025: Category: Carrying  4/03/2025: DASH:  73.3% (higher score = greater disability)     Intake Score: 29 (53 predicted at visit 15)%  Survey Score 2: 35      Precautions:       Subjective   Kimberly reports some increased soreness in the shoulder but states that she went fishing and did some cooking having to stir in a pot.  Pain reported as 5/10.        Treatment:  Therapeutic Exercise  TE 1: 32 mins; pendulum's, pulley's, scap elevations, scap retractions, shoudler isometrics, active ER, active IR, gentle reaches  Manual Therapy  MT 1: 10 mins; STM to  right upper trap, passive cervical stretch, Biofreeze  Therapeutic Activity  TA 1: 15 mins; NuStep push/pull  Modalities  Electrical Stimulation (Parameters): 15 mins; IFC e-stim with cold pack to the the right shoulder    Time Entry(in minutes): 72  E-Stim (Unattended) Time Entry: 15  Manual Therapy Time Entry: 10  Therapeutic Activity Time Entry: 15  Therapeutic Exercise Time Entry: 32    Assessment & Plan   Assessment: Continued with the plan of care. Kimberly had a little more pain today with some of the exercises and required more time with each. Overall she continues to demonstrate more functional use of her arm despite it staying sore. She does have to watch her movements. Gains are being made as there is improved pain free ROM both passive and actively. She is getting full front and side elevation on the pulley's.  Evaluation/Treatment Tolerance: Patient tolerated treatment well    Patient will continue to benefit from skilled outpatient physical therapy to address the deficits listed in the problem list box on initial evaluation, provide pt/family education and to maximize pt's level of independence in the home and community environment.     Patient's spiritual, cultural, and educational needs considered and patient agreeable to plan of care and goals.           Plan: Continue with the plan of care to reduce pain and inflammation, restore ROM and improve shoulder stability. Will progress as tolerated and maximize function.    Goals:   Active       LTG       Patient will report at least 20% disability reduction from initial Quick Dash score to indicate clinically significant functional improvement   (Progressing)       Start:  03/11/25    Expected End:  06/03/25            Patient will report at least 20% increase on initial FOTO score to indicate clinically significant functional improvement   (Progressing)       Start:  03/11/25    Expected End:  06/03/25            Patient will report 70% overall  perceived improvement   (Progressing)       Start:  03/11/25    Expected End:  06/03/25            Patient will demonstrate independence with final HEP (Progressing)       Start:  03/11/25    Expected End:  06/03/25            Patient will demonstrate 80% of full active ROM of the right shoulder and report 2/10 pain or less (Progressing)       Start:  03/11/25    Expected End:  06/03/25              Resolved       STG       Patient will report at least 10% disability reduction from initial Quick Dash score to indicate clinically significant functional improvement   (Met)       Start:  03/11/25    Expected End:  04/22/25    Resolved:  04/15/25         Patient will report at least 10% increase on initial FOTO score to indicate clinically significant functional improvement   (Met)       Start:  03/11/25    Expected End:  04/22/25    Resolved:  04/15/25         Patient will report 50% overall perceived improvement   (Met)       Start:  03/11/25    Expected End:  04/22/25    Resolved:  04/17/25         Patient will demonstrate independence with HEP (Met)       Start:  03/11/25    Expected End:  04/22/25    Resolved:  04/15/25         Patient will demonstrate full passive ROM of the right shoulder and report 2/10 pain or less (Met)       Start:  03/11/25    Expected End:  04/22/25    Resolved:  04/17/25             Catrachito Quezada PT

## 2025-05-20 ENCOUNTER — CLINICAL SUPPORT (OUTPATIENT)
Dept: REHABILITATION | Facility: HOSPITAL | Age: 65
End: 2025-05-20
Payer: MEDICARE

## 2025-05-20 DIAGNOSIS — M25.511 RIGHT SHOULDER PAIN, UNSPECIFIED CHRONICITY: ICD-10-CM

## 2025-05-20 DIAGNOSIS — S46.001A INJURY OF RIGHT ROTATOR CUFF, INITIAL ENCOUNTER: Primary | ICD-10-CM

## 2025-05-20 DIAGNOSIS — M75.101 TEAR OF RIGHT SUPRASPINATUS TENDON: ICD-10-CM

## 2025-05-20 PROCEDURE — 97140 MANUAL THERAPY 1/> REGIONS: CPT

## 2025-05-20 PROCEDURE — 97010 HOT OR COLD PACKS THERAPY: CPT

## 2025-05-20 PROCEDURE — 97110 THERAPEUTIC EXERCISES: CPT

## 2025-05-20 PROCEDURE — 97530 THERAPEUTIC ACTIVITIES: CPT

## 2025-05-20 PROCEDURE — 97014 ELECTRIC STIMULATION THERAPY: CPT

## 2025-05-20 NOTE — PROGRESS NOTES
Outpatient Rehab    Physical Therapy Visit    Patient Name: Kimberly Agarwal  MRN: [id number]  YOB: 1960  Encounter Date: 5/20/2025    Therapy Diagnosis:   Encounter Diagnoses   Name Primary?    Injury of right rotator cuff, initial encounter Yes    Right shoulder pain, unspecified chronicity     Tear of right supraspinatus tendon      Physician: Pranay Tsai MD    Physician Orders: Eval and Treat  Medical Diagnosis: Unspecified injury of muscle(s) and tendon(s) of the rotator cuff of right shoulder, initial encounter    Visit # / Visits Authorized:  18 / 24  Insurance Authorization Period: 3/11/2025 to 6/3/2025  Date of Evaluation: 3/11/2025  Plan of Care Certification: 3/12/2025 to 6/3/2025        Time In: 0809   Time Out: 0921  Total Time (in minutes): 72   Total Billable Time (in minutes): 72    FOTO:  Therapist reviewed FOTO scores for Kimberly Agarwal on 3/11/2025.   FOTO report - see Media section or FOTO account episode details.      Intake: Patient's Physical FS Primary Measure:  29 (goal is  53@ visit # 15)  Intake:Risk Adjusted Statistical FOTO: 43  Intake:Limitation Score: 71%  Intake:Category: Carrying  Intake: DASH:  80.8% (higher score = greater disability)     4/03/2025: Patient's Physical FS Primary Measure:  35 (goal is  53@ visit # 15)  Intake: Risk Adjusted Statistical FOTO: 43  4/03/2025: Limitation Score: 65%  4/03/2025: Category: Carrying  4/03/2025: DASH:  73.3% (higher score = greater disability)    4/24/2025: Patient's Physical FS Primary Measure:  54 (goal is  55@ visit # 15)  Intake: Risk Adjusted Statistical FOTO: 43  4/24/2025:  Limitation Score: 46%  4/24/2025: Category: Carrying  4/24/2025: DASH:  33.3% (higher score = greater disability)     Intake Score: 29 (53 predicted at visit 15)%  Survey Score 2: 35  Survey Score 3: 54    Precautions: none       Subjective   Kimberly went fishing [place] the wekend and reports that she had no issues with the shoulder. She  is learning how to avoid the pain. She feels the is weak.  Pain reported as 2/10.        Treatment:  Therapeutic Exercise  TE 1: 32 mins; pendulum's, pulley's, scap elevations, scap retractions, shoudler isometrics, active ER, active IR, gentle reaches  Manual Therapy  MT 1: 10 mins; STM to right upper trap, passive cervical stretch, Biofreeze  Therapeutic Activity  TA 1: 15 mins; NuStep push/pull  Modalities  Moist Heat (min): 10 mins; moist heat to the right shoulder pre ex  Electrical Stimulation (Parameters): 15 mins; IFC e-stim with cold pack to the the right shoulder    Time Entry(in minutes): 72  E-Stim (Unattended) Time Entry: 15 (IFC e-stim with cold pack post ex)  Hot/Cold Pack Time Entry: 10 (moist heat to the shoulder pre ex)  Manual Therapy Time Entry: 10  Therapeutic Activity Time Entry: 15  Therapeutic Exercise Time Entry: 32    Assessment & Plan   Assessment: Continued with the plan of care. Kimberly did well today and in fact probably her best day.  She did not have the pain or soreness with the routine. Overall she continues to demonstrate more functional use of her arm despite it staying sore. Her score on the functional outcome tool is much improved. She does have to watch her movements but she has learned what movements to avoid. Gains are being made as there is improved pain free ROM both passive and actively. She is getting full front and side elevation on the pulley's.  Evaluation/Treatment Tolerance: Patient tolerated treatment well    Patient will continue to benefit from skilled outpatient physical therapy to address the deficits listed in the problem list box on initial evaluation, provide pt/family education and to maximize pt's level of independence in the home and community environment.     Patient's spiritual, cultural, and educational needs considered and patient agreeable to plan of care and goals.           Plan: Continue with the plan of care to reduce pain and inflammation,  restore ROM and improve shoulder stability. Will progress as tolerated and maximize function.    Goals:   Active       LTG       Patient will report at least 20% disability reduction from initial Quick Dash score to indicate clinically significant functional improvement   (Progressing)       Start:  03/11/25    Expected End:  06/03/25            Patient will report at least 20% increase on initial FOTO score to indicate clinically significant functional improvement   (Progressing)       Start:  03/11/25    Expected End:  06/03/25            Patient will report 70% overall perceived improvement   (Progressing)       Start:  03/11/25    Expected End:  06/03/25            Patient will demonstrate independence with final HEP (Progressing)       Start:  03/11/25    Expected End:  06/03/25            Patient will demonstrate 80% of full active ROM of the right shoulder and report 2/10 pain or less (Progressing)       Start:  03/11/25    Expected End:  06/03/25              Resolved       STG       Patient will report at least 10% disability reduction from initial Quick Dash score to indicate clinically significant functional improvement   (Met)       Start:  03/11/25    Expected End:  04/22/25    Resolved:  04/15/25         Patient will report at least 10% increase on initial FOTO score to indicate clinically significant functional improvement   (Met)       Start:  03/11/25    Expected End:  04/22/25    Resolved:  04/15/25         Patient will report 50% overall perceived improvement   (Met)       Start:  03/11/25    Expected End:  04/22/25    Resolved:  04/17/25         Patient will demonstrate independence with HEP (Met)       Start:  03/11/25    Expected End:  04/22/25    Resolved:  04/15/25         Patient will demonstrate full passive ROM of the right shoulder and report 2/10 pain or less (Met)       Start:  03/11/25    Expected End:  04/22/25    Resolved:  04/17/25             Catrachito Quezada PT

## 2025-05-21 ENCOUNTER — OFFICE VISIT (OUTPATIENT)
Dept: FAMILY MEDICINE | Facility: CLINIC | Age: 65
End: 2025-05-21
Payer: MEDICARE

## 2025-05-21 VITALS
OXYGEN SATURATION: 95 % | DIASTOLIC BLOOD PRESSURE: 89 MMHG | HEIGHT: 62 IN | RESPIRATION RATE: 20 BRPM | HEART RATE: 94 BPM | TEMPERATURE: 98 F | SYSTOLIC BLOOD PRESSURE: 136 MMHG | WEIGHT: 282 LBS | BODY MASS INDEX: 51.89 KG/M2

## 2025-05-21 DIAGNOSIS — B00.1 HERPES LABIALIS: ICD-10-CM

## 2025-05-21 DIAGNOSIS — K21.9 GASTROESOPHAGEAL REFLUX DISEASE, UNSPECIFIED WHETHER ESOPHAGITIS PRESENT: ICD-10-CM

## 2025-05-21 DIAGNOSIS — E11.628 TYPE 2 DIABETES MELLITUS WITH OTHER SKIN COMPLICATION, WITHOUT LONG-TERM CURRENT USE OF INSULIN: Primary | ICD-10-CM

## 2025-05-21 DIAGNOSIS — N32.81 OVERACTIVE BLADDER: ICD-10-CM

## 2025-05-21 DIAGNOSIS — I10 ESSENTIAL HYPERTENSION: ICD-10-CM

## 2025-05-21 PROCEDURE — 3075F SYST BP GE 130 - 139MM HG: CPT | Mod: CPTII,,,

## 2025-05-21 PROCEDURE — 1160F RVW MEDS BY RX/DR IN RCRD: CPT | Mod: CPTII,,,

## 2025-05-21 PROCEDURE — 3008F BODY MASS INDEX DOCD: CPT | Mod: CPTII,,,

## 2025-05-21 PROCEDURE — 3079F DIAST BP 80-89 MM HG: CPT | Mod: CPTII,,,

## 2025-05-21 PROCEDURE — 99214 OFFICE O/P EST MOD 30 MIN: CPT | Mod: ,,,

## 2025-05-21 PROCEDURE — 3066F NEPHROPATHY DOC TX: CPT | Mod: CPTII,,,

## 2025-05-21 PROCEDURE — 1101F PT FALLS ASSESS-DOCD LE1/YR: CPT | Mod: CPTII,,,

## 2025-05-21 PROCEDURE — 1125F AMNT PAIN NOTED PAIN PRSNT: CPT | Mod: CPTII,,,

## 2025-05-21 PROCEDURE — 4010F ACE/ARB THERAPY RXD/TAKEN: CPT | Mod: CPTII,,,

## 2025-05-21 PROCEDURE — 3060F POS MICROALBUMINURIA REV: CPT | Mod: CPTII,,,

## 2025-05-21 PROCEDURE — 3044F HG A1C LEVEL LT 7.0%: CPT | Mod: CPTII,,,

## 2025-05-21 PROCEDURE — 3288F FALL RISK ASSESSMENT DOCD: CPT | Mod: CPTII,,,

## 2025-05-21 PROCEDURE — 1159F MED LIST DOCD IN RCRD: CPT | Mod: CPTII,,,

## 2025-05-21 RX ORDER — ESOMEPRAZOLE MAGNESIUM 40 MG/1
40 CAPSULE, DELAYED RELEASE ORAL
Qty: 90 CAPSULE | Refills: 1 | Status: SHIPPED | OUTPATIENT
Start: 2025-05-21 | End: 2025-11-17

## 2025-05-21 RX ORDER — MIRABEGRON 50 MG/1
1 TABLET, FILM COATED, EXTENDED RELEASE ORAL DAILY
Qty: 90 TABLET | Refills: 1 | Status: SHIPPED | OUTPATIENT
Start: 2025-05-21 | End: 2025-11-17

## 2025-05-21 RX ORDER — TRIAMCINOLONE ACETONIDE 1 MG/G
CREAM TOPICAL
COMMUNITY

## 2025-05-21 RX ORDER — SEMAGLUTIDE 0.68 MG/ML
0.5 INJECTION, SOLUTION SUBCUTANEOUS
Qty: 9 ML | Refills: 1 | Status: SHIPPED | OUTPATIENT
Start: 2025-05-21 | End: 2025-11-17

## 2025-05-21 RX ORDER — VALACYCLOVIR HYDROCHLORIDE 500 MG/1
500 TABLET, FILM COATED ORAL DAILY PRN
Qty: 90 TABLET | Refills: 0 | Status: SHIPPED | OUTPATIENT
Start: 2025-05-21 | End: 2025-08-19

## 2025-05-21 RX ORDER — KETOCONAZOLE 20 MG/G
CREAM TOPICAL
COMMUNITY

## 2025-05-21 NOTE — ASSESSMENT & PLAN NOTE
Advised the patient to focus on diet and exercise for ongoing blood sugar and blood pressure control.    Emphasized the importance of reducing salt intake to manage BP and sodium levels, recommending complete elimination of salt from diet and use of salt-free seasonings like Mrs. Valderrama as alternatives.    Discussed the relationship between dietary choices and lab results, particularly cholesterol and triglycerides, encouraging conscious decisions about food choices.

## 2025-05-21 NOTE — ASSESSMENT & PLAN NOTE
Monitored the patient's A1C, which has improved to 5.5, indicating better glycemic control.  Continued current medication regimen of Metformin 500 mg twice daily and Ozempic 0.5 mg weekly.    Noted that Ozempic is a tool to aid weight loss but requires conscious dietary decisions.  Ordered CMP and A1C to be completed in 6 months.    Educated the patient on the significance of microalbumin levels in urine as an indicator of kidney dysfunction.  Advised the patient to focus on diet and exercise for ongoing blood sugar and blood pressure control.

## 2025-05-21 NOTE — PROGRESS NOTES
"  Patient ID: [id number]     Chief Complaint: Diabetes (3 month f/u)      HPI:     Kimberly presents today for diabetes follow-up.    DIABETES AND WEIGHT MANAGEMENT:  She reports weight gain from 273 to 282 lbs since her last visit on May 6th. She acknowledges not following the prescribed diet and reports high salt intake due to her 's cooking habits. Despite being on Ozempic, she continues to experience hunger.    MUSCULOSKELETAL:  She reports significant knee pain described as "bone on bone" with difficulty bending and rising. She also has rotator cuff issues affecting cooking activities. During physical therapy for her arm, she notes limited range of motion, particularly with overhead reaching.    HERPES LABIALIS:  She reports increased occurrence of fever blisters in the past month, attributing them to stress, sun exposure, and heat. She has two Valtrex tablets remaining.    Past Medical History:   Diagnosis Date    Achilles tendinitis, left leg     Astigmatism     Bursitis of right shoulder     Chronic back pain     Chronic pain     GERD (gastroesophageal reflux disease)     Hepatitis a without hepatic coma     Herpes labialis     High cholesterol     HLD (hyperlipidemia)     HTN (hypertension)     Hyperopia     Hypokalemia     Hypokalemia 10/26/2018    Lower extremity edema     OAB (overactive bladder)     Personal history of colonic polyps 04/27/2022    s/p polypectomy - Dr Matheus Alba    Presbyopia     RLS (restless legs syndrome)     Sciatica     Sleep apnea     CPAP    Sleep disorder     Spondylosis of lumbosacral spine with radiculopathy     Type 2 diabetes mellitus with unspecified diabetic retinopathy without macular edema     Urinary incontinence, mixed     Vitamin D deficiency     Wound of right leg 10/27/2022        Past Surgical History:   Procedure Laterality Date    APPENDECTOMY      BLOCK, NERVE, GENICULAR Bilateral 05/05/2023    Dr Phillip Clement    COLONOSCOPY W/ BIOPSIES AND POLYPECTOMY  " 2022    Dr Matheus Alba    COLONOSCOPY W/ BIOPSIES AND POLYPECTOMY  2019    Dr Matheus Alba    EPIDURAL STEROID INJECTION INTO LUMBAR SPINE      Dr Lorelei Schmidt    ESOPHAGOGASTRODUODENOSCOPY  2022    Dr Matheus Alba    ESOPHAGOGASTRODUODENOSCOPY  2019    Dr Matheus Alba    JOINT REPLACEMENT  2 years ago    Complete left knee replacement    LIVER BIOPSY      LUMBAR FUSION  2010    Fused L4-5    OPEN REDUCTION AND INTERNAL FIXATION (ORIF) OF INJURY OF WRIST Right     RADIOFREQUENCY ABLATION OF LUMBAR MEDIAL BRANCH NERVE AT SINGLE LEVEL Bilateral 2022    Dr Phillip Clement MD    RADIOFREQUENCY ABLATION OF LUMBAR MEDIAL BRANCH NERVE AT SINGLE LEVEL Bilateral 2023    Procedure: RADIOFREQUENCY ABLATION, NERVE, SPINAL, LUMBAR, MEDIAL BRANCH, 1 LEVEL (Bilateral L3-5 RFA) *Latex Allergy*;  Surgeon: Phillip Clement MD;  Location: [facility];  Service: Pain Management;  Laterality: Bilateral;    SPINE SURGERY      TONSILLECTOMY      TOTAL KNEE ARTHROPLASTY Left         Social History     Socioeconomic History    Marital status:    Tobacco Use    Smoking status: Former     Current packs/day: 0.00     Average packs/day: 0.3 packs/day for 5.2 years (1.3 ttl pk-yrs)     Types: Cigarettes     Quit date: 2024     Years since quittin.3    Smokeless tobacco: Never   Substance and Sexual Activity    Alcohol use: Not Currently    Drug use: Never    Sexual activity: Not Currently     Partners: Male     Social Drivers of Health     Financial Resource Strain: Low Risk  (2023)    Overall Financial Resource Strain (CARDIA)     Difficulty of Paying Living Expenses: Not hard at all   Food Insecurity: No Food Insecurity (2023)    Hunger Vital Sign     Worried About Running Out of Food in the Last Year: Never true     Ran Out of Food in the Last Year: Never true   Transportation Needs: No Transportation Needs (2023)    PRAPARE - Transportation     Lack of Transportation (Medical): No      Lack of Transportation (Non-Medical): No   Physical Activity: Inactive (5/19/2023)    Exercise Vital Sign     Days of Exercise per Week: 0 days     Minutes of Exercise per Session: 0 min   Stress: Stress Concern Present (5/19/2023)    [employer] [place] [employer] - Occupational Stress Questionnaire     Feeling of Stress : To some extent   Housing Stability: Low Risk  (5/19/2023)    Housing Stability Vital Sign     Unable to Pay for Housing in the Last Year: No     Number of Places Lived in the Last Year: 1     Unstable Housing in the Last Year: No        Current Outpatient Medications   Medication Instructions    betamethasone valerate 0.1% (VALISONE) 0.1 % Crea APPLY 1 APPLICATION TOPICALLY 2 (TWO) TIMES A DAY.    calcium carbonate/vitamin D3 (CALTRATE 600 PLUS D ORAL) 1 tablet, 2 times daily    cetirizine (ZYRTEC) 10 mg, Nightly    cyclobenzaprine (FLEXERIL) 10 mg, for muscle spasm    DULoxetine (CYMBALTA) 60 mg, Oral, Every morning    esomeprazole (NEXIUM) 40 mg, Oral, Before breakfast    furosemide (LASIX) 40 mg, Oral, 2 times daily    HYDROcodone-acetaminophen (NORCO) 7.5-325 mg per tablet 1 tablet, 2 times daily PRN    ketoconazole (NIZORAL) 2 % cream APPLY CREAM TOPICALLY TO AFFECTED RASH AREA 3 TIMES DAILY    losartan (COZAAR) 25 mg, Oral, Every morning    losartan-hydrochlorothiazide 50-12.5 mg (HYZAAR) 50-12.5 mg per tablet 1 tablet, Oral, Every morning    lutein 40 mg Cap 1 capsule, Every morning    metFORMIN (GLUCOPHAGE) 500 mg, Oral, 2 times daily with meals    mirabegron (MYRBETRIQ) 50 mg, Oral, Daily    oxybutynin (DITROPAN-XL) 5 mg, Oral, Daily    OZEMPIC 0.5 mg, Subcutaneous, Every 7 days    polyethylene glycol (GLYCOLAX) 17 g, Nightly    potassium chloride SA (K-DUR,KLOR-CON) 20 MEQ tablet 20 mEq, Oral, 2 times daily    pramipexole (MIRAPEX) 0.125 mg, Oral, Nightly    pregabalin (LYRICA) 150 mg, 2 times daily    simvastatin (ZOCOR) 20 mg, Oral, Nightly    triamcinolone acetonide  "0.1% (KENALOG) 0.1 % cream APPLY CREAM EXTERNALLY TO AFFECTED AREA TWICE DAILY    TRUE METRIX GLUCOSE TEST STRIP Strp CHECK BLOOD SUGAR ONE TIME DAILY    TRUEPLUS LANCETS 30 gauge Misc CHECK BLOOD SUGAR ONE TIME DAILY    valACYclovir (VALTREX) 500 mg, Oral, Daily PRN       Review of patient's allergies indicates:   Allergen Reactions    Latex Rash    Adhesive tape-silicones Blisters    Ciprofloxacin         Patient Care Team:  Kai Back DO as PCP - General (Family Medicine)  Phillip Clement MD as Consulting Physician (Pain Medicine)  Matheus Alba MD as Consulting Physician (Gastroenterology)  Lorelei Schmidt MD as Anesthesiologist (Interventional Pain Medicine)  Yeison Leal MD (Orthopedic Surgery)  [place] ([facility] Services)  Chapo Steele MD (Obstetrics and Gynecology)  Kirit De OD (Optometry)     Subjective:     Review of Systems    12 point review of systems conducted, negative except as stated in the history of present illness. See HPI for details.    Objective:     Visit Vitals  /89 (BP Location: Right arm, Patient Position: Sitting)   Pulse 94   Temp 98.1 °F (36.7 °C)   Resp 20   Ht 5' 2" (1.575 m)   Wt 127.9 kg (282 lb)   SpO2 95%   BMI 51.58 kg/m²       Physical Exam  Vitals and nursing note reviewed.   Constitutional:       General: She is not in acute distress.     Appearance: She is obese. She is not ill-appearing.   HENT:      Head: Normocephalic and atraumatic.      Mouth/Throat:      Mouth: Mucous membranes are moist.      Pharynx: Oropharynx is clear.   Eyes:      General: No scleral icterus.     Extraocular Movements: Extraocular movements intact.      Conjunctiva/sclera: Conjunctivae normal.      Pupils: Pupils are equal, round, and reactive to light.   Neck:      Vascular: No carotid bruit.   Cardiovascular:      Rate and Rhythm: Normal rate and regular rhythm.      Heart sounds: No murmur heard.     No friction rub. No gallop. "   Pulmonary:      Effort: Pulmonary effort is normal. No respiratory distress.      Breath sounds: Normal breath sounds. No wheezing, rhonchi or rales.   Abdominal:      General: Abdomen is flat. Bowel sounds are normal. There is no distension.      Palpations: Abdomen is soft. There is no mass.      Tenderness: There is no abdominal tenderness.   Musculoskeletal:         General: Normal range of motion.      Cervical back: Normal range of motion and neck supple.   Skin:     General: Skin is warm and dry.   Neurological:      General: No focal deficit present.      Mental Status: She is alert.   Psychiatric:         Mood and Affect: Mood normal.       Labs Reviewed:     Chemistry:  Lab Results   Component Value Date     (H) 05/06/2025    K 4.4 05/06/2025    BUN 16.0 05/06/2025    CREATININE 0.76 05/06/2025    EGFRNORACEVR >60 05/06/2025    CALCIUM 9.9 05/06/2025    ALKPHOS 74 05/06/2025    ALBUMIN 3.9 05/06/2025    BILIDIR 0.3 11/01/2021    IBILI 0.30 11/01/2021    AST 14 05/06/2025    ALT 13 05/06/2025    CTHIRXGI51LD 63 05/06/2025    TSH 0.632 01/18/2024        Lab Results   Component Value Date    HGBA1C 5.5 05/06/2025        Hematology:  Lab Results   Component Value Date    WBC 5.84 01/28/2025    HGB 13.0 01/28/2025    HCT 42.0 01/28/2025     01/28/2025       Lipid Panel:  Lab Results   Component Value Date    CHOL 158 05/06/2025    HDL 42 05/06/2025    LDL 74.00 05/06/2025    TRIG 209 (H) 05/06/2025    TOTALCHOLEST 4 05/06/2025        Urine:  Lab Results   Component Value Date    APPEARANCEUA Clear 05/06/2025    SGUA <=1.005 05/06/2025    PROTEINUA Negative 05/06/2025    KETONESUA Negative 05/06/2025    LEUKOCYTESUR Negative 05/06/2025    CREATRANDUR 28.1 (L) 05/06/2025     Assessment:       ICD-10-CM ICD-9-CM   1. Type 2 diabetes mellitus with other skin complication, without long-term current use of insulin  E11.628 250.80   2. Essential hypertension  I10 401.9   3. Gastroesophageal reflux  disease, unspecified whether esophagitis present  K21.9 530.81   4. Overactive bladder  N32.81 596.51   5. Herpes labialis  B00.1 054.9     Plan:     1. Type 2 diabetes mellitus with other skin complication, without long-term current use of insulin  Overview:  Hemoglobin A1c   Date Value Ref Range Status   05/06/2025 5.5 <=7.0 % Final   01/28/2025 5.7 <=7.0 % Final   07/23/2024 5.7 <=7.0 % Final         Assessment & Plan:  Monitored the patient's A1C, which has improved to 5.5, indicating better glycemic control.  Continued current medication regimen of Metformin 500 mg twice daily and Ozempic 0.5 mg weekly.    Noted that Ozempic is a tool to aid weight loss but requires conscious dietary decisions.  Ordered CMP and A1C to be completed in 6 months.    Educated the patient on the significance of microalbumin levels in urine as an indicator of kidney dysfunction.  Advised the patient to focus on diet and exercise for ongoing blood sugar and blood pressure control.    Orders:  -     semaglutide (OZEMPIC) 0.25 mg or 0.5 mg (2 mg/3 mL) pen injector; Inject 0.5 mg into the skin every 7 days.  Dispense: 9 mL; Refill: 1  -     Hemoglobin A1C; Future; Expected date: 11/21/2025  -     Comprehensive Metabolic Panel; Future; Expected date: 11/21/2025  -     Microalbumin/Creatinine Ratio, Urine; Future; Expected date: 11/21/2025    2. Essential hypertension  Assessment & Plan:  Advised the patient to focus on diet and exercise for ongoing blood sugar and blood pressure control.    Emphasized the importance of reducing salt intake to manage BP and sodium levels, recommending complete elimination of salt from diet and use of salt-free seasonings like Mrs. Valderrama as alternatives.    Discussed the relationship between dietary choices and lab results, particularly cholesterol and triglycerides, encouraging conscious decisions about food choices.      3. Gastroesophageal reflux disease, unspecified whether esophagitis present  -      esomeprazole (NEXIUM) 40 MG capsule; Take 1 capsule (40 mg total) by mouth before breakfast.  Dispense: 90 capsule; Refill: 1    4. Overactive bladder  -     mirabegron (MYRBETRIQ) 50 mg Tb24; Take 1 tablet (50 mg total) by mouth once daily.  Dispense: 90 tablet; Refill: 1    5. Herpes labialis  -     valACYclovir (VALTREX) 500 MG tablet; Take 1 tablet (500 mg total) by mouth daily as needed (cold sores).  Dispense: 90 tablet; Refill: 0      Follow up in about 6 months (around 11/21/2025) for Diabetes Follow Up with labs before . In addition to their scheduled follow up, the patient has also been instructed to follow up on as needed basis.     This note was generated with the assistance of ambient listening technology. Verbal consent was obtained by the patient and accompanying visitor(s) for the recording of patient appointment to facilitate this note. I attest to having reviewed and edited the generated note for accuracy, though some syntax or spelling errors may persist. Please contact the author of this note for any clarification.    Rosana Rojas, Adult-Gerontology NP

## 2025-05-22 ENCOUNTER — CLINICAL SUPPORT (OUTPATIENT)
Dept: REHABILITATION | Facility: HOSPITAL | Age: 65
End: 2025-05-22
Payer: MEDICARE

## 2025-05-22 DIAGNOSIS — M75.101 TEAR OF RIGHT SUPRASPINATUS TENDON: ICD-10-CM

## 2025-05-22 DIAGNOSIS — M25.511 RIGHT SHOULDER PAIN, UNSPECIFIED CHRONICITY: ICD-10-CM

## 2025-05-22 DIAGNOSIS — S46.001A INJURY OF RIGHT ROTATOR CUFF, INITIAL ENCOUNTER: Primary | ICD-10-CM

## 2025-05-22 PROCEDURE — 97014 ELECTRIC STIMULATION THERAPY: CPT

## 2025-05-22 PROCEDURE — 97110 THERAPEUTIC EXERCISES: CPT

## 2025-05-22 PROCEDURE — 97010 HOT OR COLD PACKS THERAPY: CPT

## 2025-05-22 PROCEDURE — 97530 THERAPEUTIC ACTIVITIES: CPT

## 2025-05-22 NOTE — PROGRESS NOTES
Outpatient Rehab    Physical Therapy Visit    Patient Name: Kimberly Agarwal  MRN: [id number]  YOB: 1960  Encounter Date: 5/22/2025    Therapy Diagnosis:   Encounter Diagnoses   Name Primary?    Injury of right rotator cuff, initial encounter Yes    Right shoulder pain, unspecified chronicity     Tear of right supraspinatus tendon      Physician: Pranay Tsai MD    Physician Orders: Eval and Treat  Medical Diagnosis: Unspecified injury of muscle(s) and tendon(s) of the rotator cuff of right shoulder, initial encounter    Visit # / Visits Authorized:  19 / 24  Insurance Authorization Period: 3/11/2025 to 6/3/2025  Date of Evaluation: 3/11/2025  Plan of Care Certification: 3/12/2025 to 6/3/2025        Time In: 0804   Time Out: 0924  Total Time (in minutes): 80   Total Billable Time (in minutes): 80    FOTO:  Therapist reviewed FOTO scores for Kimberly Agarwal on 3/11/2025.   FOTO report - see Media section or FOTO account episode details.      Intake: Patient's Physical FS Primary Measure:  29 (goal is  53@ visit # 15)  Intake:Risk Adjusted Statistical FOTO: 43  Intake:Limitation Score: 71%  Intake:Category: Carrying  Intake: DASH:  80.8% (higher score = greater disability)     4/03/2025: Patient's Physical FS Primary Measure:  35 (goal is  53@ visit # 15)  Intake: Risk Adjusted Statistical FOTO: 43  4/03/2025: Limitation Score: 65%  4/03/2025: Category: Carrying  4/03/2025: DASH:  73.3% (higher score = greater disability)    4/24/2025: Patient's Physical FS Primary Measure:  54 (goal is  55@ visit # 15)  Intake: Risk Adjusted Statistical FOTO: 43  4/24/2025:  Limitation Score: 46%  4/24/2025: Category: Carrying  4/24/2025: DASH:  33.3% (higher score = greater disability)     Intake Score: 29 (53 predicted at visit 15)%  Survey Score 2: 35  Survey Score 3: 54    Precautions: none       Subjective   Kimberly comes to therapy rporting that the shoulder is pretty sore today. Yesterday did a lot of  reaching into the cabinet cleaning out things. The shoulder is activity dependent with the more she does the more it will hurt. She does feel that the injection has been very beneficial. She is sleepiong really well these days..  Pain reported as 5/10.        Treatment:  Therapeutic Exercise  TE 1: 31 mins; pendulum's, pulley's, scap elevations, scap retractions, shoudler isometrics, banded ER, banded  IR, bent over rows, bent over ext, biceps curls, chair pushups  Therapeutic Activity  TA 1: 24 mins; NuStep push/pull, wall dot reaches, wand elevations, wand press  Modalities  Moist Heat (min): 10 mins; moist heat to the right shoulder pre ex  Electrical Stimulation (Parameters): 15 mins; IFC e-stim with cold pack to the the right shoulder    Time Entry(in minutes): 80  E-Stim (Unattended) Time Entry: 15 (IFC e-stim with cold pack post ex)  Hot/Cold Pack Time Entry: 10 (moist heat to shoulder pre ex)  Therapeutic Activity Time Entry: 24  Therapeutic Exercise Time Entry: 31    Assessment & Plan   Assessment: Kimberly not quite as good with the routine today. Overall she continues to demonstrate more functional use of her arm despite it staying sore. The arm is activity dependent as the more she does the more it hurts. Feels good at rest. She does have to watch her movements and she has learned what movements to avoid. Gains are being made as there is improved pain free ROM both passive and actively. She is getting full front and side elevation on the pulley's. There is improved strength. Gets really good relief from the IFC e-stim and cold pack post ex.  Evaluation/Treatment Tolerance: Patient tolerated treatment well    Patient will continue to benefit from skilled outpatient physical therapy to address the deficits listed in the problem list box on initial evaluation, provide pt/family education and to maximize pt's level of independence in the home and community environment.     Patient's spiritual, cultural, and  educational needs considered and patient agreeable to plan of care and goals.           Plan: Continue with the plan of care to reduce pain and inflammation, restore ROM and improve shoulder stability. Will progress as tolerated and maximize function. Preparing for disharge with a home program on 6/03/2025.    Goals:   Active       LTG       Patient will report at least 20% disability reduction from initial Quick Dash score to indicate clinically significant functional improvement   (Progressing)       Start:  03/11/25    Expected End:  06/03/25            Patient will report at least 20% increase on initial FOTO score to indicate clinically significant functional improvement   (Progressing)       Start:  03/11/25    Expected End:  06/03/25            Patient will report 70% overall perceived improvement   (Progressing)       Start:  03/11/25    Expected End:  06/03/25            Patient will demonstrate independence with final HEP (Progressing)       Start:  03/11/25    Expected End:  06/03/25            Patient will demonstrate 80% of full active ROM of the right shoulder and report 2/10 pain or less (Progressing)       Start:  03/11/25    Expected End:  06/03/25              Resolved       STG       Patient will report at least 10% disability reduction from initial Quick Dash score to indicate clinically significant functional improvement   (Met)       Start:  03/11/25    Expected End:  04/22/25    Resolved:  04/15/25         Patient will report at least 10% increase on initial FOTO score to indicate clinically significant functional improvement   (Met)       Start:  03/11/25    Expected End:  04/22/25    Resolved:  04/15/25         Patient will report 50% overall perceived improvement   (Met)       Start:  03/11/25    Expected End:  04/22/25    Resolved:  04/17/25         Patient will demonstrate independence with HEP (Met)       Start:  03/11/25    Expected End:  04/22/25    Resolved:  04/15/25         Patient  will demonstrate full passive ROM of the right shoulder and report 2/10 pain or less (Met)       Start:  03/11/25    Expected End:  04/22/25    Resolved:  04/17/25             Catrachito Quezada PT

## 2025-05-27 ENCOUNTER — CLINICAL SUPPORT (OUTPATIENT)
Dept: REHABILITATION | Facility: HOSPITAL | Age: 65
End: 2025-05-27
Payer: MEDICARE

## 2025-05-27 DIAGNOSIS — M25.511 RIGHT SHOULDER PAIN, UNSPECIFIED CHRONICITY: ICD-10-CM

## 2025-05-27 DIAGNOSIS — S46.001A INJURY OF RIGHT ROTATOR CUFF, INITIAL ENCOUNTER: Primary | ICD-10-CM

## 2025-05-27 DIAGNOSIS — M75.101 TEAR OF RIGHT SUPRASPINATUS TENDON: ICD-10-CM

## 2025-05-27 PROCEDURE — 97530 THERAPEUTIC ACTIVITIES: CPT

## 2025-05-27 PROCEDURE — 97010 HOT OR COLD PACKS THERAPY: CPT

## 2025-05-27 PROCEDURE — 97014 ELECTRIC STIMULATION THERAPY: CPT

## 2025-05-27 PROCEDURE — 97110 THERAPEUTIC EXERCISES: CPT

## 2025-05-27 NOTE — PROGRESS NOTES
Outpatient Rehab    Physical Therapy Visit    Patient Name: Kimberly Agarwal  MRN: [id number]  YOB: 1960  Encounter Date: 5/27/2025    Therapy Diagnosis:   No diagnosis found.    Physician: Pranay Tsai MD    Physician Orders: Eval and Treat  Medical Diagnosis: Unspecified injury of muscle(s) and tendon(s) of the rotator cuff of right shoulder, initial encounter    Visit # / Visits Authorized:  20 / 24  Insurance Authorization Period: 3/11/2025 to 6/3/2025  Date of Evaluation: 3/11/2025  Plan of Care Certification: 3/12/2025 to 6/3/2025        Time In: 0807   Time Out: 0927  Total Time (in minutes): 80   Total Billable Time (in minutes): 80    FOTO:  Therapist reviewed FOTO scores for Kimberly Agarwal on 3/11/2025.   FOTO report - see Media section or FOTO account episode details.      Intake: Patient's Physical FS Primary Measure:  29 (goal is  53@ visit # 15)  Intake:Risk Adjusted Statistical FOTO: 43  Intake:Limitation Score: 71%  Intake:Category: Carrying  Intake: DASH:  80.8% (higher score = greater disability)     4/03/2025: Patient's Physical FS Primary Measure:  35 (goal is  53@ visit # 15)  Intake: Risk Adjusted Statistical FOTO: 43  4/03/2025: Limitation Score: 65%  4/03/2025: Category: Carrying  4/03/2025: DASH:  73.3% (higher score = greater disability)    4/24/2025: Patient's Physical FS Primary Measure:  54 (goal is  55@ visit # 15)  Intake: Risk Adjusted Statistical FOTO: 43  4/24/2025:  Limitation Score: 46%  4/24/2025: Category: Carrying  4/24/2025: DASH:  33.3% (higher score = greater disability)     Intake Score: 29 (53 predicted at visit 15)%  Survey Score 2: 35  Survey Score 3: 54    Precautions: none       Subjective   Patient reporting that the shoulder is hurting today and she attributes it to the rainy weather. Was not able to have her nerve ablation for lower back because she did not get off her [place] meds but no one had told her. Is rescheduled for next  week..  Pain reported as 5/10.        Treatment:  Therapeutic Exercise  TE 1: 31 mins; pendulum's, pulley's, scap elevations, scap retractions, shoudler isometrics, banded ER, banded  IR, bent over rows, bent over ext, biceps curls, chair pushups  Therapeutic Activity  TA 1: 24 mins; NuStep push/pull, wall dot reaches, wand elevations, wand press  Modalities  Moist Heat (min): 10 mins; moist heat to the right shoulder pre ex  Electrical Stimulation (Parameters): 15 mins; IFC e-stim with cold pack to the the right shoulder    Time Entry(in minutes): 80  E-Stim (Unattended) Time Entry: 15 (IFC e-stim with cold pack post ex)  Hot/Cold Pack Time Entry: 10 (moist heat to shoulder pre ex)  Therapeutic Activity Time Entry: 24  Therapeutic Exercise Time Entry: 31    Assessment & Plan   Assessment: Kimberly not quite as good with the routine today. She seemed to have more pain though we worked in mostly in painfree rangesOverall she continues to demonstrate more functional use of her arm despite it staying sore. The arm is activity dependent as the more she does the more it hurts. Feels good at rest. She does have to watch her movements and she has learned what movements to avoid. Gains are being made as there is improved pain free ROM both passive and actively. She is getting full front and side elevation on the pulley's. There is improved strength. Gets really good relief from the IFC e-stim and cold pack post ex.  Evaluation/Treatment Tolerance: Patient tolerated treatment well    Patient will continue to benefit from skilled outpatient physical therapy to address the deficits listed in the problem list box on initial evaluation, provide pt/family education and to maximize pt's level of independence in the home and community environment.     Patient's spiritual, cultural, and educational needs considered and patient agreeable to plan of care and goals.           Plan: Continue with the plan of care to reduce pain and  inflammation, restore ROM and improve shoulder stability. Will progress as tolerated and maximize function. Preparing for disharge with a home program on 6/03/2025.    Goals:   Active       LTG       Patient will report at least 20% disability reduction from initial Quick Dash score to indicate clinically significant functional improvement   (Progressing)       Start:  03/11/25    Expected End:  06/03/25            Patient will report at least 20% increase on initial FOTO score to indicate clinically significant functional improvement   (Progressing)       Start:  03/11/25    Expected End:  06/03/25            Patient will report 70% overall perceived improvement   (Progressing)       Start:  03/11/25    Expected End:  06/03/25            Patient will demonstrate independence with final HEP (Progressing)       Start:  03/11/25    Expected End:  06/03/25            Patient will demonstrate 80% of full active ROM of the right shoulder and report 2/10 pain or less (Progressing)       Start:  03/11/25    Expected End:  06/03/25              Resolved       STG       Patient will report at least 10% disability reduction from initial Quick Dash score to indicate clinically significant functional improvement   (Met)       Start:  03/11/25    Expected End:  04/22/25    Resolved:  04/15/25         Patient will report at least 10% increase on initial FOTO score to indicate clinically significant functional improvement   (Met)       Start:  03/11/25    Expected End:  04/22/25    Resolved:  04/15/25         Patient will report 50% overall perceived improvement   (Met)       Start:  03/11/25    Expected End:  04/22/25    Resolved:  04/17/25         Patient will demonstrate independence with HEP (Met)       Start:  03/11/25    Expected End:  04/22/25    Resolved:  04/15/25         Patient will demonstrate full passive ROM of the right shoulder and report 2/10 pain or less (Met)       Start:  03/11/25    Expected End:  04/22/25     Resolved:  04/17/25             Catrachito Quezada, PT

## 2025-05-29 DIAGNOSIS — G89.4 CHRONIC PAIN SYNDROME: ICD-10-CM

## 2025-05-29 DIAGNOSIS — F32.A DEPRESSIVE DISORDER: ICD-10-CM

## 2025-05-29 RX ORDER — DULOXETIN HYDROCHLORIDE 60 MG/1
60 CAPSULE, DELAYED RELEASE ORAL EVERY MORNING
Qty: 100 CAPSULE | Refills: 3 | Status: SHIPPED | OUTPATIENT
Start: 2025-05-29

## 2025-06-03 ENCOUNTER — CLINICAL SUPPORT (OUTPATIENT)
Dept: REHABILITATION | Facility: HOSPITAL | Age: 65
End: 2025-06-03
Payer: MEDICARE

## 2025-06-03 DIAGNOSIS — M25.511 RIGHT SHOULDER PAIN, UNSPECIFIED CHRONICITY: ICD-10-CM

## 2025-06-03 DIAGNOSIS — M75.101 TEAR OF RIGHT SUPRASPINATUS TENDON: ICD-10-CM

## 2025-06-03 DIAGNOSIS — S46.001A INJURY OF RIGHT ROTATOR CUFF, INITIAL ENCOUNTER: Primary | ICD-10-CM

## 2025-06-03 PROCEDURE — 97010 HOT OR COLD PACKS THERAPY: CPT

## 2025-06-03 PROCEDURE — 97014 ELECTRIC STIMULATION THERAPY: CPT

## 2025-06-03 PROCEDURE — 97110 THERAPEUTIC EXERCISES: CPT

## 2025-06-03 PROCEDURE — 97140 MANUAL THERAPY 1/> REGIONS: CPT

## 2025-06-03 PROCEDURE — 97530 THERAPEUTIC ACTIVITIES: CPT

## 2025-06-19 DIAGNOSIS — E11.628 TYPE 2 DIABETES MELLITUS WITH OTHER SKIN COMPLICATION, WITHOUT LONG-TERM CURRENT USE OF INSULIN: ICD-10-CM

## 2025-06-19 RX ORDER — GLUCOSAM/CHON-MSM1/C/MANG/BOSW 500-416.6
TABLET ORAL
Qty: 100 EACH | Refills: 3 | Status: SHIPPED | OUTPATIENT
Start: 2025-06-19

## 2025-06-19 RX ORDER — CALCIUM CITRATE/VITAMIN D3 200MG-6.25
TABLET ORAL
Qty: 100 STRIP | Refills: 3 | Status: SHIPPED | OUTPATIENT
Start: 2025-06-19

## 2025-08-18 DIAGNOSIS — E11.628 TYPE 2 DIABETES MELLITUS WITH OTHER SKIN COMPLICATION, WITHOUT LONG-TERM CURRENT USE OF INSULIN: ICD-10-CM

## 2025-08-18 RX ORDER — MUPIROCIN 20 MG/G
OINTMENT TOPICAL
Qty: 30 G | Refills: 11 | Status: SHIPPED | OUTPATIENT
Start: 2025-08-18

## 2025-08-28 DIAGNOSIS — K21.9 GASTROESOPHAGEAL REFLUX DISEASE, UNSPECIFIED WHETHER ESOPHAGITIS PRESENT: ICD-10-CM

## 2025-08-28 RX ORDER — ESOMEPRAZOLE MAGNESIUM 40 MG/1
40 CAPSULE, DELAYED RELEASE ORAL
Qty: 100 CAPSULE | Refills: 3 | Status: SHIPPED | OUTPATIENT
Start: 2025-08-28

## 2025-09-02 ENCOUNTER — OFFICE VISIT (OUTPATIENT)
Dept: ORTHOPEDICS | Facility: CLINIC | Age: 65
End: 2025-09-02
Payer: MEDICARE

## 2025-09-02 VITALS
SYSTOLIC BLOOD PRESSURE: 142 MMHG | WEIGHT: 281.94 LBS | BODY MASS INDEX: 51.88 KG/M2 | DIASTOLIC BLOOD PRESSURE: 94 MMHG | HEART RATE: 76 BPM | HEIGHT: 62 IN

## 2025-09-02 DIAGNOSIS — M75.121 COMPLETE TEAR OF RIGHT ROTATOR CUFF, UNSPECIFIED WHETHER TRAUMATIC: Primary | ICD-10-CM

## 2025-09-02 DIAGNOSIS — M25.511 RIGHT SHOULDER PAIN, UNSPECIFIED CHRONICITY: ICD-10-CM

## 2025-09-02 RX ORDER — LIDOCAINE HYDROCHLORIDE 10 MG/ML
2 INJECTION, SOLUTION INFILTRATION; PERINEURAL
Status: DISCONTINUED | OUTPATIENT
Start: 2025-09-02 | End: 2025-09-02 | Stop reason: HOSPADM

## 2025-09-02 RX ORDER — BETAMETHASONE SODIUM PHOSPHATE AND BETAMETHASONE ACETATE 3; 3 MG/ML; MG/ML
6 INJECTION, SUSPENSION INTRA-ARTICULAR; INTRALESIONAL; INTRAMUSCULAR; SOFT TISSUE
Status: DISCONTINUED | OUTPATIENT
Start: 2025-09-02 | End: 2025-09-02 | Stop reason: HOSPADM

## 2025-09-02 RX ADMIN — LIDOCAINE HYDROCHLORIDE 2 ML: 10 INJECTION, SOLUTION INFILTRATION; PERINEURAL at 10:09

## 2025-09-02 RX ADMIN — BETAMETHASONE SODIUM PHOSPHATE AND BETAMETHASONE ACETATE 6 MG: 3; 3 INJECTION, SUSPENSION INTRA-ARTICULAR; INTRALESIONAL; INTRAMUSCULAR; SOFT TISSUE at 10:09

## (undated) DEVICE — GLOVE PROTEXIS NEOPRN SZ6.5

## (undated) DEVICE — CANNULA VENOM RF 18GX100MM

## (undated) DEVICE — TRAY SPINAL 22GA W/DRUG

## (undated) DEVICE — GLOVE PROTEXIS HYDROGEL SZ7.5

## (undated) DEVICE — CANNULA VENOM 18G 150MM

## (undated) DEVICE — APPLICATOR CHLORAPREP ORN 26ML

## (undated) DEVICE — BANDAID HOT COLORS

## (undated) DEVICE — NDL SPINAL SPINOCAN 22GX3.5

## (undated) DEVICE — PAD ELECTROSURGICAL PAT PLATE

## (undated) DEVICE — SYR AMBER ORAL 6ML

## (undated) DEVICE — SYR 3CC LUER LOC

## (undated) DEVICE — SYR ORAL MED 12ML TIP AMBER

## (undated) DEVICE — COVER EQUIPMENT 36X25

## (undated) DEVICE — TOWEL OR BLUE STRL 16X26 4/PK

## (undated) DEVICE — GLOVE PROTEXIS NEOPRENE 7.5

## (undated) DEVICE — GLOVE PROTEXIS NEOPRN SZ8.5